# Patient Record
Sex: MALE | Race: WHITE | NOT HISPANIC OR LATINO | Employment: PART TIME | URBAN - METROPOLITAN AREA
[De-identification: names, ages, dates, MRNs, and addresses within clinical notes are randomized per-mention and may not be internally consistent; named-entity substitution may affect disease eponyms.]

---

## 2017-01-14 ENCOUNTER — HOSPITAL ENCOUNTER (EMERGENCY)
Facility: HOSPITAL | Age: 42
Discharge: HOME | End: 2017-01-14
Attending: FAMILY MEDICINE
Payer: COMMERCIAL

## 2017-01-14 VITALS
DIASTOLIC BLOOD PRESSURE: 97 MMHG | SYSTOLIC BLOOD PRESSURE: 150 MMHG | TEMPERATURE: 99 F | WEIGHT: 218.5 LBS | HEART RATE: 75 BPM | HEIGHT: 70 IN | RESPIRATION RATE: 18 BRPM | BODY MASS INDEX: 31.28 KG/M2 | OXYGEN SATURATION: 98 %

## 2017-01-14 DIAGNOSIS — D64.9 ANEMIA, UNSPECIFIED TYPE: ICD-10-CM

## 2017-01-14 DIAGNOSIS — R60.0 BILATERAL LOWER EXTREMITY EDEMA: Primary | ICD-10-CM

## 2017-01-14 LAB
BUN SERPL-MCNC: 29 MG/DL (ref 6–30)
CHLORIDE SERPL-SCNC: 111 MMOL/L (ref 95–110)
CREAT SERPL-MCNC: 1.8 MG/DL (ref 0.5–1.4)
GLUCOSE SERPL-MCNC: 95 MG/DL (ref 70–110)
HCT VFR BLD CALC: 28 %PCV (ref 36–54)
POC IONIZED CALCIUM: 1.07 MMOL/L (ref 1.06–1.42)
POC TCO2 (MEASURED): 22 MMOL/L (ref 23–29)
POTASSIUM BLD-SCNC: 3.9 MMOL/L (ref 3.5–5.1)
SAMPLE: ABNORMAL
SODIUM BLD-SCNC: 141 MMOL/L (ref 136–145)

## 2017-01-14 PROCEDURE — 25000003 PHARM REV CODE 250: Performed by: PHYSICIAN ASSISTANT

## 2017-01-14 PROCEDURE — 99284 EMERGENCY DEPT VISIT MOD MDM: CPT | Mod: ,,, | Performed by: PHYSICIAN ASSISTANT

## 2017-01-14 PROCEDURE — 99283 EMERGENCY DEPT VISIT LOW MDM: CPT

## 2017-01-14 RX ORDER — HYDROCODONE BITARTRATE AND ACETAMINOPHEN 5; 325 MG/1; MG/1
1 TABLET ORAL
Status: COMPLETED | OUTPATIENT
Start: 2017-01-14 | End: 2017-01-14

## 2017-01-14 RX ADMIN — HYDROCODONE BITARTRATE AND ACETAMINOPHEN 1 TABLET: 5; 325 TABLET ORAL at 01:01

## 2017-01-14 NOTE — ED PROVIDER NOTES
Encounter Date: 1/14/2017       History     Chief Complaint   Patient presents with    Leg Swelling     bilateral leg swelling; increased over the past 2 days; reports CKD     Review of patient's allergies indicates:   Allergen Reactions    Nsaids (non-steroidal anti-inflammatory drug)      Hx of GI bleed     HPI Comments: 40 y/o male with history of GERD, HTN, Cellulitis, Osteomyelitis, CKD, Gout, present to the ER with chief complaint of bilateral lower extremity pain x 2 days.  The patient reports intermittent swelling of the lower legs x 1-1/2 months.  He reports constant swelling for 2 days and associated foot pain while walking.  The pain radiates up the back of his legs.  He denies rash, wound, or trauma to the legs. He denies fever, nausea, vomiting.  He took tramadol for his pain yesterday without improvement.  His pain is rated 9/10. He denies chest pain, shortness of breath, wheezing, cough, abdominal pain or swelling, difficulty urinating, or additional complaints at this time.      Past Medical History   Diagnosis Date    Arthritis 10/11/2013    Blood transfusion     Cellulitis     GERD (gastroesophageal reflux disease)     GI bleed     Gout attack     Hypertension     Neuropathy     Osteomyelitis     Renal disorder        Past Surgical History   Procedure Laterality Date    Cyst removal      Incision and drainage of wound       left hand third finger    Surery on left middle finger       OSTEOMYLITIS       Social History   Substance Use Topics    Smoking status: Never Smoker    Smokeless tobacco: Never Used    Alcohol use No     Review of Systems   Constitutional: Negative for chills and fever.   HENT: Negative for sore throat.    Respiratory: Negative for cough, chest tightness, shortness of breath and wheezing.    Cardiovascular: Negative for chest pain and palpitations.   Gastrointestinal: Negative for abdominal pain, nausea and vomiting.   Genitourinary: Negative for dysuria.    Musculoskeletal: Positive for joint swelling and myalgias. Negative for back pain.   Skin: Negative for color change, rash and wound.   Neurological: Negative for weakness.   Hematological: Does not bruise/bleed easily.   Psychiatric/Behavioral: Negative for confusion.       Physical Exam   Initial Vitals   BP Pulse Resp Temp SpO2   01/14/17 1054 01/14/17 1054 01/14/17 1054 01/14/17 1054 01/14/17 1054   157/77 88 16 98.7 °F (37.1 °C) 100 %     Physical Exam    Constitutional: He appears well-developed and well-nourished.   HENT:   Head: Atraumatic.   Mouth/Throat: Oropharynx is clear and moist.   Eyes: Conjunctivae and EOM are normal. Pupils are equal, round, and reactive to light.   Neck: Normal range of motion. Neck supple.   Cardiovascular: Normal rate and regular rhythm.   Pulmonary/Chest: Breath sounds normal. No respiratory distress. He has no wheezes. He has no rhonchi. He has no rales.   Abdominal: Soft. Bowel sounds are normal. He exhibits no distension. There is no tenderness. There is no rebound and no guarding.   Musculoskeletal:        Right knee: He exhibits normal range of motion and no swelling.        Left knee: He exhibits normal range of motion and no swelling.        Right lower leg: He exhibits edema ( involving distal 1/2 of lower leg.  ).        Left lower leg: He exhibits edema (involving distal 1/2 of lower leg.  ).        Right foot: There is swelling ( diffuse moderate foot edema.  no skin changes, wounds, or drainage. ).        Left foot: There is swelling (diffuse moderate foot edema.  no skin changes, wounds, or drainage.  ).   Neurological: He is alert and oriented to person, place, and time.   Skin: No rash noted.   Psychiatric: He has a normal mood and affect.         ED Course   Procedures  Labs Reviewed   ISTAT CHEM8                   APC / Resident Notes:   Patient presents for evaluation of bilateral lower leg swelling intermittent times 1-1/2 months, with worsening swelling  and pain over the last 2 days.  No history of trauma to suggest fracture.  Patient is neurovascularly intact on exam.    Patient's creatinine is 1.8, which is improved from his baseline.  He has anemia consistent with previous labs with no active bleeding or dizziness.  No significant electrolyte abnormalities on labs performed today.  Patient's bilateral lower extremity leg swelling and pain is most consistent with dependent edema.  He works at a busy [employer] and has been working more than usual over the last month. there is no erythema or skin changes of the lower legs and I do not suspect infectious process or gouty arthritis.   I have instructed that he elevate the extremities multiple times per day.  He is instructed on close follow up with his PCP within 1 week.  He is stable for discharge and will continue tylenol prn pain.  I have given return precautions.  I discussed the care of this patient with my supervising MD.                ED Course     Clinical Impression:   The primary encounter diagnosis was Bilateral lower extremity edema. A diagnosis of Anemia, unspecified type was also pertinent to this visit.          AILYN Vega  01/14/17 9370

## 2017-01-14 NOTE — ED TRIAGE NOTES
Onset two days ago swelling and pain in both feet. Denies SOB or chest pain . Pain in left leg below back of left knee. Lower extremities are tight and  Firm , skin shiny. Denies chest pain . Possibly a 18# weight gain.

## 2017-01-14 NOTE — ED NOTES
LOC: The patient is awake and alert; oriented x 3 and speaking appropriately.  APPEARANCE: Patient resting comfortably, patient is clean and well groomed  SKIN: warm and dry, normal skin turgor & moist mucus membranes, skin intact, no breakdown noted.  MUSCULOSKELETAL: Patient moving all extremities well, no obvious swelling or deformities noted  RESPIRATORY: Airway is open and patent, breath sounds clear throughout all lung fields; respirations are spontaneous, normal effort and rate  CARDIAC: Patient has a normal rate, lower extremity edema noted bilaterally, legs and feet swollen, skin shiny and tight. , capillary refill < 3 seconds; No complaints of chest pain   ABDOMEN: Soft and non tender to palpation, no distention noted. Bowel sounds present x 4

## 2017-01-14 NOTE — ED AVS SNAPSHOT
[facility]  [address]  [facility] [address]               Stu Mitchell   2017 12:31 PM   ED    Description:  Male : 1975   Department:  [facility]           Your Care was Coordinated By:     Provider Role From To    Wagner Carney MD Attending Provider 17 1232 --    AILYN Vega Physician Assistant 17 1231 --      Reason for Visit     Leg Swelling           Diagnoses this Visit        Comments    Bilateral lower extremity edema    -  Primary     Anemia, unspecified type           ED Disposition     None           To Do List           Follow-up Information     Follow up with [facility]. Call in 1 day.    Why:  To discuss ER visit and schedule follow up appointment within 1 week    Contact information:    [address] [facility] [address]  [phone]        [facility] On Call     [facility] On Call Nurse Care Line -  Assistance  Registered nurses in the [facility] On Call Center provide clinical advisement, health education, appointment booking, and other advisory services.  Call for this free service at [phone].             Medications           Message regarding Medications     Verify the changes and/or additions to your medication regime listed below are the same as discussed with your clinician today.  If any of these changes or additions are incorrect, please notify your healthcare provider.        These medications were administered today        Dose Freq    hydrocodone-acetaminophen 5-325mg per tablet 1 tablet 1 tablet ED 1 Time    Sig: Take 1 tablet by mouth ED 1 Time.    Class: Normal    Route: Oral    Cosign for Ordering: Required by Wagner Carney MD           Verify that the below list of medications is an accurate representation of the medications you are currently taking.  If none reported, the list may be blank. If incorrect, please contact your healthcare provider.  "Carry this list with you in case of emergency.           Current Medications     allopurinol (ZYLOPRIM) 300 MG tablet Take 300 mg by mouth 3 (three) times daily.     cyanocobalamin 1,000 mcg/mL injection Inject 1 mL (1,000 mcg total) into the skin as directed. Give injection 1000mcg subcutaneous daily x 6 days, then 1000mcg subcutaneous weekly x 1 month, and then 1000mcg subcutaneous monthly    ferrous sulfate 325 (65 FE) MG EC tablet Take 1 tablet (325 mg total) by mouth once daily.    folic acid (FOLVITE) 1 MG tablet Take 1 mg by mouth once daily.    gabapentin (NEURONTIN) 300 MG capsule Take 1 capsule (300 mg total) by mouth 3 (three) times daily.    metoprolol tartrate (LOPRESSOR) 50 MG tablet Take 1 tablet (50 mg total) by mouth 2 (two) times daily.    pantoprazole (PROTONIX) 40 MG tablet Take 40 mg by mouth once daily.             Clinical Reference Information           Your Vitals Were     BP Pulse Temp Resp Height Weight    150/97 75 98.7 °F (37.1 °C) (Oral) 18 5' 10" (1.778 m) 99.1 kg (218 lb 7.6 oz)    SpO2 BMI             98% 31.35 kg/m2         Allergies as of 1/14/2017        Reactions    Nsaids (Non-steroidal Anti-inflammatory Drug)     Hx of GI bleed      Immunizations Administered on Date of Encounter - 1/14/2017     None      ED Micro, Lab, POCT     Start Ordered       Status Ordering Provider    01/14/17 1353 01/14/17 1353  ISTAT PROCEDURE  Once      Final result     01/14/17 1307 01/14/17 1308  ISTAT CHEM8  Once      Acknowledged       ED Imaging Orders     None        Discharge Instructions         Return to the ER for any change or worsening of symptoms, including those listed below.      Leg Swelling in Both Legs    Swelling of the feet, ankles, and legs is called edema. It is caused by excess fluid that has collected in the tissues. Extra fluid in the body settles in the lowest part because of gravity. This is why the legs and feet are most affected.  Some of the causes for edema " include:  · Disease of the heart like congestive heart failure  · Standing or sitting for long periods of time  · Infection of the feet or legs  · Blood pooling in the veins of your legs (venous insufficiency)  · Dilated veins in your lower leg (varicose veins)  · Garters or other clothing that is tight on your legs. This will cause blood to pool in your legs because the clothing limits blood flow.  · Some medicines such as hormones like birth control pills, some blood pressure medicines like calcium channel blockers (amlodipine) and steroids, some antidepressants like MAO inhibitors and tricyclics  · Menstrual periods that cause you to retain fluids  · Many types of renal disease  · Liver failure or cirrhosis  · Pregnancy, some swelling is normal, but a sudden increase in leg swelling or weight gain can be a sign of a dangerous complication of pregnancy  · Poor nutrition  · Thyroid disease  Medical treatment will depend on what is causing the swelling in your legs. Your healthcare provider may prescribe water pills (diuretics) to get rid of the extra fluid.  Home care  Follow these guidelines when caring for yourself at home:  · Don't wear clothing like garters that is tight on your legs.  · Keep your legs up while lying or sitting.  · If infection, injury, or recent surgery is causing the swelling, stay off your legs as much as possible until symptoms get better.  · If your healthcare provider says that your leg swelling is caused by venous insufficiency or varicose veins, don't sit or  one place for long periods of time. Take breaks and walk about every few hours. Brisk walking is a good exercise. It helps circulate the blood that has collected in your leg. Talk with your provider about using support stockings to stop daytime leg swelling.  · If your provider says that heart disease is causing your leg swelling, follow a low-salt diet to stop extra fluid from staying in your body. You may also need  medicine.  Follow-up care  Follow up with your healthcare provider, or as advised.  When to seek medical advice  Call your healthcare provider right away if any of these occur:  · New shortness of breath or chest pain  · Shortness of breath or chest pain that gets worse  · Swelling in both legs or ankles that gets worse  · Swelling of the abdomen  · Redness, warmth, or swelling in one leg  · Fever of 100.4ºF (38ºC) or higher, or as directed by your healthcare provider  · Yellow color to your skin or eyes  · Rapid, unexplained weight gain  · Having to sleep upright or use an increased number of pillows  © 4810-6354 [employer]. [address] [place] [address], [place] [address]. All rights reserved. This information is not intended as a substitute for professional medical care. Always follow your healthcare professional's instructions.          [facility] Sign-Up     Activating your [facility] account is as easy as 1-2-3!     1) Visit [email or web address], select Sign Up Now, enter this activation code and your date of birth, then select Next.  [email or web address]  Expires: 1/18/2017  4:12 AM      2) Create a username and password to use when you visit [facility] in the future and select a security question in case you lose your password and select Next.    3) Enter your e-mail address and click Sign Up!    Additional Information  If you have questions, please e-mail [email or web address] or call [phone] to talk to our [facility] staff. Remember, [facility] is NOT to be used for urgent needs. For medical emergencies, dial 911.          [facility] complies with applicable Federal civil rights laws and does not discriminate on the basis of race, color, national origin, age, disability, or sex.        Language Assistance Services     ATTENTION: Language assistance services are available, free of charge. Please call [phone].      ATENCIÓN: Si caiola rustam, tiene a zhou disposición servicios  gratuitos de asistencia lingüística. Asher herman [phone].     ALLIE Ý: N?u b?n nói Ti?ng Vi?t, có các d?ch v? h? tr? ngôn ng? mi?n phí anneh cho b?n. G?i s? [phone].

## 2017-01-14 NOTE — DISCHARGE INSTRUCTIONS
Return to the ER for any change or worsening of symptoms, including those listed below.      Leg Swelling in Both Legs    Swelling of the feet, ankles, and legs is called edema. It is caused by excess fluid that has collected in the tissues. Extra fluid in the body settles in the lowest part because of gravity. This is why the legs and feet are most affected.  Some of the causes for edema include:  · Disease of the heart like congestive heart failure  · Standing or sitting for long periods of time  · Infection of the feet or legs  · Blood pooling in the veins of your legs (venous insufficiency)  · Dilated veins in your lower leg (varicose veins)  · Garters or other clothing that is tight on your legs. This will cause blood to pool in your legs because the clothing limits blood flow.  · Some medicines such as hormones like birth control pills, some blood pressure medicines like calcium channel blockers (amlodipine) and steroids, some antidepressants like MAO inhibitors and tricyclics  · Menstrual periods that cause you to retain fluids  · Many types of renal disease  · Liver failure or cirrhosis  · Pregnancy, some swelling is normal, but a sudden increase in leg swelling or weight gain can be a sign of a dangerous complication of pregnancy  · Poor nutrition  · Thyroid disease  Medical treatment will depend on what is causing the swelling in your legs. Your healthcare provider may prescribe water pills (diuretics) to get rid of the extra fluid.  Home care  Follow these guidelines when caring for yourself at home:  · Don't wear clothing like garters that is tight on your legs.  · Keep your legs up while lying or sitting.  · If infection, injury, or recent surgery is causing the swelling, stay off your legs as much as possible until symptoms get better.  · If your healthcare provider says that your leg swelling is caused by venous insufficiency or varicose veins, don't sit or  one place for long periods of  time. Take breaks and walk about every few hours. Brisk walking is a good exercise. It helps circulate the blood that has collected in your leg. Talk with your provider about using support stockings to stop daytime leg swelling.  · If your provider says that heart disease is causing your leg swelling, follow a low-salt diet to stop extra fluid from staying in your body. You may also need medicine.  Follow-up care  Follow up with your healthcare provider, or as advised.  When to seek medical advice  Call your healthcare provider right away if any of these occur:  · New shortness of breath or chest pain  · Shortness of breath or chest pain that gets worse  · Swelling in both legs or ankles that gets worse  · Swelling of the abdomen  · Redness, warmth, or swelling in one leg  · Fever of 100.4ºF (38ºC) or higher, or as directed by your healthcare provider  · Yellow color to your skin or eyes  · Rapid, unexplained weight gain  · Having to sleep upright or use an increased number of pillows  © 3497-3219 The "[employer]. [address] [place] [address], [place] [address]. All rights reserved. This information is not intended as a substitute for professional medical care. Always follow your healthcare professional's instructions.

## 2017-03-21 ENCOUNTER — HOSPITAL ENCOUNTER (INPATIENT)
Facility: HOSPITAL | Age: 42
LOS: 1 days | Discharge: HOME | DRG: 554 | End: 2017-03-22
Attending: FAMILY MEDICINE | Admitting: HOSPITALIST
Payer: MEDICAID

## 2017-03-21 DIAGNOSIS — M79.89 FINGER SWELLING: ICD-10-CM

## 2017-03-21 DIAGNOSIS — N17.9 ACUTE KIDNEY INJURY: ICD-10-CM

## 2017-03-21 DIAGNOSIS — M79.644 FINGER PAIN, RIGHT: Primary | ICD-10-CM

## 2017-03-21 DIAGNOSIS — M79.645 FINGER PAIN, LEFT: ICD-10-CM

## 2017-03-21 DIAGNOSIS — N18.30 CKD (CHRONIC KIDNEY DISEASE) STAGE 3, GFR 30-59 ML/MIN: ICD-10-CM

## 2017-03-21 LAB
ALBUMIN SERPL BCP-MCNC: 4 G/DL
ALP SERPL-CCNC: 159 U/L
ALT SERPL W/O P-5'-P-CCNC: 15 U/L
ANION GAP SERPL CALC-SCNC: 13 MMOL/L
ANISOCYTOSIS BLD QL SMEAR: SLIGHT
AST SERPL-CCNC: 28 U/L
BASOPHILS # BLD AUTO: 0.1 K/UL
BASOPHILS NFR BLD: 1.3 %
BILIRUB SERPL-MCNC: 0.3 MG/DL
BUN SERPL-MCNC: 29 MG/DL
CALCIUM SERPL-MCNC: 9.3 MG/DL
CHLORIDE SERPL-SCNC: 104 MMOL/L
CHLORIDE UR-SCNC: <20 MMOL/L
CO2 SERPL-SCNC: 19 MMOL/L
CREAT SERPL-MCNC: 4 MG/DL
CREAT UR-MCNC: 282 MG/DL
CRP SERPL-MCNC: 27.4 MG/L
DIFFERENTIAL METHOD: ABNORMAL
EOSINOPHIL # BLD AUTO: 0.4 K/UL
EOSINOPHIL NFR BLD: 5.1 %
ERYTHROCYTE [DISTWIDTH] IN BLOOD BY AUTOMATED COUNT: 16 %
ERYTHROCYTE [SEDIMENTATION RATE] IN BLOOD BY WESTERGREN METHOD: 35 MM/HR
EST. GFR  (AFRICAN AMERICAN): 19.9 ML/MIN/1.73 M^2
EST. GFR  (NON AFRICAN AMERICAN): 17.2 ML/MIN/1.73 M^2
GLUCOSE SERPL-MCNC: 101 MG/DL
HCT VFR BLD AUTO: 37 %
HGB BLD-MCNC: 11.2 G/DL
HYPOCHROMIA BLD QL SMEAR: ABNORMAL
LYMPHOCYTES # BLD AUTO: 1.7 K/UL
LYMPHOCYTES NFR BLD: 22.3 %
MCH RBC QN AUTO: 23 PG
MCHC RBC AUTO-ENTMCNC: 30.3 %
MCV RBC AUTO: 76 FL
MONOCYTES # BLD AUTO: 0.5 K/UL
MONOCYTES NFR BLD: 6.9 %
NEUTROPHILS # BLD AUTO: 4.9 K/UL
NEUTROPHILS NFR BLD: 64.4 %
OVALOCYTES BLD QL SMEAR: ABNORMAL
PLATELET # BLD AUTO: 183 K/UL
PLATELET BLD QL SMEAR: ABNORMAL
PMV BLD AUTO: ABNORMAL FL
POIKILOCYTOSIS BLD QL SMEAR: SLIGHT
POTASSIUM SERPL-SCNC: 4.4 MMOL/L
PROT SERPL-MCNC: 7.5 G/DL
RBC # BLD AUTO: 4.88 M/UL
SODIUM SERPL-SCNC: 136 MMOL/L
SODIUM UR-SCNC: <20 MMOL/L
URATE SERPL-MCNC: 12 MG/DL
WBC # BLD AUTO: 7.59 K/UL

## 2017-03-21 PROCEDURE — 84550 ASSAY OF BLOOD/URIC ACID: CPT

## 2017-03-21 PROCEDURE — 82436 ASSAY OF URINE CHLORIDE: CPT

## 2017-03-21 PROCEDURE — 25000003 PHARM REV CODE 250: Performed by: EMERGENCY MEDICINE

## 2017-03-21 PROCEDURE — 63600175 PHARM REV CODE 636 W HCPCS: Performed by: PHYSICIAN ASSISTANT

## 2017-03-21 PROCEDURE — 63600175 PHARM REV CODE 636 W HCPCS: Performed by: INTERNAL MEDICINE

## 2017-03-21 PROCEDURE — 85025 COMPLETE CBC W/AUTO DIFF WBC: CPT

## 2017-03-21 PROCEDURE — 87070 CULTURE OTHR SPECIMN AEROBIC: CPT

## 2017-03-21 PROCEDURE — 84156 ASSAY OF PROTEIN URINE: CPT

## 2017-03-21 PROCEDURE — 12000002 HC ACUTE/MED SURGE SEMI-PRIVATE ROOM

## 2017-03-21 PROCEDURE — 81003 URINALYSIS AUTO W/O SCOPE: CPT

## 2017-03-21 PROCEDURE — 99285 EMERGENCY DEPT VISIT HI MDM: CPT | Mod: ,,, | Performed by: HOSPITALIST

## 2017-03-21 PROCEDURE — 96365 THER/PROPH/DIAG IV INF INIT: CPT

## 2017-03-21 PROCEDURE — 82570 ASSAY OF URINE CREATININE: CPT

## 2017-03-21 PROCEDURE — 84300 ASSAY OF URINE SODIUM: CPT

## 2017-03-21 PROCEDURE — 85651 RBC SED RATE NONAUTOMATED: CPT

## 2017-03-21 PROCEDURE — 86140 C-REACTIVE PROTEIN: CPT

## 2017-03-21 PROCEDURE — 87040 BLOOD CULTURE FOR BACTERIA: CPT | Mod: 59

## 2017-03-21 PROCEDURE — 99285 EMERGENCY DEPT VISIT HI MDM: CPT | Mod: 25

## 2017-03-21 PROCEDURE — 80053 COMPREHEN METABOLIC PANEL: CPT

## 2017-03-21 PROCEDURE — 96375 TX/PRO/DX INJ NEW DRUG ADDON: CPT

## 2017-03-21 RX ORDER — IBUPROFEN 200 MG
24 TABLET ORAL
Status: DISCONTINUED | OUTPATIENT
Start: 2017-03-22 | End: 2017-03-22 | Stop reason: HOSPADM

## 2017-03-21 RX ORDER — METOPROLOL TARTRATE 50 MG/1
50 TABLET ORAL 2 TIMES DAILY
Status: DISCONTINUED | OUTPATIENT
Start: 2017-03-22 | End: 2017-03-22 | Stop reason: HOSPADM

## 2017-03-21 RX ORDER — SODIUM CHLORIDE 9 MG/ML
INJECTION, SOLUTION INTRAVENOUS CONTINUOUS
Status: DISCONTINUED | OUTPATIENT
Start: 2017-03-22 | End: 2017-03-22 | Stop reason: HOSPADM

## 2017-03-21 RX ORDER — HYDROCODONE BITARTRATE AND ACETAMINOPHEN 5; 325 MG/1; MG/1
1 TABLET ORAL
Status: COMPLETED | OUTPATIENT
Start: 2017-03-21 | End: 2017-03-21

## 2017-03-21 RX ORDER — DOXYCYCLINE HYCLATE 100 MG
100 TABLET ORAL EVERY 12 HOURS
Status: DISCONTINUED | OUTPATIENT
Start: 2017-03-22 | End: 2017-03-21

## 2017-03-21 RX ORDER — HEPARIN SODIUM 5000 [USP'U]/ML
5000 INJECTION, SOLUTION INTRAVENOUS; SUBCUTANEOUS EVERY 8 HOURS
Status: DISCONTINUED | OUTPATIENT
Start: 2017-03-22 | End: 2017-03-22 | Stop reason: HOSPADM

## 2017-03-21 RX ORDER — IBUPROFEN 200 MG
16 TABLET ORAL
Status: DISCONTINUED | OUTPATIENT
Start: 2017-03-22 | End: 2017-03-22 | Stop reason: HOSPADM

## 2017-03-21 RX ORDER — GLUCAGON 1 MG
1 KIT INJECTION
Status: DISCONTINUED | OUTPATIENT
Start: 2017-03-22 | End: 2017-03-22 | Stop reason: HOSPADM

## 2017-03-21 RX ORDER — ALLOPURINOL 100 MG/1
200 TABLET ORAL 2 TIMES DAILY
Status: DISCONTINUED | OUTPATIENT
Start: 2017-03-22 | End: 2017-03-22 | Stop reason: HOSPADM

## 2017-03-21 RX ORDER — ENOXAPARIN SODIUM 100 MG/ML
30 INJECTION SUBCUTANEOUS EVERY 24 HOURS
Status: DISCONTINUED | OUTPATIENT
Start: 2017-03-22 | End: 2017-03-22

## 2017-03-21 RX ORDER — ALLOPURINOL 100 MG/1
300 TABLET ORAL 3 TIMES DAILY
Status: DISCONTINUED | OUTPATIENT
Start: 2017-03-22 | End: 2017-03-21

## 2017-03-21 RX ORDER — CLINDAMYCIN PHOSPHATE 900 MG/50ML
900 INJECTION, SOLUTION INTRAVENOUS
Status: COMPLETED | OUTPATIENT
Start: 2017-03-21 | End: 2017-03-21

## 2017-03-21 RX ORDER — DOXYCYCLINE HYCLATE 100 MG
100 TABLET ORAL EVERY 12 HOURS
Status: DISCONTINUED | OUTPATIENT
Start: 2017-03-21 | End: 2017-03-21

## 2017-03-21 RX ORDER — ONDANSETRON 2 MG/ML
4 INJECTION INTRAMUSCULAR; INTRAVENOUS
Status: COMPLETED | OUTPATIENT
Start: 2017-03-21 | End: 2017-03-21

## 2017-03-21 RX ORDER — PANTOPRAZOLE SODIUM 40 MG/1
40 TABLET, DELAYED RELEASE ORAL DAILY
Status: DISCONTINUED | OUTPATIENT
Start: 2017-03-22 | End: 2017-03-22 | Stop reason: HOSPADM

## 2017-03-21 RX ORDER — PREDNISONE 20 MG/1
20 TABLET ORAL 2 TIMES DAILY
Status: DISCONTINUED | OUTPATIENT
Start: 2017-03-21 | End: 2017-03-22 | Stop reason: HOSPADM

## 2017-03-21 RX ADMIN — HYDROCODONE BITARTRATE AND ACETAMINOPHEN 1 TABLET: 5; 325 TABLET ORAL at 08:03

## 2017-03-21 RX ADMIN — SODIUM CHLORIDE 1000 ML: 0.9 INJECTION, SOLUTION INTRAVENOUS at 10:03

## 2017-03-21 RX ADMIN — ONDANSETRON 4 MG: 2 INJECTION INTRAMUSCULAR; INTRAVENOUS at 08:03

## 2017-03-21 RX ADMIN — PREDNISONE 20 MG: 20 TABLET ORAL at 11:03

## 2017-03-21 RX ADMIN — CLINDAMYCIN IN 5 PERCENT DEXTROSE 900 MG: 18 INJECTION, SOLUTION INTRAVENOUS at 10:03

## 2017-03-21 NOTE — ED TRIAGE NOTES
"Pain in left wrist and p[rojas Has swelling at end of  Left middle finger . States it has been draining  Cloudy fluid.  Had fgever of 101.3 earlier this am. Took tylenol at 10am today.  Rates pain as 9/10.Has a hx of "osteo and gout 3yrs ago in same finger"  "

## 2017-03-21 NOTE — ED NOTES
LOC: The patient is awake and alert; oriented x 3 and speaking appropriately.  APPEARANCE: Patient resting comfortably, patient is clean and well groomed  SKIN: warm and dry, normal skin turgor & moist mucus membranes, skin intact, no breakdown noted.swollen end of left middle finger w/ draiange   MUSCULOSKELETAL: Patient moving all extremities well, no obvious swelling or deformities noted, pain in left hand and wrist and palm.   RESPIRATORY: Airway is open and patent, breath sounds clear throughout all lung fields; respirations are spontaneous, normal effort and rate  CARDIAC: Patient has a normal rate, no peripheral edema noted, capillary refill < 3 seconds; No complaints of chest pain   ABDOMEN: Soft and non tender to palpation, no distention noted.

## 2017-03-21 NOTE — IP AVS SNAPSHOT
[facility]  [address]  [facility] [address]  Phone: [phone]           Patient Discharge Instructions     Our goal is to set you up for success. This packet includes information on your condition, medications, and your home care. It will help you to care for yourself so you don't get sicker and need to go back to the hospital.     Please ask your nurse if you have any questions.        There are many details to remember when preparing to leave the hospital. Here is what you will need to do:    1. Take your medicine. If you are prescribed medications, review your Medication List in the following pages. You may have new medications to  at the pharmacy and others that you'll need to stop taking. Review the instructions for how and when to take your medications. Talk with your doctor or nurses if you are unsure of what to do.     2. Go to your follow-up appointments. Specific follow-up information is listed in the following pages. Your may be contacted by a transition nurse or clinical provider about future appointments. Be sure we have all of the phone numbers to reach you, if needed. Please contact your provider's office if you are unable to make an appointment.     3. Watch for warning signs. Your doctor or nurse will give you detailed warning signs to watch for and when to call for assistance. These instructions may also include educational information about your condition. If you experience any of warning signs to your health, call your doctor.               [facility]  Unless otherwise directed by your provider, please contact [facility], our nurse care line that is available for 24/7 assistance.     [phone] (toll-free)    Registered nurses in the [facility] provide clinical advisement, health education, appointment booking, and other advisory services.                    ** Verify the list of medication(s) below is accurate and up  to date. Carry this with you in case of emergency. If your medications have changed, please notify your healthcare provider.             Medication List      START taking these medications        Additional Info                      predniSONE 20 MG tablet   Commonly known as:  DELTASONE   Quantity:  3 tablet   Refills:  0   Dose:  20 mg    Last time this was given:  20 mg on 3/22/2017  8:44 AM   Instructions:  Take 1 tablet (20 mg total) by mouth once daily.     Begin Date    AM    Noon    PM    Bedtime         CHANGE how you take these medications        Additional Info                      cyanocobalamin 1,000 mcg/mL injection   Quantity:  30 mL   Refills:  1   Dose:  1000 mcg   What changed:    - when to take this  - additional instructions    Instructions:  Inject 1 mL (1,000 mcg total) into the skin as directed. Give injection 1000mcg subcutaneous daily x 6 days, then 1000mcg subcutaneous weekly x 1 month, and then 1000mcg subcutaneous monthly     Begin Date    AM    Noon    PM    Bedtime       * gabapentin 300 MG capsule   Commonly known as:  NEURONTIN   Quantity:  30 capsule   Refills:  0   Dose:  300 mg   What changed:  Another medication with the same name was added. Make sure you understand how and when to take each.    Last time this was given:  300 mg on 3/22/2017  3:06 PM   Instructions:  Take 1 capsule (300 mg total) by mouth 3 (three) times daily.     Begin Date    AM    Noon    PM    Bedtime       * gabapentin 300 MG capsule   Commonly known as:  NEURONTIN   Quantity:  90 capsule   Refills:  0   Dose:  300 mg   What changed:  You were already taking a medication with the same name, and this prescription was added. Make sure you understand how and when to take each.    Last time this was given:  300 mg on 3/22/2017  3:06 PM   Instructions:  Take 1 capsule (300 mg total) by mouth 3 (three) times daily.     Begin Date    AM    Noon    PM    Bedtime       * Notice:  This list has 2 medication(s) that  are the same as other medications prescribed for you. Read the directions carefully, and ask your doctor or other care provider to review them with you.      CONTINUE taking these medications        Additional Info                      allopurinol 100 MG tablet   Commonly known as:  ZYLOPRIM   Refills:  0   Dose:  400 mg    Last time this was given:  200 mg on 3/22/2017  8:45 AM   Instructions:  Take 400 mg by mouth once daily.     Begin Date    AM    Noon    PM    Bedtime       ferrous sulfate 325 (65 FE) MG EC tablet   Refills:  0   Dose:  325 mg   Comments:  Over the counter    Instructions:  Take 1 tablet (325 mg total) by mouth once daily.     Begin Date    AM    Noon    PM    Bedtime       folic acid 1 MG tablet   Commonly known as:  FOLVITE   Refills:  0   Dose:  1 mg    Instructions:  Take 1 mg by mouth once daily.     Begin Date    AM    Noon    PM    Bedtime       hydrocodone-acetaminophen 5-325mg 5-325 mg per tablet   Commonly known as:  NORCO   Refills:  0   Dose:  1 tablet    Last time this was given:  1 tablet on 3/21/2017  8:26 PM   Instructions:  Take 1 tablet by mouth every 6 (six) hours as needed for Pain.     Begin Date    AM    Noon    PM    Bedtime       metoprolol tartrate 50 MG tablet   Commonly known as:  LOPRESSOR   Quantity:  30 tablet   Refills:  0   Dose:  50 mg    Last time this was given:  50 mg on 3/22/2017  8:45 AM   Instructions:  Take 1 tablet (50 mg total) by mouth 2 (two) times daily.     Begin Date    AM    Noon    PM    Bedtime       mupirocin 2 % ointment   Commonly known as:  BACTROBAN   Refills:  0    Instructions:  Apply topically 3 (three) times daily. To finger     Begin Date    AM    Noon    PM    Bedtime       pantoprazole 40 MG tablet   Commonly known as:  PROTONIX   Refills:  0   Dose:  40 mg    Last time this was given:  40 mg on 3/22/2017  8:44 AM   Instructions:  Take 40 mg by mouth once daily.     Begin Date    AM    Noon    PM    Bedtime            Where to Get  Your Medications      These medications were sent to [facility]/pharmacy #[id number] - [place] - [address]  [address], [place] [address]     Phone:  [phone]     gabapentin 300 MG capsule    predniSONE 20 MG tablet                  Please bring to all follow up appointments:    1. A copy of your discharge instructions.  2. All medicines you are currently taking in their original bottles.  3. Identification and insurance card.    Please arrive 15 minutes ahead of scheduled appointment time.    Please call 24 hours in advance if you must reschedule your appointment and/or time.        Follow-up Information     Follow up with Gricelda Mittal MD On 3/27/2017.    Specialty:  Internal Medicine    Why:  3:00pm    Contact information:    [address]  [address]  [place] [id number] [phone]          Discharge Instructions     Future Orders    Activity as tolerated     Call MD for:  difficulty breathing or increased cough     Call MD for:  increased confusion or weakness     Call MD for:  persistent dizziness, light-headedness, or visual disturbances     Call MD for:  persistent nausea and vomiting or diarrhea     Call MD for:  redness, tenderness, or signs of infection (pain, swelling, redness, odor or green/yellow discharge around incision site)     Call MD for:  severe persistent headache     Call MD for:  severe uncontrolled pain     Call MD for:  temperature >100.4     Call MD for:  worsening rash     Diet general     Questions:    Total calories:      Fat restriction, if any:      Protein restriction, if any:      Na restriction, if any:      Fluid restriction:      Additional restrictions:          Primary Diagnosis     Your primary diagnosis was:  Not on File      Admission Information     Date & Time Provider Department [facility]    3/21/2017  6:27 PM Kimi Prieto MD [facility] [id number]      Care Providers     Provider Role Specialty Primary office phone    Kimi Prieto  "MD Attending Provider Hospitalist [phone]    Kimi Prieto MD Team Attending  Hospitalist [phone]      Your Vitals Were     BP Pulse Temp Resp Height Weight    128/66 (BP Location: Left arm, Patient Position: Lying, BP Method: Automatic) 77 97.4 °F (36.3 °C) (Oral) 18 5' 10" (1.778 m) 90.8 kg (200 lb 3.2 oz)    SpO2 BMI             98% 28.73 kg/m2         Recent Lab Values        2/2/2012 8/13/2016                        7:55 AM  4:49 AM          A1C 5.7 5.8          Comment for A1C at  4:49 AM on 8/13/2016:  According to ADA guidelines, hemoglobin A1C <7.0% represents  optimal control in non-pregnant diabetic patients.  Different  metrics may apply to specific populations.   Standards of Medical Care in Diabetes - 2016.  For the purpose of screening for the presence of diabetes:  <5.7%     Consistent with the absence of diabetes  5.7-6.4%  Consistent with increasing risk for diabetes   (prediabetes)  >or=6.5%  Consistent with diabetes  Currently no consensus exists for use of hemoglobin A1C  for diagnosis of diabetes for children.        Pending Labs     Order Current Status    Aerobic culture In process    Blood culture #1 **CANNOT BE ORDERED STAT** Preliminary result    Blood culture #2 **CANNOT BE ORDERED STAT** Preliminary result      Allergies as of 3/22/2017        Reactions    Nsaids (Non-steroidal Anti-inflammatory Drug)     Hx of GI bleed      Advance Directives     An advance directive is a document which, in the event you are no longer able to make decisions for yourself, tells your healthcare team what kind of treatment you do or do not want to receive, or who you would like to make those decisions for you.  If you do not currently have an advance directive, [facility] encourages you to create one.  For more information call:  [phone]), [phone] ([phone]),  or log on to [email or web address].        Language Assistance Services     ATTENTION: Language " assistance services are available, free of charge. Please call [phone].      ATENCIÓN: Si habla rustam, tiene a zhou disposición servicios gratuitos de asistencia lingüística. Llame al [phone].     CHÚ Ý: N?u b?n nói Ti?ng Vi?t, có các d?ch v? h? tr? ngôn ng? mi?n phí dành cho b?n. G?i s? [phone].        Chronic Kindey Disease Education              [facility] complies with applicable Federal civil rights laws and does not discriminate on the basis of race, color, national origin, age, disability, or sex.

## 2017-03-22 VITALS
OXYGEN SATURATION: 98 % | WEIGHT: 200.19 LBS | SYSTOLIC BLOOD PRESSURE: 128 MMHG | HEART RATE: 77 BPM | RESPIRATION RATE: 18 BRPM | HEIGHT: 70 IN | BODY MASS INDEX: 28.66 KG/M2 | DIASTOLIC BLOOD PRESSURE: 66 MMHG | TEMPERATURE: 97 F

## 2017-03-22 LAB
ANION GAP SERPL CALC-SCNC: 10 MMOL/L
ANION GAP SERPL CALC-SCNC: 11 MMOL/L
ANISOCYTOSIS BLD QL SMEAR: SLIGHT
BASOPHILS # BLD AUTO: 0.03 K/UL
BASOPHILS NFR BLD: 0.5 %
BILIRUB UR QL STRIP: NEGATIVE
BUN SERPL-MCNC: 19 MG/DL
BUN SERPL-MCNC: 25 MG/DL
CALCIUM SERPL-MCNC: 8.6 MG/DL
CALCIUM SERPL-MCNC: 8.6 MG/DL
CHLORIDE SERPL-SCNC: 108 MMOL/L
CHLORIDE SERPL-SCNC: 109 MMOL/L
CLARITY UR REFRACT.AUTO: ABNORMAL
CO2 SERPL-SCNC: 18 MMOL/L
CO2 SERPL-SCNC: 20 MMOL/L
COLOR UR AUTO: YELLOW
CREAT SERPL-MCNC: 2.3 MG/DL
CREAT SERPL-MCNC: 2.9 MG/DL
DIFFERENTIAL METHOD: ABNORMAL
EOSINOPHIL # BLD AUTO: 0.1 K/UL
EOSINOPHIL NFR BLD: 1.3 %
ERYTHROCYTE [DISTWIDTH] IN BLOOD BY AUTOMATED COUNT: 15.7 %
EST. GFR  (AFRICAN AMERICAN): 29.3 ML/MIN/1.73 M^2
EST. GFR  (AFRICAN AMERICAN): 38.8 ML/MIN/1.73 M^2
EST. GFR  (NON AFRICAN AMERICAN): 25.3 ML/MIN/1.73 M^2
EST. GFR  (NON AFRICAN AMERICAN): 33.5 ML/MIN/1.73 M^2
FERRITIN SERPL-MCNC: 33 NG/ML
GLUCOSE SERPL-MCNC: 108 MG/DL
GLUCOSE SERPL-MCNC: 143 MG/DL
GLUCOSE UR QL STRIP: NEGATIVE
HCT VFR BLD AUTO: 35.7 %
HGB BLD-MCNC: 10.9 G/DL
HGB UR QL STRIP: NEGATIVE
HYPOCHROMIA BLD QL SMEAR: ABNORMAL
IRON SERPL-MCNC: 17 UG/DL
KETONES UR QL STRIP: NEGATIVE
LEUKOCYTE ESTERASE UR QL STRIP: NEGATIVE
LYMPHOCYTES # BLD AUTO: 0.8 K/UL
LYMPHOCYTES NFR BLD: 13.6 %
MCH RBC QN AUTO: 23 PG
MCHC RBC AUTO-ENTMCNC: 30.5 %
MCV RBC AUTO: 76 FL
MONOCYTES # BLD AUTO: 0.1 K/UL
MONOCYTES NFR BLD: 1.5 %
NEUTROPHILS # BLD AUTO: 5 K/UL
NEUTROPHILS NFR BLD: 82.9 %
NITRITE UR QL STRIP: NEGATIVE
OVALOCYTES BLD QL SMEAR: ABNORMAL
PH UR STRIP: 5 [PH] (ref 5–8)
PHOSPHATE SERPL-MCNC: 3 MG/DL
PLATELET # BLD AUTO: 152 K/UL
PMV BLD AUTO: ABNORMAL FL
POIKILOCYTOSIS BLD QL SMEAR: SLIGHT
POLYCHROMASIA BLD QL SMEAR: ABNORMAL
POTASSIUM SERPL-SCNC: 4.5 MMOL/L
POTASSIUM SERPL-SCNC: 4.7 MMOL/L
PROCALCITONIN SERPL IA-MCNC: <0.09 NG/ML
PROT UR QL STRIP: NEGATIVE
PROT UR-MCNC: 30 MG/DL
RBC # BLD AUTO: 4.73 M/UL
RETICS/RBC NFR AUTO: 0.8 %
SATURATED IRON: 5 %
SODIUM SERPL-SCNC: 138 MMOL/L
SODIUM SERPL-SCNC: 138 MMOL/L
SP GR UR STRIP: 1.02 (ref 1–1.03)
TOTAL IRON BINDING CAPACITY: 343 UG/DL
TRANSFERRIN SERPL-MCNC: 232 MG/DL
TRANSFERRIN SERPL-MCNC: 232 MG/DL
URN SPEC COLLECT METH UR: ABNORMAL
UROBILINOGEN UR STRIP-ACNC: NEGATIVE EU/DL
WBC # BLD AUTO: 6.01 K/UL

## 2017-03-22 PROCEDURE — 80048 BASIC METABOLIC PNL TOTAL CA: CPT

## 2017-03-22 PROCEDURE — 25000003 PHARM REV CODE 250: Performed by: INTERNAL MEDICINE

## 2017-03-22 PROCEDURE — 99223 1ST HOSP IP/OBS HIGH 75: CPT | Mod: ,,, | Performed by: HOSPITALIST

## 2017-03-22 PROCEDURE — 25000003 PHARM REV CODE 250: Performed by: STUDENT IN AN ORGANIZED HEALTH CARE EDUCATION/TRAINING PROGRAM

## 2017-03-22 PROCEDURE — 85045 AUTOMATED RETICULOCYTE COUNT: CPT

## 2017-03-22 PROCEDURE — 63600175 PHARM REV CODE 636 W HCPCS: Performed by: INTERNAL MEDICINE

## 2017-03-22 PROCEDURE — 84145 PROCALCITONIN (PCT): CPT

## 2017-03-22 PROCEDURE — 83540 ASSAY OF IRON: CPT

## 2017-03-22 PROCEDURE — 84100 ASSAY OF PHOSPHORUS: CPT

## 2017-03-22 PROCEDURE — 85025 COMPLETE CBC W/AUTO DIFF WBC: CPT

## 2017-03-22 PROCEDURE — 80048 BASIC METABOLIC PNL TOTAL CA: CPT | Mod: 91

## 2017-03-22 PROCEDURE — 36415 COLL VENOUS BLD VENIPUNCTURE: CPT

## 2017-03-22 PROCEDURE — 82728 ASSAY OF FERRITIN: CPT

## 2017-03-22 RX ORDER — MUPIROCIN 20 MG/G
OINTMENT TOPICAL 3 TIMES DAILY
COMMUNITY
End: 2017-08-05

## 2017-03-22 RX ORDER — HYDROCODONE BITARTRATE AND ACETAMINOPHEN 5; 325 MG/1; MG/1
1 TABLET ORAL EVERY 6 HOURS PRN
COMMUNITY
End: 2017-03-25

## 2017-03-22 RX ORDER — ACETAMINOPHEN 325 MG/1
325 TABLET ORAL EVERY 6 HOURS PRN
Status: DISCONTINUED | OUTPATIENT
Start: 2017-03-22 | End: 2017-03-22 | Stop reason: HOSPADM

## 2017-03-22 RX ORDER — ALLOPURINOL 100 MG/1
400 TABLET ORAL DAILY
COMMUNITY
End: 2018-02-02

## 2017-03-22 RX ORDER — GABAPENTIN 300 MG/1
300 CAPSULE ORAL 3 TIMES DAILY
Status: DISCONTINUED | OUTPATIENT
Start: 2017-03-22 | End: 2017-03-22 | Stop reason: HOSPADM

## 2017-03-22 RX ORDER — OXYCODONE AND ACETAMINOPHEN 5; 325 MG/1; MG/1
1 TABLET ORAL EVERY 4 HOURS PRN
Status: DISCONTINUED | OUTPATIENT
Start: 2017-03-22 | End: 2017-03-22

## 2017-03-22 RX ORDER — PREDNISONE 20 MG/1
20 TABLET ORAL DAILY
Qty: 3 TABLET | Refills: 0 | Status: SHIPPED | OUTPATIENT
Start: 2017-03-22 | End: 2017-03-25

## 2017-03-22 RX ORDER — GABAPENTIN 300 MG/1
300 CAPSULE ORAL 3 TIMES DAILY
Qty: 90 CAPSULE | Refills: 0 | Status: SHIPPED | OUTPATIENT
Start: 2017-03-22 | End: 2018-02-02

## 2017-03-22 RX ADMIN — GABAPENTIN 300 MG: 300 CAPSULE ORAL at 03:03

## 2017-03-22 RX ADMIN — PREDNISONE 20 MG: 20 TABLET ORAL at 08:03

## 2017-03-22 RX ADMIN — ACETAMINOPHEN 325 MG: 325 TABLET ORAL at 05:03

## 2017-03-22 RX ADMIN — PANTOPRAZOLE SODIUM 40 MG: 40 TABLET, DELAYED RELEASE ORAL at 08:03

## 2017-03-22 RX ADMIN — COLCHICINE 0.3 MG: 0.6 TABLET, FILM COATED ORAL at 05:03

## 2017-03-22 RX ADMIN — HEPARIN SODIUM 5000 UNITS: 5000 INJECTION, SOLUTION INTRAVENOUS; SUBCUTANEOUS at 03:03

## 2017-03-22 RX ADMIN — SODIUM CHLORIDE: 0.9 INJECTION, SOLUTION INTRAVENOUS at 02:03

## 2017-03-22 RX ADMIN — COLCHICINE 0.3 MG: 0.6 TABLET, FILM COATED ORAL at 02:03

## 2017-03-22 RX ADMIN — OXYCODONE HYDROCHLORIDE AND ACETAMINOPHEN 1 TABLET: 5; 325 TABLET ORAL at 02:03

## 2017-03-22 RX ADMIN — ALLOPURINOL 200 MG: 100 TABLET ORAL at 08:03

## 2017-03-22 RX ADMIN — HEPARIN SODIUM 5000 UNITS: 5000 INJECTION, SOLUTION INTRAVENOUS; SUBCUTANEOUS at 05:03

## 2017-03-22 RX ADMIN — OXYCODONE HYDROCHLORIDE AND ACETAMINOPHEN 1 TABLET: 5; 325 TABLET ORAL at 08:03

## 2017-03-22 RX ADMIN — METOPROLOL TARTRATE 50 MG: 50 TABLET ORAL at 08:03

## 2017-03-22 NOTE — PLAN OF CARE
6:26 PM. Oncall SW paged to set pts transportation up. JOANNA set up with service cab to be here within 5-10 minutes picking pt up at [facility] entrance by the flags. pts nurse aware.       STEFFANY Mayer, BRENT  [id number]

## 2017-03-22 NOTE — H&P
[facility] Hospital Medicine  History & Physical    Patient Name: Stu Mitchell  MRN: [id number]  Admission Date: 3/21/2017  Attending Physician: Mukund Dobbs MD   Primary Care Provider: [facility] Medicine Team: [facility] HOS[facility] MED 3 Jarvis Whitaker DO     Patient information was obtained from patient, past medical records and ER records.     Subjective:     Principal Problem:Acute idiopathic gout of hand    Chief Complaint:   Chief Complaint   Patient presents with    Finger Pain     L middle finger draining, denies injury        HPI: Tom Mitchell is a 41-year-old male with HTN, GERD and gout with multiple flares involving the feet, knees and hands. He presents on 3/21/17 to the ED with left middle finger swelling for the past three days. Started after he cut his finger and has had some associated drainage with white material. Complained of fevers and chills at home.  In the ED, patient was evaluated by orthopedics. Material produced from the finger were sent for crystals and culture. Given one dose of clindamycin in the ED.   Lab work did not show a leukocytosis, however it did reveal a significant decrease in renal function with a BUN/Cr of 29/4.0. Does have decreased renal function at baseline 1.6-2. Denies any chest pain, shortness of breath, leg swelling or n/v. Denies any recent NSAID use.   Patient states he drinks >6 glasses of Soda daily, denies eating shellfish, denies kidney stones, and ETOH use.        Past Medical History:   Diagnosis Date    Arthritis 10/11/2013    Blood transfusion     Cellulitis     GERD (gastroesophageal reflux disease)     GI bleed     Gout attack     Hypertension     Neuropathy     Osteomyelitis     Renal disorder        Past Surgical History:   Procedure Laterality Date    CYST REMOVAL      INCISION AND DRAINAGE OF WOUND      left hand third finger    SURERY ON LEFT MIDDLE FINGER      OSTEOMYLITIS        Review of patient's allergies indicates:   Allergen Reactions    Nsaids (non-steroidal anti-inflammatory drug)      Hx of GI bleed       No current facility-administered medications on file prior to encounter.      Current Outpatient Prescriptions on File Prior to Encounter   Medication Sig    allopurinol (ZYLOPRIM) 300 MG tablet Take 300 mg by mouth 3 (three) times daily.     ferrous sulfate 325 (65 FE) MG EC tablet Take 1 tablet (325 mg total) by mouth once daily.    folic acid (FOLVITE) 1 MG tablet Take 1 mg by mouth once daily.    gabapentin (NEURONTIN) 300 MG capsule Take 1 capsule (300 mg total) by mouth 3 (three) times daily.    metoprolol tartrate (LOPRESSOR) 50 MG tablet Take 1 tablet (50 mg total) by mouth 2 (two) times daily.    pantoprazole (PROTONIX) 40 MG tablet Take 40 mg by mouth once daily.      cyanocobalamin 1,000 mcg/mL injection Inject 1 mL (1,000 mcg total) into the skin as directed. Give injection 1000mcg subcutaneous daily x 6 days, then 1000mcg subcutaneous weekly x 1 month, and then 1000mcg subcutaneous monthly (Patient taking differently: Inject 1,000 mcg into the skin every 30 days. )     Family History     Problem Relation (Age of Onset)    Cancer Mother    Diabetes Mother    Heart disease Mother    Hypertension Mother    Kidney disease Mother    Stroke Mother        Social History Main Topics    Smoking status: Never Smoker    Smokeless tobacco: Never Used    Alcohol use No    Drug use: No    Sexual activity: Yes     Partners: Female     Birth control/ protection: Condom     Review of Systems   Constitutional: Positive for chills and fever. Negative for appetite change, diaphoresis, fatigue and unexpected weight change.   HENT: Negative for congestion, ear pain, sinus pressure and sore throat.    Eyes: Negative for visual disturbance.   Respiratory: Negative for cough and shortness of breath.    Cardiovascular: Negative for chest pain, palpitations and leg swelling.    Gastrointestinal: Negative for abdominal pain, diarrhea, nausea and vomiting.   Genitourinary: Negative for decreased urine volume and dysuria.   Musculoskeletal: Positive for arthralgias and joint swelling. Negative for myalgias.   Neurological: Negative for headaches.   Psychiatric/Behavioral: Negative for agitation and confusion.     Objective:     Vital Signs (Most Recent):  Temp: 97.8 °F (36.6 °C) (03/21/17 2133)  Pulse: 75 (03/21/17 2201)  Resp: 18 (03/21/17 2133)  BP: 132/76 (03/21/17 2201)  SpO2: 100 % (03/21/17 2201) Vital Signs (24h Range):  Temp:  [97.8 °F (36.6 °C)-98.8 °F (37.1 °C)] 97.8 °F (36.6 °C)  Pulse:  [75-91] 75  Resp:  [16-18] 18  SpO2:  [97 %-100 %] 100 %  BP: (109-136)/(74-85) 132/76     Weight: 93.9 kg (207 lb)  Body mass index is 29.7 kg/(m^2).    Physical Exam   Constitutional: He appears well-developed and well-nourished. No distress.   HENT:   Head: Normocephalic and atraumatic.   Eyes: Conjunctivae and EOM are normal. Pupils are equal, round, and reactive to light. No scleral icterus.   Neck: Normal range of motion. Neck supple. No JVD present.   Cardiovascular: Normal rate, regular rhythm and normal heart sounds.    Pulmonary/Chest: Effort normal and breath sounds normal. He has no wheezes. He has no rales.   Abdominal: Soft. Bowel sounds are normal. He exhibits no distension. There is no tenderness.   Musculoskeletal:        Left hand: He exhibits decreased range of motion, tenderness, deformity and swelling.   Neurological: He is alert.   Skin: He is not diaphoretic.        Significant Labs:   CBC:   Recent Labs  Lab 03/21/17 1918   WBC 7.59   HGB 11.2*   HCT 37.0*        CMP:   Recent Labs  Lab 03/21/17 1918      K 4.4      CO2 19*      BUN 29*   CREATININE 4.0*   CALCIUM 9.3   PROT 7.5   ALBUMIN 4.0   BILITOT 0.3   ALKPHOS 159*   AST 28   ALT 15   ANIONGAP 13   EGFRNONAA 17.2*     Urine Studies: No results for input(s): COLORU, APPEARANCEUA, PHUR,  SPECGRAV, PROTEINUA, GLUCUA, KETONESU, BILIRUBINUA, OCCULTUA, NITRITE, UROBILINOGEN, LEUKOCYTESUR, RBCUA, WBCUA, BACTERIA, SQUAMEPITHEL, HYALINECASTS in the last 48 hours.    Invalid input(s): AMANDA      Assessment/Plan:     * Acute idiopathic gout of hand  History most consistent with an acute gout flare of his left middle finger. No systemic inflammatory response noted. WBC wnl. Uric acid elevated at 12. Currently taking maximum dosage of allopurinol as an outpatient. Awaiting crystal and culture results. Given the significant RODERICK, allopurinol dosage has been reduced and unable to give NSIADs. Renally dose colchicine is 0.3 mg daily. Added prednisone 20 mg BID. PRN Percocet for pain.     Acute kidney injury  Significant elevated in creatine from prior lab work. Concern for an intra-renal process. Awaiting lab work and renal US. Will initially support with IV fluids.       CKD (chronic kidney disease) stage 3, GFR 30-59 ml/min  Avoid any nephrotoxic medications.       Hypertension  Continue metoprolol tartrate. 50 mg BID.       GERD (gastroesophageal reflux disease)  Continue pantoprazole 40 mg daily.       Microcytic anemia  Updated iron studies.       VTE Risk Mitigation         Ordered     heparin (porcine) injection 5,000 Units  Every 8 hours     Route:  Subcutaneous        03/21/17 2336     Medium Risk of VTE  Once      03/21/17 1520        Jarvis Whitaker DO  Department of Hospital Medicine   [facility]

## 2017-03-22 NOTE — PLAN OF CARE
CM explained to patient that [facility] is out of network with his insurance company. In network providers are [facility], [place], or [facility]. Patient states that he never rec'd any paper work from his insurance company explaining benefits but verbalizes understanding now. Hospital follow up scheduled with Dr Gricelda Seymour with [facility], 3/27/17 at 3:00pm. Dr Seymour will coordinate care for GI and Rheum follow ups.     03/22/17 1430   Discharge Assessment   Assessment Type Discharge Planning Assessment   Confirmed/corrected address and phone number on facesheet? Yes   Assessment information obtained from? Patient;Medical Record;Other  (Patient known to me)   Expected Length of Stay (days) 1   Communicated expected length of stay with patient/caregiver yes   Prior to hospitilization cognitive status: Alert/Oriented   Prior to hospitalization functional status: Independent   Current cognitive status: Alert/Oriented   Current Functional Status: Independent   Arrived From home or self-care   Lives With alone   Able to Return to Prior Arrangements yes   Is patient able to care for self after discharge? Yes   Who are your caregiver(s) and their phone number(s)? sisterYokasta, provides assistance as needed   Patient's perception of discharge disposition home or selfcare   Readmission Within The Last 30 Days no previous admission in last 30 days   Patient currently being followed by outpatient case management? No   Patient currently receives home health services? No   Does the patient currently use HME? No   Patient currently receives private duty nursing? No   Patient currently receives any other outside agency services? No   Equipment Currently Used at Home none   Do you have any problems affording any of your prescribed medications? No   Is the patient taking medications as prescribed? yes   Do you have any financial concerns preventing you from receiving the healthcare you need? No   Does  the patient have transportation to healthcare appointments? Yes   Transportation Available family or friend will provide;public transportation   On Dialysis? No   Does the patient receive services at the Coumadin Clinic? No   Are there any open cases? No   Discharge Plan A Home   Patient/Family In Agreement With Plan yes

## 2017-03-22 NOTE — ED NOTES
I informed patient this his insurance [employer] is not contracted with the [facility] for admission.  I asked the patient if we could attempt to transfer him to either [facility], [facility], or [facility] who do take HMO[employer] insurance.  Patient states that he wishes to stay here and be admitted.  I informed him that his stay may not be covered here by [employer]MO[employer].  He stated again that he wants to stay here for his treatment.

## 2017-03-22 NOTE — PROVIDER PROGRESS NOTES - EMERGENCY DEPT.
Encounter Date: 3/21/2017    ED Physician Progress Notes        Physician Note:   Time seen by provider: 7:10PM    This is a 42-year-old white male with past medical history of gout, cellulitis, and MRSA of his left third finger who presents to the ER with a complaint of worsening pain and swelling to left third finger.  Patient states he accidentally cut his finger a few days ago.  He says since then he has had worsening pain and swelling to the palmar aspect of the left third finger.  He says there was some drainage from the wound that was yellow and white and thick.  He reports fever of 101°F today.  He says he had an episode of nausea and vomiting as well.  On exam, there is swelling and erythema of the distal left third finger.  Will get labs and x-ray.    I initially evaluated this patient and ordered workup while in intake.  The patient will receive a full evaluation in an ED pod when space is available.  All results from tests ordered in intake will not be followed by the intake team, including myself. All results will be followed by the ED Pod team.

## 2017-03-22 NOTE — PROGRESS NOTES
Patient seen and examined at bedside. Slight improvement in pain. Afebrile. Started colchicine. Medicine working up possible kidney injury    AOx3 in NAD, unlabored breathing. Appears well  Gouty tophous to right long finger distal phalanx  Tender to palpation  No overlying erythema or concern for infection  No fusiform swelling, no tenderness on the flexor sheath    42 year old male with right long finger tophaceous gout    Continue colchicine. Needs close rheum follow up as outpatient. Patient not compliant unless he is in acute flare. No surgical intervention. NPO order cancelled.     Attg Note:  I agree with the above assessment and plan.    Bora Cruz MD

## 2017-03-22 NOTE — PHARMACY MED REC
"MedMined Medication Reconciliation  Template    Patient was admitted on 3/21/2017 for Acute idiopathic gout of hand.    Patient's prior to admission medication regimen was as follows:  Prescriptions Prior to Admission   Medication Sig Dispense Refill Last Dose    allopurinol (ZYLOPRIM) 100 MG tablet Take 400 mg by mouth once daily.       ferrous sulfate 325 (65 FE) MG EC tablet Take 1 tablet (325 mg total) by mouth once daily.   3/21/2017    folic acid (FOLVITE) 1 MG tablet Take 1 mg by mouth once daily.   3/21/2017    gabapentin (NEURONTIN) 300 MG capsule Take 1 capsule (300 mg total) by mouth 3 (three) times daily. 30 capsule 0 3/21/2017    hydrocodone-acetaminophen 5-325mg (NORCO) 5-325 mg per tablet Take 1 tablet by mouth every 6 (six) hours as needed for Pain.       metoprolol tartrate (LOPRESSOR) 50 MG tablet Take 1 tablet (50 mg total) by mouth 2 (two) times daily. 30 tablet 0 3/21/2017    mupirocin (BACTROBAN) 2 % ointment Apply topically 3 (three) times daily. To finger       pantoprazole (PROTONIX) 40 MG tablet Take 40 mg by mouth once daily.     3/21/2017    cyanocobalamin 1,000 mcg/mL injection Inject 1 mL (1,000 mcg total) into the skin as directed. Give injection 1000mcg subcutaneous daily x 6 days, then 1000mcg subcutaneous weekly x 1 month, and then 1000mcg subcutaneous monthly (Patient taking differently: Inject 1,000 mcg into the skin every 30 days. ) 30 mL 1 Past Month     Please add appropriate    SmartPhrase below:  Admission Medication Reconciliation - Pharmacy Consult Note    The home medication history was taken by Darlene Faye, pharmacy technician.  Based on information gathered and subsequent review by the clinical pharmacist, the items below may need attention.     You may go to "Admission" then "Reconcile Home Medications" tabs to review and/or act upon these items. Based on information gathered and subsequent review by the clinical pharmacist, the items below may need " attention.    Potentially problematic discrepancies with current MAR  o Patient IS taking the following which was not ordered upon admit  o Ferrous sulfate 325 mg QD  o Folic acid 1 mg QD  o Hydrocodone/APAP 5-325 mg BID prn   o Mupirocin 2 % ointment apply to finger TID    Please address this information as you see fit.  Feel free to contact us if you have any questions or require assistance.    Adriana Valiente, Pharm.D  EXT [id number]

## 2017-03-22 NOTE — ASSESSMENT & PLAN NOTE
Significant elevated in creatine from prior lab work. Concern for an intra-renal process. Awaiting lab work and renal US. Will initially support with IV fluids.

## 2017-03-22 NOTE — ASSESSMENT & PLAN NOTE
History most consistent with an acute gout flare of his left middle finger. No systemic inflammatory response noted. WBC wnl. Uric acid elevated at 12. Currently taking maximum dosage of allopurinol as an outpatient. Awaiting crystal and culture results. Given the significant RODERICK, allopurinol dosage has been reduced and unable to give NSIADs. Renally dose colchicine is 0.3 mg daily. Added prednisone 20 mg BID. PRN Percocet for pain.

## 2017-03-22 NOTE — ED PROVIDER NOTES
"Encounter Date: 3/21/2017    SCRIBE #1 NOTE: I, Valerie Rudolph, am scribing for, and in the presence of,  Dr. Dobbs. I have scribed the following portions of the note - Other sections scribed: HPI, ROS.       History     Chief Complaint   Patient presents with    Finger Pain     L middle finger draining, denies injury     Review of patient's allergies indicates:   Allergen Reactions    Nsaids (non-steroidal anti-inflammatory drug)      Hx of GI bleed     HPI Comments: Time seen by provider: 8:18 PM    This is a 42 y.o. male who presents with complaint of left, third digit pain for two days. Pt states that the pain in middle finger started first after sustaining a small cut on metal a few days ago. He noted swelling yesterday, and yellow/white drainage this morning. Pt describes his finger "feels tight" when he tries to bend it. A fever of 101.3 measured at home this morning. Pt took Tylenol and fever decreased to 99 at 10 AM. Some associated chills last night and two episodes of emesis today. No hx of DM or recent Et OH/drug use. Pt reports hx of gout and osteomyelitis of the left third digit that resulted in surgery. No recent abx use. No other complaints or symptoms at this time.     The history is provided by the patient and medical records.     Past Medical History:   Diagnosis Date    Arthritis 10/11/2013    Blood transfusion     Cellulitis     GERD (gastroesophageal reflux disease)     GI bleed     Gout attack     Hypertension     Neuropathy     Osteomyelitis     Renal disorder      Past Surgical History:   Procedure Laterality Date    CYST REMOVAL      INCISION AND DRAINAGE OF WOUND      left hand third finger    SURERY ON LEFT MIDDLE FINGER      OSTEOMYLITIS     Family History   Problem Relation Age of Onset    Heart disease Mother     Diabetes Mother     Kidney disease Mother     Hypertension Mother     Stroke Mother     Cancer Mother      Social History   Substance Use Topics    " Smoking status: Never Smoker    Smokeless tobacco: Never Used    Alcohol use No     Review of Systems   Constitutional: Positive for chills (last night) and fever (this morning at 101.3 and then 99 after tylenol).   HENT: Negative for congestion and sore throat.    Eyes: Negative for visual disturbance.   Respiratory: Negative for shortness of breath.    Cardiovascular: Negative for chest pain.   Gastrointestinal: Positive for vomiting (two episodes today).   Musculoskeletal:        (+) Left, third digit pain   Skin: Positive for wound (small cut to left, third digit).   Neurological: Negative for syncope, weakness and numbness.   Psychiatric/Behavioral: Negative for confusion.       Physical Exam   Initial Vitals   BP Pulse Resp Temp SpO2   03/21/17 1641 03/21/17 1641 03/21/17 1641 03/21/17 1641 03/21/17 1641   136/85 91 16 98.8 °F (37.1 °C) 97 %     Physical Exam    Constitutional: He appears well-developed and well-nourished. He is not diaphoretic. No distress.   HENT:   Head: Normocephalic and atraumatic.   Right Ear: External ear normal.   Left Ear: External ear normal.   Mouth/Throat: Oropharynx is clear and moist.   Eyes: Conjunctivae are normal. Pupils are equal, round, and reactive to light. Right eye exhibits no discharge. Left eye exhibits no discharge. No scleral icterus.   Neck: Normal range of motion. Neck supple. No JVD present.   Cardiovascular: Normal rate, regular rhythm and intact distal pulses. Exam reveals no gallop.    Pulmonary/Chest: Breath sounds normal. No stridor. No respiratory distress. He has no wheezes. He has no rales.   Abdominal: Soft. Bowel sounds are normal. He exhibits no distension. There is no tenderness. There is no rebound.   Musculoskeletal: He exhibits edema and tenderness.   L middle finger DIP:  Erythematous, nodular appearance, slight fluctuance to palmar aspect, small abraded area radial aspect, ROM limited at DIP.  Faint streaking along flexor tendon to MCP but has  good ROM of PIP and MCP.  No crepitus, no drainage.  Sensation intact.  Rest of BUE w/ from and nl appearance.     Neurological: He is alert and oriented to person, place, and time. He has normal strength. No sensory deficit.   Skin: Skin is warm and dry. No rash noted. There is erythema.   Psychiatric: He has a normal mood and affect.         ED Course   Procedures  Labs Reviewed   CBC W/ AUTO DIFFERENTIAL - Abnormal; Notable for the following:        Result Value    Hemoglobin 11.2 (*)     Hematocrit 37.0 (*)     MCV 76 (*)     MCH 23.0 (*)     MCHC 30.3 (*)     RDW 16.0 (*)     All other components within normal limits   COMPREHENSIVE METABOLIC PANEL - Abnormal; Notable for the following:     CO2 19 (*)     BUN, Bld 29 (*)     Creatinine 4.0 (*)     Alkaline Phosphatase 159 (*)     eGFR if  19.9 (*)     eGFR if non  17.2 (*)     All other components within normal limits   URIC ACID - Abnormal; Notable for the following:     Uric Acid 12.0 (*)     All other components within normal limits   C-REACTIVE PROTEIN - Abnormal; Notable for the following:     CRP 27.4 (*)     All other components within normal limits   SEDIMENTATION RATE, MANUAL - Abnormal; Notable for the following:     Sed Rate 35 (*)     All other components within normal limits   CULTURE, BLOOD   CULTURE, BLOOD   CULTURE, AEROBIC  (SPECIFY SOURCE)   PROCALCITONIN   SODIUM, URINE, RANDOM   CREATININE, URINE, RANDOM   PROTEIN, QUANTITATIVE, URINE RANDOM   CHLORIDE, URINE, RANDOM          X-Rays:   Independently Interpreted Readings:   Other Readings:  L hand:  Bony changes distal 3rd phalanx, no fb, no dislocation    Medical Decision Making:   History:   Old Medical Records: I decided to obtain old medical records.  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  Pt w/ complicated hx, both gout and osteo.  Fever is concerning for infectious etiology.      Labs ordered from intake have  resulted - nl wbc, creat has doubled, k+ normal and CRP elevated.  Ortho consulted.    Ortho has seen pt, expressed material they think may be tophaceous, sent to lab for further eval.  rec empiric abx pending studies.  Will admit to medicine for mgt of the RODERICK (renal u/s and urine lytes ordered), abx pending cultures.  Case d/w Dr Jiménez for admit to [facility].   Clinical Tests:   Lab Tests: Ordered and Reviewed  Radiological Study: Ordered and Reviewed  Other:   I have discussed this case with another health care provider.            Scribe Attestation:   Scribe #1: I performed the above scribed service and the documentation accurately describes the services I performed. I attest to the accuracy of the note.    Attending Attestation:           Physician Attestation for Scribe:  Physician Attestation Statement for Scribe #1: I, Dr. Dobbs, reviewed documentation, as scribed by Valerie Rudolph in my presence, and it is both accurate and complete.                 ED Course     Clinical Impression:   The primary encounter diagnosis was Finger pain, right. Diagnoses of Finger pain, left, Finger swelling, Acute kidney injury, and CKD (chronic kidney disease) stage 3, GFR 30-59 ml/min were also pertinent to this visit.          Mukund Dobbs MD  03/21/17 2300

## 2017-03-22 NOTE — CONSULTS
Consult Note  Orthopaedics    SUBJECTIVE:     History of Present Illness:  Patient is a 42 y.o. male  presents with left long finger swelling and pain x 3 days.  Pt reports that he cut his finger and it has been draining some thick white material.  Pt has history of gout on allopurinol but with Uric acid elevated to 12 today.  Reports fever at home, afebrile in ER.  Denies any other pains or injuries.      Scheduled Meds:   clindamycin 900 MG/50 ML D5W  900 mg Intravenous ED 1 Time    sodium chloride 0.9%  1,000 mL Intravenous ED 1 Time     Continuous Infusions:   PRN Meds:    Review of patient's allergies indicates:   Allergen Reactions    Nsaids (non-steroidal anti-inflammatory drug)      Hx of GI bleed       Past Medical History:   Diagnosis Date    Arthritis 10/11/2013    Blood transfusion     Cellulitis     GERD (gastroesophageal reflux disease)     GI bleed     Gout attack     Hypertension     Neuropathy     Osteomyelitis     Renal disorder      Past Surgical History:   Procedure Laterality Date    CYST REMOVAL      INCISION AND DRAINAGE OF WOUND      left hand third finger    SURERY ON LEFT MIDDLE FINGER      OSTEOMYLITIS     Family History   Problem Relation Age of Onset    Heart disease Mother     Diabetes Mother     Kidney disease Mother     Hypertension Mother     Stroke Mother     Cancer Mother      Social History   Substance Use Topics    Smoking status: Never Smoker    Smokeless tobacco: Never Used    Alcohol use No        Review of Systems:  Patient denies constitutional symptoms, cardiac symptoms, respiratory symptoms, GI symptoms.  The remainder of the musculoskeletal ROS is included in the HPI.      OBJECTIVE:     Vital Signs (Most Recent)  Temp: 97.8 °F (36.6 °C) (03/21/17 2133)  Pulse: 76 (03/21/17 2133)  Resp: 18 (03/21/17 2133)  BP: 109/74 (03/21/17 2133)  SpO2: 100 % (03/21/17 2133)    Physical Exam:  Gen:  No acute distress  CV:  Peripherally well-perfused.  Pulses 2+  bilaterally.  Lungs:  Normal respiratory effort.  Abdomen:  Soft, non-tender, non-distended  Head/Neck:  Normocephalic.  Atraumatic. No TTP, AROM and PROM intact without pain  Neuro:  CN intact without deficit, SILT throughout B/L Upper & Lower Extremities  Pelvis: No TTP, Stable to direct anterior pressure over ASIS.    MSK:  LUE:  - small 5mm laceration to radial long finger; thick chalky white material expressed.  Swelling to DIP with multiple subcutaneous nodules.  NO tenderness proximally, able to range rest of finger  - AIN/PIN/Radial/Median/Ulnar Nerves assessed in isolation without deficit  - SILT throughout  - Radial & Ulnar arteries palpated 2+  - Capillary Refill <3s      Laboratory:  Recent Results (from the past 72 hour(s))   CBC auto differential    Collection Time: 03/21/17  7:18 PM   Result Value Ref Range    WBC 7.59 3.90 - 12.70 K/uL    RBC 4.88 4.60 - 6.20 M/uL    Hemoglobin 11.2 (L) 14.0 - 18.0 g/dL    Hematocrit 37.0 (L) 40.0 - 54.0 %    MCV 76 (L) 82 - 98 fL    MCH 23.0 (L) 27.0 - 31.0 pg    MCHC 30.3 (L) 32.0 - 36.0 %    RDW 16.0 (H) 11.5 - 14.5 %    Platelets 183 150 - 350 K/uL    MPV SEE COMMENT 9.2 - 12.9 fL    Gran # 4.9 1.8 - 7.7 K/uL    Lymph # 1.7 1.0 - 4.8 K/uL    Mono # 0.5 0.3 - 1.0 K/uL    Eos # 0.4 0.0 - 0.5 K/uL    Baso # 0.10 0.00 - 0.20 K/uL    Gran% 64.4 38.0 - 73.0 %    Lymph% 22.3 18.0 - 48.0 %    Mono% 6.9 4.0 - 15.0 %    Eosinophil% 5.1 0.0 - 8.0 %    Basophil% 1.3 0.0 - 1.9 %    Platelet Estimate Appears normal     Aniso Slight     Poik Slight     Hypo Occasional     Ovalocytes Occasional     Differential Method Automated    Comprehensive metabolic panel    Collection Time: 03/21/17  7:18 PM   Result Value Ref Range    Sodium 136 136 - 145 mmol/L    Potassium 4.4 3.5 - 5.1 mmol/L    Chloride 104 95 - 110 mmol/L    CO2 19 (L) 23 - 29 mmol/L    Glucose 101 70 - 110 mg/dL    BUN, Bld 29 (H) 6 - 20 mg/dL    Creatinine 4.0 (H) 0.5 - 1.4 mg/dL    Calcium 9.3 8.7 - 10.5 mg/dL     Total Protein 7.5 6.0 - 8.4 g/dL    Albumin 4.0 3.5 - 5.2 g/dL    Total Bilirubin 0.3 0.1 - 1.0 mg/dL    Alkaline Phosphatase 159 (H) 55 - 135 U/L    AST 28 10 - 40 U/L    ALT 15 10 - 44 U/L    Anion Gap 13 8 - 16 mmol/L    eGFR if African American 19.9 (A) >60 mL/min/1.73 m^2    eGFR if non  17.2 (A) >60 mL/min/1.73 m^2   Uric acid    Collection Time: 03/21/17  7:18 PM   Result Value Ref Range    Uric Acid 12.0 (H) 3.4 - 7.0 mg/dL   C-reactive protein    Collection Time: 03/21/17  7:18 PM   Result Value Ref Range    CRP 27.4 (H) 0.0 - 8.2 mg/L       Diagnostic Results:  X-ray: ST swelling to left long finger DIP    ASSESSMENT/PLAN:     A/P: Stu Mitchell is a 42 y.o. with left ling finger DIP swelling with nodular appearance.  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  Material sent for crystals and culture. Dressing applied.  Recommend treatment for likely acute gout attack.  No WCC elevation or fevers, recommend holding off of abx and monitoring for improvement on gout therapy.  NPO after midnight as a precaution.  Strict elevation of hand.            Attg Note:  I agree with the above assessment and plan.    Bora Cruz MD

## 2017-03-22 NOTE — SUBJECTIVE & OBJECTIVE
Past Medical History:   Diagnosis Date    Arthritis 10/11/2013    Blood transfusion     Cellulitis     GERD (gastroesophageal reflux disease)     GI bleed     Gout attack     Hypertension     Neuropathy     Osteomyelitis     Renal disorder        Past Surgical History:   Procedure Laterality Date    CYST REMOVAL      INCISION AND DRAINAGE OF WOUND      left hand third finger    SURERY ON LEFT MIDDLE FINGER      OSTEOMYLITIS       Review of patient's allergies indicates:   Allergen Reactions    Nsaids (non-steroidal anti-inflammatory drug)      Hx of GI bleed       No current facility-administered medications on file prior to encounter.      Current Outpatient Prescriptions on File Prior to Encounter   Medication Sig    allopurinol (ZYLOPRIM) 300 MG tablet Take 300 mg by mouth 3 (three) times daily.     ferrous sulfate 325 (65 FE) MG EC tablet Take 1 tablet (325 mg total) by mouth once daily.    folic acid (FOLVITE) 1 MG tablet Take 1 mg by mouth once daily.    gabapentin (NEURONTIN) 300 MG capsule Take 1 capsule (300 mg total) by mouth 3 (three) times daily.    metoprolol tartrate (LOPRESSOR) 50 MG tablet Take 1 tablet (50 mg total) by mouth 2 (two) times daily.    pantoprazole (PROTONIX) 40 MG tablet Take 40 mg by mouth once daily.      cyanocobalamin 1,000 mcg/mL injection Inject 1 mL (1,000 mcg total) into the skin as directed. Give injection 1000mcg subcutaneous daily x 6 days, then 1000mcg subcutaneous weekly x 1 month, and then 1000mcg subcutaneous monthly (Patient taking differently: Inject 1,000 mcg into the skin every 30 days. )     Family History     Problem Relation (Age of Onset)    Cancer Mother    Diabetes Mother    Heart disease Mother    Hypertension Mother    Kidney disease Mother    Stroke Mother        Social History Main Topics    Smoking status: Never Smoker    Smokeless tobacco: Never Used    Alcohol use No    Drug use: No    Sexual activity: Yes     Partners: Female      Birth control/ protection: Condom     Review of Systems   Constitutional: Positive for chills and fever. Negative for appetite change, diaphoresis, fatigue and unexpected weight change.   HENT: Negative for congestion, ear pain, sinus pressure and sore throat.    Eyes: Negative for visual disturbance.   Respiratory: Negative for cough and shortness of breath.    Cardiovascular: Negative for chest pain, palpitations and leg swelling.   Gastrointestinal: Negative for abdominal pain, diarrhea, nausea and vomiting.   Genitourinary: Negative for decreased urine volume and dysuria.   Musculoskeletal: Positive for arthralgias and joint swelling. Negative for myalgias.   Neurological: Negative for headaches.   Psychiatric/Behavioral: Negative for agitation and confusion.     Objective:     Vital Signs (Most Recent):  Temp: 97.8 °F (36.6 °C) (03/21/17 2133)  Pulse: 75 (03/21/17 2201)  Resp: 18 (03/21/17 2133)  BP: 132/76 (03/21/17 2201)  SpO2: 100 % (03/21/17 2201) Vital Signs (24h Range):  Temp:  [97.8 °F (36.6 °C)-98.8 °F (37.1 °C)] 97.8 °F (36.6 °C)  Pulse:  [75-91] 75  Resp:  [16-18] 18  SpO2:  [97 %-100 %] 100 %  BP: (109-136)/(74-85) 132/76     Weight: 93.9 kg (207 lb)  Body mass index is 29.7 kg/(m^2).    Physical Exam   Constitutional: He appears well-developed and well-nourished. No distress.   HENT:   Head: Normocephalic and atraumatic.   Eyes: Conjunctivae and EOM are normal. Pupils are equal, round, and reactive to light. No scleral icterus.   Neck: Normal range of motion. Neck supple. No JVD present.   Cardiovascular: Normal rate, regular rhythm and normal heart sounds.    Pulmonary/Chest: Effort normal and breath sounds normal. He has no wheezes. He has no rales.   Abdominal: Soft. Bowel sounds are normal. He exhibits no distension. There is no tenderness.   Musculoskeletal:        Left hand: He exhibits decreased range of motion, tenderness, deformity and swelling.   Neurological: He is alert.   Skin: He  is not diaphoretic.        Significant Labs:   CBC:   Recent Labs  Lab 03/21/17 1918   WBC 7.59   HGB 11.2*   HCT 37.0*        CMP:   Recent Labs  Lab 03/21/17 1918      K 4.4      CO2 19*      BUN 29*   CREATININE 4.0*   CALCIUM 9.3   PROT 7.5   ALBUMIN 4.0   BILITOT 0.3   ALKPHOS 159*   AST 28   ALT 15   ANIONGAP 13   EGFRNONAA 17.2*     Urine Studies: No results for input(s): COLORU, APPEARANCEUA, PHUR, SPECGRAV, PROTEINUA, GLUCUA, KETONESU, BILIRUBINUA, OCCULTUA, NITRITE, UROBILINOGEN, LEUKOCYTESUR, RBCUA, WBCUA, BACTERIA, SQUAMEPITHEL, HYALINECASTS in the last 48 hours.    Invalid input(s): WRIGHTSUR

## 2017-03-22 NOTE — MEDICAL/APP STUDENT
[facility] Hospital Medicine  History & Physical    Patient Name: Stu Mitchell  MRN: [id number]  Admission Date: 3/21/2017  Attending Physician: LELAND ROSADO MD  Primary Care Provider: [facility] Medicine Team: [facility]OSP MED 3 Kenan Osborne     Patient information was obtained from patient and ER records.     Subjective:     Principal Problem:Acute idiopathic gout of hand    Chief Complaint:   Chief Complaint   Patient presents with    Finger Pain     L middle finger draining, denies injury        HPI: Stu Mitchell is a 42 year old male with history of acute gout of Right third digit and Left third digit presents with complaint of left, third digit pain for 2 days. Patient states that the pain first started after a small cut on metal a few days ago. Patient noted swelling 3/21 morning and yellow/white drainage before coming to ED. Patient describes decreased range of motion of L. Third digit with pain progressing up to wrist. Patient reports a fever of 101 measured at home on 3/21 which was relieved by 1 dose of tylenol. Patient notes associated chills 3/20 night and 2 episodes of emesis on 3/21. No other associated symptoms.        Past Medical History:   Diagnosis Date    Arthritis 10/11/2013    Blood transfusion     Cellulitis     GERD (gastroesophageal reflux disease)     GI bleed     Gout attack     Hypertension     Neuropathy     Osteomyelitis     Renal disorder        Past Surgical History:   Procedure Laterality Date    CYST REMOVAL      INCISION AND DRAINAGE OF WOUND      left hand third finger    SURERY ON LEFT MIDDLE FINGER      OSTEOMYLITIS       Review of patient's allergies indicates:   Allergen Reactions    Nsaids (non-steroidal anti-inflammatory drug)      Hx of GI bleed       No current facility-administered medications on file prior to encounter.      Current Outpatient Prescriptions on File Prior to Encounter   Medication  Sig    ferrous sulfate 325 (65 FE) MG EC tablet Take 1 tablet (325 mg total) by mouth once daily.    folic acid (FOLVITE) 1 MG tablet Take 1 mg by mouth once daily.    gabapentin (NEURONTIN) 300 MG capsule Take 1 capsule (300 mg total) by mouth 3 (three) times daily.    metoprolol tartrate (LOPRESSOR) 50 MG tablet Take 1 tablet (50 mg total) by mouth 2 (two) times daily.    pantoprazole (PROTONIX) 40 MG tablet Take 40 mg by mouth once daily.      [DISCONTINUED] allopurinol (ZYLOPRIM) 300 MG tablet Take 300 mg by mouth 3 (three) times daily.     cyanocobalamin 1,000 mcg/mL injection Inject 1 mL (1,000 mcg total) into the skin as directed. Give injection 1000mcg subcutaneous daily x 6 days, then 1000mcg subcutaneous weekly x 1 month, and then 1000mcg subcutaneous monthly (Patient taking differently: Inject 1,000 mcg into the skin every 30 days. )     Family History     Problem Relation (Age of Onset)    Cancer Mother    Diabetes Mother    Heart disease Mother    Hypertension Mother    Kidney disease Mother    Stroke Mother        Social History Main Topics    Smoking status: Never Smoker    Smokeless tobacco: Never Used    Alcohol use No    Drug use: No    Sexual activity: Yes     Partners: Female     Birth control/ protection: Condom     Review of Systems   Constitutional: Positive for chills. Negative for fever, appetite change, diaphoresis, fatigue and unexpected weight change.   HENT: Negative for congestion, ear pain, sinus pressure and sore throat.   Eyes: Negative for visual disturbance.   Respiratory: Negative for cough and shortness of breath.   Cardiovascular: Negative for chest pain, palpitations and leg swelling.   Gastrointestinal: Negative for abdominal pain, diarrhea, nausea and vomiting.   Genitourinary: Negative for decreased urine volume and dysuria.   Musculoskeletal: Positive for joint swelling at left Third digit at DIP .   Neurological: Negative for headaches.    Psychiatric/Behavioral: Negative for agitation and confusion.     Objective:     Vital Signs (Most Recent):  Temp: 97.4 °F (36.3 °C) (03/22/17 1514)  Pulse: 77 (03/22/17 1514)  Resp: 18 (03/22/17 1514)  BP: 128/66 (03/22/17 1514)  SpO2: 98 % (03/22/17 1514) Vital Signs (24h Range):  Temp:  [97 °F (36.1 °C)-100.1 °F (37.8 °C)] 97.4 °F (36.3 °C)  Pulse:  [70-82] 77  Resp:  [16-18] 18  SpO2:  [98 %-100 %] 98 %  BP: (109-141)/(65-77) 128/66     Weight: 90.8 kg (200 lb 3.2 oz)  Body mass index is 28.73 kg/(m^2).    Physical Exam   Constitutional: He is oriented to person, place, and time. He appears well-developed and well-nourished.   HENT:   Head: Normocephalic and atraumatic.   Eyes: EOM are normal. Pupils are equal, round, and reactive to light.   Neck: Normal range of motion. Carotid bruit is not present.   Cardiovascular: S1 normal, S2 normal and normal heart sounds.    Pulmonary/Chest: Effort normal and breath sounds normal.   Abdominal: Soft. Bowel sounds are normal.   Musculoskeletal:   Decreased ROM of L. 3rd digit. Nodular erythematous tophi at L. 3rd digit.   Neurological: He is alert and oriented to person, place, and time.   Skin: Skin is warm and dry.   Psychiatric: He has a normal mood and affect.       Significant Labs:   Bilirubin:   Recent Labs  Lab 03/21/17 1918   BILITOT 0.3     CBC:   Recent Labs  Lab 03/21/17 1918 03/22/17  0522   WBC 7.59 6.01   HGB 11.2* 10.9*   HCT 37.0* 35.7*    152     CMP:   Recent Labs  Lab 03/21/17 1918 03/22/17  0522 03/22/17  1327    138 138   K 4.4 4.7 4.5    108 109   CO2 19* 20* 18*    108 143*   BUN 29* 25* 19   CREATININE 4.0* 2.9* 2.3*   CALCIUM 9.3 8.6* 8.6*   PROT 7.5  --   --    ALBUMIN 4.0  --   --    BILITOT 0.3  --   --    ALKPHOS 159*  --   --    AST 28  --   --    ALT 15  --   --    ANIONGAP 13 10 11   EGFRNONAA 17.2* 25.3* 33.5*           Assessment/Plan:     Chronic Gout  -Most likely nodular tophi presentation of patient's  Chronic Gout  - continue allopurinol and colchicine     RODERICK on Chronic Kidney Disease  -most likely due urate nephropathy secondary to Chronic Gout disease   -commence 1L IV bolus , recheck creatinine     Microcytic Iron Deficient Anemia   -GI consult for further evaluation of potential recurrence of upper GI Bleed    Active Diagnoses:    Diagnosis Date Noted POA    PRINCIPAL PROBLEM:  Acute idiopathic gout of hand [M10.049] 04/05/2016 Yes    Acute kidney injury [N17.9] 11/02/2013 Yes    CKD (chronic kidney disease) stage 3, GFR 30-59 ml/min [N18.3] 11/02/2013 Yes    Microcytic anemia [D50.9] 10/31/2013 Yes    Hypertension [I10] 09/17/2013 Yes     Chronic    GERD (gastroesophageal reflux disease) [K21.9] 09/17/2013 Yes     Chronic      Problems Resolved During this Admission:    Diagnosis Date Noted Date Resolved POA     VTE Risk Mitigation         Ordered     heparin (porcine) injection 5,000 Units  Every 8 hours     Route:  Subcutaneous        03/21/17 6224     Medium Risk of VTE  Once      03/21/17 5150        Kenan Osborne  Department of Hospital Medicine   [facility]

## 2017-03-23 NOTE — DISCHARGE SUMMARY
DISCHARGE SUMMARY  Hospital Medicine    [place] [facility]    Patient Name: Stu Mitchell  YOB: 1975    Admit Date: 3/21/2017    Discharge Date: 03/23/2017    Discharge Attending Physician: Dr. Prieto    Resident on Service: Dr. Pichardo     Chief Complaint:  Gout Flair    Princilpal Diagnoses:  Active Hospital Problems    Diagnosis  POA    *Acute idiopathic gout of hand [M10.049]  Yes    Acute kidney injury [N17.9]  Yes    CKD (chronic kidney disease) stage 3, GFR 30-59 ml/min [N18.3]  Yes    Microcytic anemia [D50.9]  Yes    Hypertension [I10]  Yes     Chronic    GERD (gastroesophageal reflux disease) [K21.9]  Yes     Chronic      Resolved Hospital Problems    Diagnosis Date Resolved POA   No resolved problems to display.       Discharged Condition: Admit problems have stabilized    HOSPITAL COURSE:      Initial Presentation:    Tom Mitchell is a 41-year-old male with HTN, GERD and gout with multiple flares involving the feet, knees and hands. He presents on 3/21/17 to the ED with left middle finger swelling for the past three days. Started after he cut his finger and has had some associated drainage with white material. Complained of fevers and chills at home.  In the ED, patient was evaluated by orthopedics. Material produced from the finger were sent for crystals and culture. Given one dose of clindamycin in the ED.  Lab work did not show a leukocytosis, however it did reveal a significant decrease in renal function with a BUN/Cr of 29/4.0. Does have decreased renal function at baseline 1.6-2. Denies any chest pain, shortness of breath, leg swelling or n/v. Denies any recent NSAID use. Patient states he drinks >6 glasses of Soda daily, denies eating shellfish, denies kidney stones, and ETOH use.     Course of Principle Problem for Admission:    Pt admitted for acute gout flair of finger and RODERICK on CKD. Gout flair treated with colchicine, allopurinol and steroids. NSAIDs held  secondary to RODERICK. RODERICK likely combination of pre-renal and intrinsic (possibly crystal precipitation). RODERICK improved with fluids. Pt discharged with short course steroids and new prescription for Gabapentin.        Other Medical Problems Addressed in the Hospital:      Acute idiopathic gout of hand  History most consistent with an acute gout flare of his left middle finger. No systemic inflammatory response noted. WBC wnl. Uric acid elevated at 12. Currently taking maximum dosage of allopurinol as an outpatient. Awaiting crystal and culture results. Given the significant RODERICK, allopurinol dosage has been reduced and unable to give NSIADs. Renally dose colchicine is 0.3 mg daily. Added prednisone 20 mg BID. PRN Percocet for pain.      Acute kidney injury  Significant elevated in creatine from prior lab work. Concern for an intra-renal process. Awaiting lab work and renal US. Will initially support with IV fluids.         CKD (chronic kidney disease) stage 3, GFR 30-59 ml/min  Avoid any nephrotoxic medications.         Hypertension  Continue metoprolol tartrate. 50 mg BID.         GERD (gastroesophageal reflux disease)  Continue pantoprazole 40 mg daily.         Microcytic anemia  Updated iron studies.          Last CBC/BMP/HgbA1c (if applicable):  Recent Results (from the past 336 hour(s))   CBC with Automated Differential    Collection Time: 03/22/17  5:22 AM   Result Value Ref Range    WBC 6.01 3.90 - 12.70 K/uL    Hemoglobin 10.9 (L) 14.0 - 18.0 g/dL    Hematocrit 35.7 (L) 40.0 - 54.0 %    Platelets 152 150 - 350 K/uL   CBC auto differential    Collection Time: 03/21/17  7:18 PM   Result Value Ref Range    WBC 7.59 3.90 - 12.70 K/uL    Hemoglobin 11.2 (L) 14.0 - 18.0 g/dL    Hematocrit 37.0 (L) 40.0 - 54.0 %    Platelets 183 150 - 350 K/uL     Recent Results (from the past 336 hour(s))   Basic metabolic panel    Collection Time: 03/22/17  1:27 PM   Result Value Ref Range    Sodium 138 136 - 145 mmol/L    Potassium  4.5 3.5 - 5.1 mmol/L    Chloride 109 95 - 110 mmol/L    CO2 18 (L) 23 - 29 mmol/L    BUN, Bld 19 6 - 20 mg/dL    Creatinine 2.3 (H) 0.5 - 1.4 mg/dL    Calcium 8.6 (L) 8.7 - 10.5 mg/dL    Anion Gap 11 8 - 16 mmol/L   Basic Metabolic Panel (BMP)    Collection Time: 03/22/17  5:22 AM   Result Value Ref Range    Sodium 138 136 - 145 mmol/L    Potassium 4.7 3.5 - 5.1 mmol/L    Chloride 108 95 - 110 mmol/L    CO2 20 (L) 23 - 29 mmol/L    BUN, Bld 25 (H) 6 - 20 mg/dL    Creatinine 2.9 (H) 0.5 - 1.4 mg/dL    Calcium 8.6 (L) 8.7 - 10.5 mg/dL    Anion Gap 10 8 - 16 mmol/L     Lab Results   Component Value Date    HGBA1C 5.8 08/13/2016         Disposition:  Home       Future Scheduled Appointments:  No future appointments.    Follow-up Plans from This Hospitalization:      Discharge Medication List:       Stu Mitchell   Home Medication Instructions CLEM:[id number]    Printed on:03/23/17 0814   Medication Information                      allopurinol (ZYLOPRIM) 100 MG tablet  Take 400 mg by mouth once daily.             cyanocobalamin 1,000 mcg/mL injection  Inject 1 mL (1,000 mcg total) into the skin as directed. Give injection 1000mcg subcutaneous daily x 6 days, then 1000mcg subcutaneous weekly x 1 month, and then 1000mcg subcutaneous monthly             ferrous sulfate 325 (65 FE) MG EC tablet  Take 1 tablet (325 mg total) by mouth once daily.             folic acid (FOLVITE) 1 MG tablet  Take 1 mg by mouth once daily.             gabapentin (NEURONTIN) 300 MG capsule  Take 1 capsule (300 mg total) by mouth 3 (three) times daily.             gabapentin (NEURONTIN) 300 MG capsule  Take 1 capsule (300 mg total) by mouth 3 (three) times daily.             hydrocodone-acetaminophen 5-325mg (NORCO) 5-325 mg per tablet  Take 1 tablet by mouth every 6 (six) hours as needed for Pain.             metoprolol tartrate (LOPRESSOR) 50 MG tablet  Take 1 tablet (50 mg total) by mouth 2 (two) times daily.             mupirocin  (BACTROBAN) 2 % ointment  Apply topically 3 (three) times daily. To finger             pantoprazole (PROTONIX) 40 MG tablet  Take 40 mg by mouth once daily.               predniSONE (DELTASONE) 20 MG tablet  Take 1 tablet (20 mg total) by mouth once daily.                 Patient Instructions:    Discharge Procedure Orders  Diet general     Activity as tolerated     Call MD for:  temperature >100.4     Call MD for:  persistent nausea and vomiting or diarrhea     Call MD for:  severe uncontrolled pain     Call MD for:  redness, tenderness, or signs of infection (pain, swelling, redness, odor or green/yellow discharge around incision site)     Call MD for:  difficulty breathing or increased cough     Call MD for:  severe persistent headache     Call MD for:  worsening rash     Call MD for:  persistent dizziness, light-headedness, or visual disturbances     Call MD for:  increased confusion or weakness         At the time of discharge patient was told to take all medications as prescribed, to keep all followup appointments, and to call their primary care physician or return to the emergency room if they have any worsening or concerning symptoms.    Signing Physician:  Elier Forte MD

## 2017-03-25 ENCOUNTER — HOSPITAL ENCOUNTER (EMERGENCY)
Facility: OTHER | Age: 42
Discharge: HOME | End: 2017-03-25
Attending: EMERGENCY MEDICINE
Payer: MEDICAID

## 2017-03-25 VITALS
HEIGHT: 70 IN | SYSTOLIC BLOOD PRESSURE: 140 MMHG | TEMPERATURE: 98 F | RESPIRATION RATE: 18 BRPM | HEART RATE: 91 BPM | WEIGHT: 207 LBS | DIASTOLIC BLOOD PRESSURE: 91 MMHG | OXYGEN SATURATION: 95 % | BODY MASS INDEX: 29.63 KG/M2

## 2017-03-25 DIAGNOSIS — R11.2 NAUSEA AND VOMITING, INTRACTABILITY OF VOMITING NOT SPECIFIED, UNSPECIFIED VOMITING TYPE: Primary | ICD-10-CM

## 2017-03-25 LAB
ALBUMIN SERPL BCP-MCNC: 3.6 G/DL
ALP SERPL-CCNC: 126 U/L
ALT SERPL W/O P-5'-P-CCNC: 13 U/L
ANION GAP SERPL CALC-SCNC: 13 MMOL/L
AST SERPL-CCNC: 14 U/L
BACTERIA SPEC AEROBE CULT: NO GROWTH
BASOPHILS # BLD AUTO: 0.07 K/UL
BASOPHILS NFR BLD: 1.1 %
BILIRUB SERPL-MCNC: 0.2 MG/DL
BUN SERPL-MCNC: 14 MG/DL
CALCIUM SERPL-MCNC: 8.6 MG/DL
CHLORIDE SERPL-SCNC: 110 MMOL/L
CO2 SERPL-SCNC: 17 MMOL/L
CREAT SERPL-MCNC: 1.7 MG/DL
DIFFERENTIAL METHOD: ABNORMAL
EOSINOPHIL # BLD AUTO: 0.2 K/UL
EOSINOPHIL NFR BLD: 3.6 %
ERYTHROCYTE [DISTWIDTH] IN BLOOD BY AUTOMATED COUNT: 16.2 %
EST. GFR  (AFRICAN AMERICAN): 56 ML/MIN/1.73 M^2
EST. GFR  (NON AFRICAN AMERICAN): 48 ML/MIN/1.73 M^2
GLUCOSE SERPL-MCNC: 105 MG/DL
HCT VFR BLD AUTO: 33.2 %
HGB BLD-MCNC: 10.1 G/DL
LYMPHOCYTES # BLD AUTO: 1.7 K/UL
LYMPHOCYTES NFR BLD: 28.2 %
MCH RBC QN AUTO: 23.2 PG
MCHC RBC AUTO-ENTMCNC: 30.4 %
MCV RBC AUTO: 76 FL
MONOCYTES # BLD AUTO: 0.4 K/UL
MONOCYTES NFR BLD: 5.9 %
NEUTROPHILS # BLD AUTO: 3.7 K/UL
NEUTROPHILS NFR BLD: 61.2 %
PLATELET # BLD AUTO: 152 K/UL
PLATELET BLD QL SMEAR: ABNORMAL
PMV BLD AUTO: ABNORMAL FL
POTASSIUM SERPL-SCNC: 3.7 MMOL/L
PROT SERPL-MCNC: 6.5 G/DL
RBC # BLD AUTO: 4.36 M/UL
SODIUM SERPL-SCNC: 140 MMOL/L
WBC # BLD AUTO: 6.09 K/UL

## 2017-03-25 PROCEDURE — 96374 THER/PROPH/DIAG INJ IV PUSH: CPT

## 2017-03-25 PROCEDURE — 85025 COMPLETE CBC W/AUTO DIFF WBC: CPT

## 2017-03-25 PROCEDURE — 99283 EMERGENCY DEPT VISIT LOW MDM: CPT | Mod: 25

## 2017-03-25 PROCEDURE — 63600175 PHARM REV CODE 636 W HCPCS: Performed by: EMERGENCY MEDICINE

## 2017-03-25 PROCEDURE — 96361 HYDRATE IV INFUSION ADD-ON: CPT

## 2017-03-25 PROCEDURE — 25000003 PHARM REV CODE 250: Performed by: EMERGENCY MEDICINE

## 2017-03-25 PROCEDURE — 80053 COMPREHEN METABOLIC PANEL: CPT

## 2017-03-25 RX ORDER — SODIUM CHLORIDE 9 MG/ML
1000 INJECTION, SOLUTION INTRAVENOUS
Status: COMPLETED | OUTPATIENT
Start: 2017-03-25 | End: 2017-03-25

## 2017-03-25 RX ORDER — ONDANSETRON 4 MG/1
4 TABLET, ORALLY DISINTEGRATING ORAL EVERY 8 HOURS PRN
Qty: 18 TABLET | Refills: 0 | Status: SHIPPED | OUTPATIENT
Start: 2017-03-25 | End: 2017-08-05

## 2017-03-25 RX ORDER — ONDANSETRON 2 MG/ML
4 INJECTION INTRAMUSCULAR; INTRAVENOUS
Status: COMPLETED | OUTPATIENT
Start: 2017-03-25 | End: 2017-03-25

## 2017-03-25 RX ADMIN — ONDANSETRON 4 MG: 2 INJECTION INTRAMUSCULAR; INTRAVENOUS at 01:03

## 2017-03-25 RX ADMIN — SODIUM CHLORIDE 1000 ML: 0.9 INJECTION, SOLUTION INTRAVENOUS at 01:03

## 2017-03-25 NOTE — ED AVS SNAPSHOT
[facility]  [address] [address]               Stu Mitchell   3/25/2017  1:20 AM   ED    Description:  Male : 1975   Department:  [facility]           Your Care was Coordinated By:     Provider Role From To    Carlos Lau MD Attending Provider 17 0121 --      Reason for Visit     Dizziness           Diagnoses this Visit        Comments    Nausea and vomiting, intractability of vomiting not specified, unspecified vomiting type    -  Primary       ED Disposition     None           To Do List           Follow-up Information     Follow up with [facility]. Schedule an appointment as soon as possible for a visit in 2 days.    Contact information:    [address] [facility] [address] [phone]          Follow up with [facility].    Specialty:  Emergency Medicine    Why:  If symptoms worsen    Contact information:    [address] [address] [phone]       These Medications        Disp Refills Start End    ondansetron (ZOFRAN-ODT) 4 MG TbDL 18 tablet 0 3/25/2017     Take 1 tablet (4 mg total) by mouth every 8 (eight) hours as needed. - Oral    Pharmacy: [id number] - [address] #: [phone]         [facility]     [facility] Care [facility] -  Assistance  Registered nurses in the [facility] provide clinical advisement, health education, appointment booking, and other advisory services.  Call for this free service at [phone].             Medications           Message regarding Medications     Verify the changes and/or additions to your medication regime listed below are the same as discussed with your clinician today.  If any of these changes or additions are incorrect, please notify your healthcare provider.        START taking these NEW medications         Refills    ondansetron (ZOFRAN-ODT) 4 MG TbDL 0    Sig: Take 1 tablet (4 mg total) by mouth every 8 (eight) hours as needed.    Class: Print    Route: Oral      These medications were administered today        Dose Freq    0.9%  NaCl infusion 1,000 mL ED 1 Time    Sig: Inject 1,000 mLs into the vein ED 1 Time.    Class: Normal    Route: Intravenous    ondansetron injection 4 mg 4 mg ED 1 Time    Sig: Inject 4 mg into the vein ED 1 Time.    Class: Normal    Route: Intravenous      STOP taking these medications     folic acid (FOLVITE) 1 MG tablet Take 1 mg by mouth once daily.    hydrocodone-acetaminophen 5-325mg (NORCO) 5-325 mg per tablet Take 1 tablet by mouth every 6 (six) hours as needed for Pain.    predniSONE (DELTASONE) 20 MG tablet Take 1 tablet (20 mg total) by mouth once daily.    cyanocobalamin 1,000 mcg/mL injection Inject 1 mL (1,000 mcg total) into the skin as directed. Give injection 1000mcg subcutaneous daily x 6 days, then 1000mcg subcutaneous weekly x 1 month, and then 1000mcg subcutaneous monthly           Verify that the below list of medications is an accurate representation of the medications you are currently taking.  If none reported, the list may be blank. If incorrect, please contact your healthcare provider. Carry this list with you in case of emergency.           Current Medications     allopurinol (ZYLOPRIM) 100 MG tablet Take 400 mg by mouth once daily.    ferrous sulfate 325 (65 FE) MG EC tablet Take 1 tablet (325 mg total) by mouth once daily.    gabapentin (NEURONTIN) 300 MG capsule Take 1 capsule (300 mg total) by mouth 3 (three) times daily.    metoprolol tartrate (LOPRESSOR) 50 MG tablet Take 1 tablet (50 mg total) by mouth 2 (two) times daily.    mupirocin (BACTROBAN) 2 % ointment Apply topically 3 (three) times daily. To finger    pantoprazole (PROTONIX) 40 MG tablet Take 40 mg by mouth once daily.      ondansetron (ZOFRAN-ODT) 4 MG TbDL Take 1 tablet (4 mg total) by mouth every  "8 (eight) hours as needed.           Clinical Reference Information           Your Vitals Were     BP Pulse Temp Resp Height Weight    140/91 (BP Location: Right arm, Patient Position: Sitting) 91 97.5 °F (36.4 °C) (Oral) 18 5' 10" (1.778 m) 93.9 kg (207 lb)    SpO2 BMI             95% 29.7 kg/m2         Allergies as of 3/25/2017        Reactions    Nsaids (Non-steroidal Anti-inflammatory Drug)     Hx of GI bleed      Immunizations Administered on Date of Encounter - 3/25/2017     None      ED Micro, Lab, POCT     Start Ordered       Status Ordering Provider    03/25/17 0127 03/25/17 0126  CBC auto differential  STAT      Preliminary result     03/25/17 0127 03/25/17 0126  Comprehensive metabolic panel  STAT      Final result       ED Imaging Orders     None      Discharge References/Attachments     NAUSEA AND VOMITING, HOW TO CONTROL (ENGLISH)    ONDANSETRON ORAL DISSOLVING TABLET (ENGLISH)       [facility] complies with applicable Federal civil rights laws and does not discriminate on the basis of race, color, national origin, age, disability, or sex.        Language Assistance Services     ATTENTION: Language assistance services are available, free of charge. Please call [phone].      ATENCIÓN: Si habla español, tiene a zhou disposición servicios gratuitos de asistencia lingüística. Llame al [phone].     CHÚ Ý: N?u b?n nói Ti?ng Vi?t, có các d?ch v? h? tr? ngôn ng? mi?n phí dành cho b?n. G?i s? [phone].        "

## 2017-03-25 NOTE — ED PROVIDER NOTES
Encounter Date: 3/25/2017    SCRIBE #1 NOTE: I, Dyan Spear, am scribing for, and in the presence of,  Dr. Lau. I have scribed the entire note.       History     Chief Complaint   Patient presents with    Dizziness     with chills, vomiting, after burning finger 1 week ago.      Review of patient's allergies indicates:   Allergen Reactions    Nsaids (non-steroidal anti-inflammatory drug)      Hx of GI bleed     HPI Comments: Time seen by provider: 1:29 AM    This is a 42 y.o. male who presents with complaint of light headedness. He reports onset of symptoms was 1 day ago. The patient states he feels as though he is going to pass out. He notes associated subjective fever, chills and vomiting but denies any blood in vomit, diarrhea, urinary symptoms, cough, congestion or headache. He reports he was discharged from [facility] yesterday for a finger problem. The patient is uncertain if the finger problem was due to infection or Gout. He states with evaluation he was found to have poor kidney function. The patient was found to have elevated Creatinine of 4.    The history is provided by the patient.     Past Medical History:   Diagnosis Date    Arthritis 10/11/2013    Blood transfusion     Cellulitis     GERD (gastroesophageal reflux disease)     GI bleed     Gout attack     Hypertension     Neuropathy     Osteomyelitis     Renal disorder      Past Surgical History:   Procedure Laterality Date    CYST REMOVAL      INCISION AND DRAINAGE OF WOUND      left hand third finger    SURERY ON LEFT MIDDLE FINGER      OSTEOMYLITIS     Family History   Problem Relation Age of Onset    Heart disease Mother     Diabetes Mother     Kidney disease Mother     Hypertension Mother     Stroke Mother     Cancer Mother      Social History   Substance Use Topics    Smoking status: Never Smoker    Smokeless tobacco: Never Used    Alcohol use No     Review of Systems   Constitutional: Positive for chills and  fever (subjective).   HENT: Negative for congestion and sore throat.    Eyes: Negative for redness and visual disturbance.   Respiratory: Negative for cough and shortness of breath.    Cardiovascular: Negative for chest pain and palpitations.   Gastrointestinal: Positive for nausea and vomiting. Negative for abdominal pain and diarrhea.   Genitourinary: Negative for dysuria.   Musculoskeletal: Negative for back pain.   Skin: Negative for rash.   Neurological: Positive for light-headedness. Negative for weakness and headaches.   Psychiatric/Behavioral: Negative for confusion.       Physical Exam   Initial Vitals   BP Pulse Resp Temp SpO2   03/25/17 0116 03/25/17 0116 03/25/17 0116 03/25/17 0116 03/25/17 0116   140/91 91 18 97.5 °F (36.4 °C) 95 %     Physical Exam    Nursing note and vitals reviewed.  Constitutional: He appears well-developed and well-nourished. He is not diaphoretic. No distress.   HENT:   Head: Normocephalic and atraumatic.   Eyes: Conjunctivae and EOM are normal.   Neck: Normal range of motion. Neck supple.   Cardiovascular: Normal rate, regular rhythm and normal heart sounds. Exam reveals no gallop and no friction rub.    No murmur heard.  Pulmonary/Chest: Breath sounds normal. He has no wheezes. He has no rhonchi. He has no rales.   Musculoskeletal: Normal range of motion. He exhibits no edema or tenderness.   Lymphadenopathy:     He has no cervical adenopathy.   Neurological: He is alert and oriented to person, place, and time. He has normal strength.   Cranial nerves II through XII grossly intact.  5/5 motor strength all 4 extremities.  Sensation is normal.  Finger to nose normal.  Gait normal.  Speech and cognition is normal.  No focal neurologic deficit.   Skin: Skin is warm and dry. No rash noted.   Left 3rd finger is swollen at distal end, tender without erythema. Hard white palpable nodules noted          ED Course   Procedures  Labs Reviewed   CBC W/ AUTO DIFFERENTIAL   COMPREHENSIVE  METABOLIC PANEL             Medical Decision Making:   Clinical Tests:   Lab Tests: Reviewed and Ordered  ED Management:  Patient presents complaining of nausea vomiting dizziness and feeling unwell.  Recent discharge from [facility].  I've reviewed this visit.  I've also evaluated the patient many times in the past.  He was seen recently for concern of his finger, infection versus gout.  This is a frequent presentation.  Reviewing surgery's note from yesterday, they do not think infection was likely, likely gout.  He was started on steroids.  He reports that since the steroids he does feel nauseous dizzy and unwell.  He thinks he had chills today.  His vital signs are without any concerning findings.  Screening labwork demonstrates improving kidney function.  It was incidentally noted on his admission that he had poor kidney function unclear reason.  However he continues to trend of improving creatinine today.  No other concerning findings.  He feels much better here after fluid hydration Zofran.  I prescribed Zofran encouraged him to stay hydrated.    I did have an extensive talk regarding signs to return for and need for follow up. Patient expressed understanding and will monitor symptoms closely and follow-up as needed.    DANIEL Lau M.D.  03/25/2017  4:31 AM              Scribe Attestation:   Scribe #1: I performed the above scribed service and the documentation accurately describes the services I performed. I attest to the accuracy of the note.    Attending Attestation:           Physician Attestation for Scribe:  Physician Attestation Statement for Scribe #1: I, Dr. Lau, reviewed documentation, as scribed by Dyan Spear in my presence, and it is both accurate and complete.                 ED Course     Clinical Impression:     1. Nausea and vomiting, intractability of vomiting not specified, unspecified vomiting type              Carlos Lau MD  03/25/17 0438

## 2017-03-26 LAB
BACTERIA BLD CULT: NORMAL
BACTERIA BLD CULT: NORMAL

## 2017-05-19 ENCOUNTER — HOSPITAL ENCOUNTER (EMERGENCY)
Facility: HOSPITAL | Age: 42
Discharge: HOME | End: 2017-05-19
Attending: EMERGENCY MEDICINE
Payer: MEDICAID

## 2017-05-19 VITALS
HEIGHT: 70 IN | WEIGHT: 197 LBS | RESPIRATION RATE: 16 BRPM | HEART RATE: 105 BPM | DIASTOLIC BLOOD PRESSURE: 105 MMHG | TEMPERATURE: 98 F | SYSTOLIC BLOOD PRESSURE: 183 MMHG | OXYGEN SATURATION: 99 % | BODY MASS INDEX: 28.2 KG/M2

## 2017-05-19 DIAGNOSIS — M10.9 GOUT, UNSPECIFIED CAUSE, UNSPECIFIED CHRONICITY, UNSPECIFIED SITE: Primary | ICD-10-CM

## 2017-05-19 DIAGNOSIS — M79.672 LEFT FOOT PAIN: ICD-10-CM

## 2017-05-19 PROCEDURE — 99284 EMERGENCY DEPT VISIT MOD MDM: CPT | Mod: ,,, | Performed by: EMERGENCY MEDICINE

## 2017-05-19 PROCEDURE — 99283 EMERGENCY DEPT VISIT LOW MDM: CPT

## 2017-05-19 PROCEDURE — 25000003 PHARM REV CODE 250: Performed by: EMERGENCY MEDICINE

## 2017-05-19 RX ORDER — PREDNISONE 20 MG/1
40 TABLET ORAL DAILY
Qty: 10 TABLET | Refills: 0 | Status: SHIPPED | OUTPATIENT
Start: 2017-05-19 | End: 2017-05-24

## 2017-05-19 RX ORDER — OXYCODONE AND ACETAMINOPHEN 5; 325 MG/1; MG/1
1 TABLET ORAL
Status: COMPLETED | OUTPATIENT
Start: 2017-05-19 | End: 2017-05-19

## 2017-05-19 RX ORDER — COLCHICINE 0.6 MG/1
TABLET ORAL
Qty: 30 TABLET | Refills: 11 | Status: SHIPPED | OUTPATIENT
Start: 2017-05-19 | End: 2018-02-02

## 2017-05-19 RX ADMIN — OXYCODONE HYDROCHLORIDE AND ACETAMINOPHEN 1 TABLET: 5; 325 TABLET ORAL at 09:05

## 2017-05-19 NOTE — ED PROVIDER NOTES
Encounter Date: 5/19/2017    SCRIBE #1 NOTE: I, Christi De La Cruz, am scribing for, and in the presence of,  Dr. Eagle. I have scribed the following portions of the note - Other sections scribed: HPI, ROS, PE, Chart review.       History     Chief Complaint   Patient presents with    Foot pain     History of gout.      Review of patient's allergies indicates:   Allergen Reactions    Nsaids (non-steroidal anti-inflammatory drug)      Hx of GI bleed     HPI Comments: Time seen by provider: 9:39 AM    This is a 42 y.o. male with history of gout, cellulitis, HTN, GERD, and renal disorder who presents with complaint of acute left lateral foot pain with swelling x 2 days. Pt states this pain does not feel like previous episodes of gout but does feel like prior cellulitis. He notes he is able to to take colchicine due to his kidney function. Pt endorses chills. Pt denies fever.      The history is provided by the patient.     Past Medical History:   Diagnosis Date    Arthritis 10/11/2013    Blood transfusion     Cellulitis     GERD (gastroesophageal reflux disease)     GI bleed     Gout attack     Hypertension     Neuropathy     Osteomyelitis     Renal disorder      Past Surgical History:   Procedure Laterality Date    CYST REMOVAL      INCISION AND DRAINAGE OF WOUND      left hand third finger    SURERY ON LEFT MIDDLE FINGER      OSTEOMYLITIS     Family History   Problem Relation Age of Onset    Heart disease Mother     Diabetes Mother     Kidney disease Mother     Hypertension Mother     Stroke Mother     Cancer Mother      Social History   Substance Use Topics    Smoking status: Never Smoker    Smokeless tobacco: Never Used    Alcohol use No     Review of Systems   Constitutional: Positive for chills. Negative for fever.   Musculoskeletal:        Positive for left foot pain and swelling.        Physical Exam   Initial Vitals   BP Pulse Resp Temp SpO2   05/19/17 0858 05/19/17 0858 05/19/17 0858  05/19/17 0858 05/19/17 0858   183/105 105 16 98.3 °F (36.8 °C) 99 %     Physical Exam    Nursing note and vitals reviewed.  Constitutional: He appears well-developed and well-nourished. No distress.   Comfortable.    Cardiovascular: Normal rate, regular rhythm, normal heart sounds and intact distal pulses.   Pulmonary/Chest: Breath sounds normal. No respiratory distress. He has no wheezes. He has no rhonchi. He has no rales.   Musculoskeletal: Normal range of motion.   Left foot swelling and tenderness primarily to the dorsal and lateral foot. Most tender over the 5th MTP. No swelling to left leg.    Skin: Skin is dry. There is erythema (Minimal).   Mild warmth to left foot. No streaking to left leg.          ED Course   Procedures  Labs Reviewed - No data to display          Medical Decision Making:   History:   Old Medical Records: I decided to obtain old medical records.  Old Records Summarized: other records.       <> Summary of Records: Pt with history of severe gout and multiple [facility] admissions for gouty flare. Often suspected to be infectious but after extensive workups has been shown to have only gout and no infection. Previous evaluations for gout in his left foot and in his hands. Pt has needed to have reduced colchicine dose  In the past because of his kidney function. His last creatine clearance was 48.   Initial Assessment:   41 yo m, h/o CKD, HTN, gout with frequent flares as above, now here for left foot pain/swelling, atraumatic, at site of previous gout flares.  No fever, no systemic sx.  Exam seems very c/w gout.  Pt afebrile, no signs of cellulitis  Differential Diagnosis:   Gouty flare, less likely infectious process  ED Management:  -colchicine 0.3 mg daily (renally dosed_  -prednisone  -[facility] f/u            Scribe Attestation:   Scribe #1: I performed the above scribed service and the documentation accurately describes the services I performed. I attest to the accuracy of the  note.    Attending Attestation:           Physician Attestation for Scribe:  Physician Attestation Statement for Scribe #1: I, Dr. Eagle , reviewed documentation, as scribed by Christi De La Cruz in my presence, and it is both accurate and complete.                 ED Course     Clinical Impression:   The encounter diagnosis was Gout, unspecified cause, unspecified chronicity, unspecified site.    Disposition:   Disposition: Discharged  Condition: Stable       Kate Eagle MD  05/25/17 4546

## 2017-05-19 NOTE — ED AVS SNAPSHOT
[facility]  [address]  [facility] [place] [facility] [address]               Stu Mitchell   2017  9:28 AM   ED    Description:  Male : 1975   Department:  [facility]           Your Care was Coordinated By:     Provider Role From To    Kate Eagle MD Attending Provider 17 0937 --      Reason for Visit     Foot pain           Diagnoses this Visit        Comments    Gout, unspecified cause, unspecified chronicity, unspecified site    -  Primary       ED Disposition     None           To Do List           Follow-up Information     Schedule an appointment as soon as possible for a visit with [facility].    Contact information:    [address] [facility] [address]  [phone]         These Medications        Disp Refills Start End    predniSONE (DELTASONE) 20 MG tablet 10 tablet 0 2017    Take 2 tablets (40 mg total) by mouth once daily. - Oral    Pharmacy: [facility] #[id number] [place] [address] Ph #: [phone]       colchicine 0.6 mg tablet 30 tablet 11 2017     Take 0.3 mg daily (please cut pill in half)    Pharmacy: [facility] [address] [place] [address] Ph #: [phone]         [facility]     [facility] On Call Nurse Care Line -  Assistance  Unless otherwise directed by your provider, please contact [facility], our nurse care line that is available for  assistance.     Registered nurses in the [facility] provide: appointment scheduling, clinical advisement, health education, and other advisory services.  Call: [phone] (toll free)               Medications           Message regarding Medications     Verify the changes and/or additions to your medication regime listed below are the same as discussed with your clinician today.  If any of these changes or additions are incorrect, please  "notify your healthcare provider.        START taking these NEW medications        Refills    predniSONE (DELTASONE) 20 MG tablet 0    Sig: Take 2 tablets (40 mg total) by mouth once daily.    Class: Print    Route: Oral    colchicine 0.6 mg tablet 11    Sig: Take 0.3 mg daily (please cut pill in half)    Class: Print      These medications were administered today        Dose Freq    oxycodone-acetaminophen 5-325 mg per tablet 1 tablet 1 tablet ED 1 Time    Sig: Take 1 tablet by mouth ED 1 Time.    Class: Normal    Route: Oral           Verify that the below list of medications is an accurate representation of the medications you are currently taking.  If none reported, the list may be blank. If incorrect, please contact your healthcare provider. Carry this list with you in case of emergency.           Current Medications     allopurinol (ZYLOPRIM) 100 MG tablet Take 400 mg by mouth once daily.    ferrous sulfate 325 (65 FE) MG EC tablet Take 1 tablet (325 mg total) by mouth once daily.    ondansetron (ZOFRAN-ODT) 4 MG TbDL Take 1 tablet (4 mg total) by mouth every 8 (eight) hours as needed.    pantoprazole (PROTONIX) 40 MG tablet Take 40 mg by mouth once daily.      colchicine 0.6 mg tablet Take 0.3 mg daily (please cut pill in half)    gabapentin (NEURONTIN) 300 MG capsule Take 1 capsule (300 mg total) by mouth 3 (three) times daily.    mupirocin (BACTROBAN) 2 % ointment Apply topically 3 (three) times daily. To finger    oxycodone-acetaminophen 5-325 mg per tablet 1 tablet Take 1 tablet by mouth ED 1 Time.    predniSONE (DELTASONE) 20 MG tablet Take 2 tablets (40 mg total) by mouth once daily.           Clinical Reference Information           Your Vitals Were     BP Pulse Temp Resp Height Weight    183/105 105 98.3 °F (36.8 °C) (Oral) 16 5' 10" (1.778 m) 89.4 kg (197 lb)    SpO2 BMI             99% 28.27 kg/m2         Allergies as of 5/19/2017        Reactions    Nsaids (Non-steroidal Anti-inflammatory Drug)     " Hx of GI bleed      Immunizations Administered on Date of Encounter - 5/19/2017     None      ED Micro, Lab, POCT     None      ED Imaging Orders     None        Discharge Instructions         Treating Gout Attacks     Raising the joint above the level of your heart can help reduce gout symptoms.     Gout is a disease that affects the joints. It is caused by excess uric acid in your blood stream that may lead to crystals forming in your joints. Left untreated, it can lead to painful foot and joint deformities and even kidney problems. But, by treating gout early, you can relieve pain and help prevent future problems. Gout can usually be treated with medication and proper diet. In severe cases, surgery may be needed.  Gout attacks are painful and often happen more than once. Taking medications may reduce pain and prevent attacks in the future. There are also some things you can do at home to relieve symptoms.  Medications for gout  Your healthcare provider may prescribe a daily medication to reduce levels of uric acid. Reducing your uric acid levels may help prevent gout attacks. Allopurinol is one commonly used medication taken daily to reduce uric acid levels. Other medications can help relieve pain and swelling during an acute attack. Medicines such as NSAIDs (nonsteroidal anti-inflammatory medicines), steroids, and colchicine may be prescribed for intermittent use to relieve an acute gout attack. Be sure to take your medication as directed.  What you can do  Below are some things you can do at home to relieve gout symptoms. Your healthcare provider may have other tips.  · Rest the painful joint as much as you can.  · Raise the painful joint so it is at a level higher than your heart.  · Use ice for 10 minutes every 1-2 hours as possible.  How can I prevent gout?  With a little effort, you may be able to prevent gout attacks in the future. Here are some things you can do:  · Avoid foods high in purines  ¨ Certain  meats (red meat, processed meat, turkey)  ¨ Organ meats (kidney, liver, sweetbread)  ¨ Shellfish (lobster, crab, shrimp, scallop, mussel)  ¨ Certain fish (anchovy, sardine, herring, mackerel)  · Take any medications prescribed by your healthcare provider.  · Lose weight if you need to.  · Reduce high fructose corn syrup in meals and drinks.  · Reduce or eliminate consumption of alcohol, particularly beer, but also red wine and spirits.  · Control blood pressure, diabetes, and cholesterol.  · Drink plenty of water to help flush uric acid from your body.  Date Last Reviewed: 2/1/2016  © 4928-8561 [employer]. [address] [place] [address], [place] [address]. All rights reserved. This information is not intended as a substitute for professional medical care. Always follow your healthcare professional's instructions.          Gout    Gout is an inflammation of a joint due to a build-up of gout crystals in the joint fluid. This occurs when there is an excess of uric acid (a normal waste product) in the body. Uric acid builds up in the body when the kidneys are unable to filter enough of it from the blood. This may occur with age. It is also associated with kidney disease. Gout occurs more often in persons with obesity, diabetes, hypertension, or high levels of fats in the blood. It may be run in families. Gout tends to come and go. A flare up of gout is called an attack. Drinking alcohol or eating certain foods (such as shellfish or foods with additives such as high-fructose corn syrup) may increase uric acid levels in the blood and cause a gout attack.  During a gout attack, the affected joint may become a hot, red, swollen and painful. If you have had one attack of gout, you are likely to have another. An attack of gout can be treated with medicine. If these attacks become frequent, a daily medicine may be prescribed to help the kidneys remove uric acid from the body.  Home care  During a gout attack:  · Rest  painful joints. If gout affects the joints of your foot or leg, you may want to use crutches for the first few days to keep from bearing weight on the affected joint.  · When sitting or lying down, raise the painful joint to a level higher than your heart.  · Apply an ice pack (ice cubes in a plastic bag wrapped in a thin towel) over the injured area for 20 minutes every 1-2 hours the first day for pain relief. Continue this 3-4 times a day for swelling and pain.  · Avoid alcohol and foods listed below (see Preventing attacks) during a gout attack. Drink extra fluid to help flush the uric acid through your kidneys.  · If you were prescribed a medication to treat gout, take it as your healthcare provider has instructed. Don't skip doses.  · Take anti-inflammatory medicine as directed.   · If pain medicines have been prescribed, take them exactly as directed.    Preventing attacks  · Minimize or avoid alcohol use. Excess alcohol intake can cause a gout attack.  · Limit these foods and beverages:  ¨ Organ meats, such as kidneys and liver  ¨ Certain seafoods (anchovies, sardines, shrimp, scallops, herring, mackerel)  ¨ Wild game, meat extracts and meat gravies  ¨ Foods and beverages sweetened with high-fructose corn syrup, such as sodas  · Eat a healthy diet including low-fat and nonfat dairy, whole grains, and vegetables.  · If you are overweight, talk to your healthcare provider about a weight reduction plan. Avoid fasting or extreme low calorie diets (less than 900 calories per day). This will increase uric acid levels in the body.  · If you have diabetes or high blood pressure, work with your doctor to manage these conditions.  · Protect the joint from injury. Trauma can trigger a gout attack.  Follow-up care  Follow up with your healthcare provider or as advised.   When to seek medical advice  Call your healthcare provider if you have any of the following:  · Fever over 100.4°F (38.ºC) with worsening joint  pain  · Increasing redness around the joint  · Pain developing in another joint  · Repeated vomiting, abdominal pain, or blood in the vomit or stool (black or red color)  Date Last Reviewed: 5/14/2015  © 7844-4694 [employer]. [address] [place] [address], [place] [address]. All rights reserved. This information is not intended as a substitute for professional medical care. Always follow your healthcare professional's instructions.           [facility] complies with applicable Federal civil rights laws and does not discriminate on the basis of race, color, national origin, age, disability, or sex.        Language Assistance Services     ATTENTION: Language assistance services are available, free of charge. Please call [phone].      ATENCIÓN: Si caiola rustam, tiene a zhou disposición servicios gratuitos de asistencia lingüística. Llame al [phone].     CHÚ Ý: N?u b?n nói Ti?ng Vi?t, có các d?ch v? h? tr? ngôn ng? mi?n phí dành cho b?n. G?i s? [phone].

## 2017-05-19 NOTE — ED NOTES
LOC: The patient is awake, alert and aware of environment with an appropriate affect, the patient is oriented x 3 and speaking appropriately.  APPEARANCE: Patient resting comfortably and in no acute distress, patient is clean and well groomed, patient's clothing is properly fastened.  SKIN: The skin is warm and dry, color consistent with ethnicity, patient has normal skin turgor and moist mucus membranes, skin intact, no breakdown or bruising noted.  MUSCULOSKELETAL: Patient moving all extremities well, no obvious swelling or deformities noted. Left foot pain and left small toe pain.   RESPIRATORY: Airway is open and patent, respirations are spontaneous, patient has a normal effort and rate, no accessory muscle use noted.  CARDIAC: Patient has a normal rate and rhythm, no periphreal edema noted, capillary refill < 3 seconds.  ABDOMEN: Soft and non tender to palpation, no distention noted.  NEUROLOGIC: eyes open spontaneously, behavior appropriate to situation, follows commands, facial expression symmetrical, bilateral hand grasp equal and even, purposeful motor response noted, normal sensation in all extremities when touched with a finger.

## 2017-05-19 NOTE — ED TRIAGE NOTES
Patient c/o left foot pain and left small toe pain x 2 days ago.  Patient states he is npt sure if its gout or if its infected.  Patient denies injury.  Patient states he has been taking the allopurinol.

## 2017-05-19 NOTE — DISCHARGE INSTRUCTIONS
Treating Gout Attacks     Raising the joint above the level of your heart can help reduce gout symptoms.     Gout is a disease that affects the joints. It is caused by excess uric acid in your blood stream that may lead to crystals forming in your joints. Left untreated, it can lead to painful foot and joint deformities and even kidney problems. But, by treating gout early, you can relieve pain and help prevent future problems. Gout can usually be treated with medication and proper diet. In severe cases, surgery may be needed.  Gout attacks are painful and often happen more than once. Taking medications may reduce pain and prevent attacks in the future. There are also some things you can do at home to relieve symptoms.  Medications for gout  Your healthcare provider may prescribe a daily medication to reduce levels of uric acid. Reducing your uric acid levels may help prevent gout attacks. Allopurinol is one commonly used medication taken daily to reduce uric acid levels. Other medications can help relieve pain and swelling during an acute attack. Medicines such as NSAIDs (nonsteroidal anti-inflammatory medicines), steroids, and colchicine may be prescribed for intermittent use to relieve an acute gout attack. Be sure to take your medication as directed.  What you can do  Below are some things you can do at home to relieve gout symptoms. Your healthcare provider may have other tips.  · Rest the painful joint as much as you can.  · Raise the painful joint so it is at a level higher than your heart.  · Use ice for 10 minutes every 1-2 hours as possible.  How can I prevent gout?  With a little effort, you may be able to prevent gout attacks in the future. Here are some things you can do:  · Avoid foods high in purines  ¨ Certain meats (red meat, processed meat, turkey)  ¨ Organ meats (kidney, liver, sweetbread)  ¨ Shellfish (lobster, crab, shrimp, scallop, mussel)  ¨ Certain fish (anchovy, sardine, herring,  mackerel)  · Take any medications prescribed by your healthcare provider.  · Lose weight if you need to.  · Reduce high fructose corn syrup in meals and drinks.  · Reduce or eliminate consumption of alcohol, particularly beer, but also red wine and spirits.  · Control blood pressure, diabetes, and cholesterol.  · Drink plenty of water to help flush uric acid from your body.  Date Last Reviewed: 2/1/2016 © 2000-2016 [employer]. [address] [place] [address], [place] [address]. All rights reserved. This information is not intended as a substitute for professional medical care. Always follow your healthcare professional's instructions.          Gout    Gout is an inflammation of a joint due to a build-up of gout crystals in the joint fluid. This occurs when there is an excess of uric acid (a normal waste product) in the body. Uric acid builds up in the body when the kidneys are unable to filter enough of it from the blood. This may occur with age. It is also associated with kidney disease. Gout occurs more often in persons with obesity, diabetes, hypertension, or high levels of fats in the blood. It may be run in families. Gout tends to come and go. A flare up of gout is called an attack. Drinking alcohol or eating certain foods (such as shellfish or foods with additives such as high-fructose corn syrup) may increase uric acid levels in the blood and cause a gout attack.  During a gout attack, the affected joint may become a hot, red, swollen and painful. If you have had one attack of gout, you are likely to have another. An attack of gout can be treated with medicine. If these attacks become frequent, a daily medicine may be prescribed to help the kidneys remove uric acid from the body.  Home care  During a gout attack:  · Rest painful joints. If gout affects the joints of your foot or leg, you may want to use crutches for the first few days to keep from bearing weight on the affected joint.  · When sitting  or lying down, raise the painful joint to a level higher than your heart.  · Apply an ice pack (ice cubes in a plastic bag wrapped in a thin towel) over the injured area for 20 minutes every 1-2 hours the first day for pain relief. Continue this 3-4 times a day for swelling and pain.  · Avoid alcohol and foods listed below (see Preventing attacks) during a gout attack. Drink extra fluid to help flush the uric acid through your kidneys.  · If you were prescribed a medication to treat gout, take it as your healthcare provider has instructed. Don't skip doses.  · Take anti-inflammatory medicine as directed.   · If pain medicines have been prescribed, take them exactly as directed.    Preventing attacks  · Minimize or avoid alcohol use. Excess alcohol intake can cause a gout attack.  · Limit these foods and beverages:  ¨ Organ meats, such as kidneys and liver  ¨ Certain seafoods (anchovies, sardines, shrimp, scallops, herring, mackerel)  ¨ Wild game, meat extracts and meat gravies  ¨ Foods and beverages sweetened with high-fructose corn syrup, such as sodas  · Eat a healthy diet including low-fat and nonfat dairy, whole grains, and vegetables.  · If you are overweight, talk to your healthcare provider about a weight reduction plan. Avoid fasting or extreme low calorie diets (less than 900 calories per day). This will increase uric acid levels in the body.  · If you have diabetes or high blood pressure, work with your doctor to manage these conditions.  · Protect the joint from injury. Trauma can trigger a gout attack.  Follow-up care  Follow up with your healthcare provider or as advised.   When to seek medical advice  Call your healthcare provider if you have any of the following:  · Fever over 100.4°F (38.ºC) with worsening joint pain  · Increasing redness around the joint  · Pain developing in another joint  · Repeated vomiting, abdominal pain, or blood in the vomit or stool (black or red color)  Date Last Reviewed:  5/14/2015  © 2159-0197 [employer]. [address] [place] [address], [place] [address]. All rights reserved. This information is not intended as a substitute for professional medical care. Always follow your healthcare professional's instructions.

## 2017-06-21 ENCOUNTER — HOSPITAL ENCOUNTER (EMERGENCY)
Facility: HOSPITAL | Age: 42
Discharge: HOME | End: 2017-06-21
Attending: EMERGENCY MEDICINE
Payer: MEDICAID

## 2017-06-21 VITALS
OXYGEN SATURATION: 100 % | HEART RATE: 111 BPM | RESPIRATION RATE: 18 BRPM | WEIGHT: 200 LBS | SYSTOLIC BLOOD PRESSURE: 136 MMHG | HEIGHT: 70 IN | BODY MASS INDEX: 28.63 KG/M2 | TEMPERATURE: 98 F | DIASTOLIC BLOOD PRESSURE: 73 MMHG

## 2017-06-21 DIAGNOSIS — M1A.0720 CHRONIC GOUT OF LEFT FOOT, UNSPECIFIED CAUSE: Primary | ICD-10-CM

## 2017-06-21 DIAGNOSIS — M25.562 LEFT KNEE PAIN: ICD-10-CM

## 2017-06-21 DIAGNOSIS — M79.673 FOOT PAIN: ICD-10-CM

## 2017-06-21 DIAGNOSIS — M79.672 LEFT FOOT PAIN: ICD-10-CM

## 2017-06-21 DIAGNOSIS — R00.0 TACHYCARDIA: ICD-10-CM

## 2017-06-21 LAB
ALBUMIN SERPL BCP-MCNC: 3.2 G/DL
ALP SERPL-CCNC: 146 U/L
ALT SERPL W/O P-5'-P-CCNC: 16 U/L
ANION GAP SERPL CALC-SCNC: 11 MMOL/L
AST SERPL-CCNC: 21 U/L
BASOPHILS # BLD AUTO: 0.02 K/UL
BASOPHILS NFR BLD: 0.1 %
BILIRUB SERPL-MCNC: 0.4 MG/DL
BUN SERPL-MCNC: 22 MG/DL
CALCIUM SERPL-MCNC: 10.3 MG/DL
CHLORIDE SERPL-SCNC: 101 MMOL/L
CO2 SERPL-SCNC: 27 MMOL/L
CREAT SERPL-MCNC: 1.9 MG/DL
CRP SERPL-MCNC: 161.7 MG/L
DIFFERENTIAL METHOD: ABNORMAL
EOSINOPHIL # BLD AUTO: 0 K/UL
EOSINOPHIL NFR BLD: 0.1 %
ERYTHROCYTE [DISTWIDTH] IN BLOOD BY AUTOMATED COUNT: 15.9 %
ERYTHROCYTE [SEDIMENTATION RATE] IN BLOOD BY WESTERGREN METHOD: 107 MM/HR
EST. GFR  (AFRICAN AMERICAN): 48.8 ML/MIN/1.73 M^2
EST. GFR  (NON AFRICAN AMERICAN): 42.2 ML/MIN/1.73 M^2
GLUCOSE SERPL-MCNC: 105 MG/DL
HCT VFR BLD AUTO: 34.3 %
HGB BLD-MCNC: 10.4 G/DL
LYMPHOCYTES # BLD AUTO: 1.3 K/UL
LYMPHOCYTES NFR BLD: 9.6 %
MCH RBC QN AUTO: 23.2 PG
MCHC RBC AUTO-ENTMCNC: 30.3 %
MCV RBC AUTO: 76 FL
MONOCYTES # BLD AUTO: 0.9 K/UL
MONOCYTES NFR BLD: 7 %
NEUTROPHILS # BLD AUTO: 11.1 K/UL
NEUTROPHILS NFR BLD: 83.1 %
PLATELET # BLD AUTO: 337 K/UL
PMV BLD AUTO: 12.1 FL
POTASSIUM SERPL-SCNC: 4.4 MMOL/L
PROT SERPL-MCNC: 8.3 G/DL
RBC # BLD AUTO: 4.49 M/UL
SODIUM SERPL-SCNC: 139 MMOL/L
T4 FREE SERPL-MCNC: 1.16 NG/DL
TSH SERPL DL<=0.005 MIU/L-ACNC: 0.38 UIU/ML
URATE SERPL-MCNC: 9.3 MG/DL
WBC # BLD AUTO: 13.38 K/UL

## 2017-06-21 PROCEDURE — 25000003 PHARM REV CODE 250

## 2017-06-21 PROCEDURE — 80053 COMPREHEN METABOLIC PANEL: CPT

## 2017-06-21 PROCEDURE — 84439 ASSAY OF FREE THYROXINE: CPT

## 2017-06-21 PROCEDURE — 96360 HYDRATION IV INFUSION INIT: CPT

## 2017-06-21 PROCEDURE — 86140 C-REACTIVE PROTEIN: CPT

## 2017-06-21 PROCEDURE — 99284 EMERGENCY DEPT VISIT MOD MDM: CPT | Mod: ,,,

## 2017-06-21 PROCEDURE — 85651 RBC SED RATE NONAUTOMATED: CPT

## 2017-06-21 PROCEDURE — 84443 ASSAY THYROID STIM HORMONE: CPT

## 2017-06-21 PROCEDURE — 85025 COMPLETE CBC W/AUTO DIFF WBC: CPT

## 2017-06-21 PROCEDURE — 99284 EMERGENCY DEPT VISIT MOD MDM: CPT | Mod: 25

## 2017-06-21 PROCEDURE — 93005 ELECTROCARDIOGRAM TRACING: CPT

## 2017-06-21 PROCEDURE — 84550 ASSAY OF BLOOD/URIC ACID: CPT

## 2017-06-21 PROCEDURE — 93010 ELECTROCARDIOGRAM REPORT: CPT | Mod: ,,, | Performed by: INTERNAL MEDICINE

## 2017-06-21 RX ORDER — PREDNISONE 5 MG/1
5 TABLET ORAL DAILY
Qty: 105 TABLET | Refills: 0 | Status: SHIPPED | OUTPATIENT
Start: 2017-06-21 | End: 2017-07-01

## 2017-06-21 RX ORDER — HYDROCODONE BITARTRATE AND ACETAMINOPHEN 5; 325 MG/1; MG/1
1 TABLET ORAL EVERY 4 HOURS PRN
Qty: 12 TABLET | Refills: 0 | Status: SHIPPED | OUTPATIENT
Start: 2017-06-21 | End: 2017-08-05

## 2017-06-21 RX ORDER — TRAMADOL HYDROCHLORIDE 50 MG/1
100 TABLET ORAL
Status: COMPLETED | OUTPATIENT
Start: 2017-06-21 | End: 2017-06-21

## 2017-06-21 RX ORDER — METOPROLOL TARTRATE 50 MG/1
50 TABLET ORAL 2 TIMES DAILY
COMMUNITY

## 2017-06-21 RX ADMIN — SODIUM CHLORIDE 1000 ML: 0.9 INJECTION, SOLUTION INTRAVENOUS at 03:06

## 2017-06-21 RX ADMIN — TRAMADOL HYDROCHLORIDE 100 MG: 50 TABLET, COATED ORAL at 02:06

## 2017-06-21 NOTE — ED TRIAGE NOTES
"Pt c/o left foot redness, swelling, and pain onset approx x3 weeks. Pt reports pain to left knee onset last night, states "I had to have my knee drained not too long ago, and I think it might need to be drained again." Denies numbness or tingling. Denies injury. Denies any other complaints at this time.  "

## 2017-06-21 NOTE — ED NOTES
Pt stable for discharge. VS stable and c/o mild pain. Pt given Rx pain meds . Pt currently in no distress.

## 2017-06-21 NOTE — ED PROVIDER NOTES
"Encounter Date: 6/21/2017    SCRIBE #1 NOTE: I, Don Sheikh, am scribing for, and in the presence of,  Dr. Parker. I have scribed the following portions of the note - the APC attestation.       History     Chief Complaint   Patient presents with    Foot Problem     Pt reporting pain and redness to the left foot since Saturday.  Pt also reporting pain in the left knee since yesterday; no known injury.     42-year-old male with medical history of gout, hypertension, acid reflux presents to the ED with left foot and knee pain.  Patient seen 3 weeks ago for left foot pain and states that has not gotten better with colchicine or 3 Medrol Dosepak's.  Patient also endorses left knee pain that began yesterday.  Patient states "when it starts to feel like this I know it's about to walk up on me".  Patient denies any injury, eating meat, alcohol.  However carbonated drink was in room with patient.  Patient denies fever, chills, nausea, vomiting, chest pain, shortness of breath, abdominal pain, changes in urination, changes in bowel, weakness, syncope.          Review of patient's allergies indicates:   Allergen Reactions    Nsaids (non-steroidal anti-inflammatory drug)      Hx of GI bleed     Past Medical History:   Diagnosis Date    Arthritis 10/11/2013    Blood transfusion     Cellulitis     GERD (gastroesophageal reflux disease)     GI bleed     Gout attack     Hypertension     Neuropathy     Osteomyelitis     Renal disorder      Past Surgical History:   Procedure Laterality Date    CYST REMOVAL      INCISION AND DRAINAGE OF WOUND      left hand third finger    SURERY ON LEFT MIDDLE FINGER      OSTEOMYLITIS     Family History   Problem Relation Age of Onset    Heart disease Mother     Diabetes Mother     Kidney disease Mother     Hypertension Mother     Stroke Mother     Cancer Mother      Social History   Substance Use Topics    Smoking status: Never Smoker    Smokeless tobacco: Never Used    " Alcohol use No     Review of Systems   Constitutional: Negative for diaphoresis and fever.   HENT: Negative for nosebleeds.    Respiratory: Negative for cough and shortness of breath.    Cardiovascular: Negative for chest pain and leg swelling.   Gastrointestinal: Negative for abdominal distention, nausea and vomiting.   Genitourinary: Negative for dysuria and hematuria.   Musculoskeletal: Positive for arthralgias and joint swelling. Negative for neck pain.   Skin: Negative for rash.   Neurological: Negative for syncope, weakness and headaches.   Hematological: Does not bruise/bleed easily.   Psychiatric/Behavioral: The patient is not nervous/anxious.        Physical Exam     Initial Vitals [06/21/17 1236]   BP Pulse Resp Temp SpO2   (!) 184/88 108 18 98.5 °F (36.9 °C) 100 %     Physical Exam    Vitals reviewed.  Constitutional: Vital signs are normal. He appears well-developed and well-nourished. He is not diaphoretic. No distress.   HENT:   Head: Normocephalic and atraumatic.   Nose: Nose normal.   Eyes: Conjunctivae and lids are normal. Pupils are equal, round, and reactive to light. Lids are everted and swept, no foreign bodies found.   Neck: Trachea normal and normal range of motion. Neck supple.   Cardiovascular: Normal rate, regular rhythm and normal pulses.   No murmur heard.  Pulmonary/Chest: Breath sounds normal. He has no wheezes. He has no rhonchi. He has no rales.   Abdominal: Soft. Normal appearance and bowel sounds are normal. He exhibits no distension. There is no tenderness.   Musculoskeletal: He exhibits edema and tenderness.        Left knee: He exhibits decreased range of motion. He exhibits no swelling, no effusion and no erythema.        Left foot: There is tenderness, bony tenderness and swelling. There is normal capillary refill and no deformity.   Neurological: He is alert and oriented to person, place, and time.   Skin: Skin is warm and dry. Capillary refill takes less than 2 seconds. No  abrasion, no bruising, no ecchymosis and no rash noted. No cyanosis or erythema.   Psychiatric: He has a normal mood and affect.         ED Course   Procedures  Labs Reviewed   CBC W/ AUTO DIFFERENTIAL - Abnormal; Notable for the following:        Result Value    WBC 13.38 (*)     RBC 4.49 (*)     Hemoglobin 10.4 (*)     Hematocrit 34.3 (*)     MCV 76 (*)     MCH 23.2 (*)     MCHC 30.3 (*)     RDW 15.9 (*)     Gran # 11.1 (*)     Gran% 83.1 (*)     Lymph% 9.6 (*)     All other components within normal limits   COMPREHENSIVE METABOLIC PANEL - Abnormal; Notable for the following:     BUN, Bld 22 (*)     Creatinine 1.9 (*)     Albumin 3.2 (*)     Alkaline Phosphatase 146 (*)     eGFR if  48.8 (*)     eGFR if non  42.2 (*)     All other components within normal limits   SEDIMENTATION RATE, MANUAL - Abnormal; Notable for the following:     Sed Rate 107 (*)     All other components within normal limits   C-REACTIVE PROTEIN - Abnormal; Notable for the following:     .7 (*)     All other components within normal limits   URIC ACID - Abnormal; Notable for the following:     Uric Acid 9.3 (*)     All other components within normal limits   TSH - Abnormal; Notable for the following:     TSH 0.381 (*)     All other components within normal limits   T4, FREE             Medical Decision Making:   History:   Old Medical Records: I decided to obtain old medical records.  Old Records Summarized: records from clinic visits.  Clinical Tests:   Lab Tests: Ordered and Reviewed  Radiological Study: Ordered and Reviewed  Medical Tests: Ordered and Reviewed       APC / Resident Notes:   42-year-old male with medical history of gout, hypertension, acid reflux presents to the ED with left foot and knee pain.  Cardiac exam reveals regular rate and rhythm.  Lungs clear bilaterally on auscultation with no decreased breath sounds.  Abdomen is soft, nontender, nondistended with normal bowel sounds ×4.   "Swelling, tenderness and decreased ROM noted to left foot.  No erythema. No swelling or decreased range of motion to the left ankle.  Decreased range of motion with No swelling or erythema noted to left knee.strength intact. Sensation intact. Distal pulses intact.     Patient given tramadol 100mg and IVF.    Labs and imaging reviewed.   WBC 13.38. BUN 22. Cr 1.9. . .7. Uric acid 9.3. TSH .381.     Imaging of left foot shows, "Diffuse soft tissue side without fracture. Unchanged findings which may be seen in the setting of gout."    Imaging of left knee shows, "Trace suprapatellar effusion."      DDX includes but is not limited to gout, fracture, osteomyelitis.    Discharged to home in stable condition, return to ED warnings given, follow up and patient care instructions given. Patient sent home on prednisone 5mg and norco 5mg.   Prednisone taper included 8 pills x 5 days. 6 pills x 5 days. 4 pills x 5 days. 2 pills x 5 days. 1 pill x 5 days.         I have discussed and reviewed with my supervising physician.       Scribe Attestation:   Scribe #1: I performed the above scribed service and the documentation accurately describes the services I performed. I attest to the accuracy of the note.    Attending Attestation:     Physician Attestation Statement for NP/PA:   I have conducted a face to face encounter with this patient in addition to the NP/PA, due to Medical Complexity    Other NP/PA Attestation Additions:    History of Present Illness: Pt with multiple previous ED visits for arthralgias, extremity pain and swelling. Has been admitted many times and multiple work ups thought to be originally due to infection but culture is always negative and presentation attributed to gout presents for 3rd time in a month for left foot pain. Pt was [facility] at [facility] in the beginning of the month and admitted for left knee pain and swelling again initially treated as an infection but arthrocentesis attributed as " gout.  Pt states that symptoms get better usually after medrol dosepak but occurs again a few days after finishing. Has had 3 medrol dose packs in the last month and colchicine without symptom relief. No NSAIDs due to CKD. Work up today shows a white count of 13. X-ray is unchanged when compared to previous. I believe his presentation today is consistent with previous episode of gout. Will treat with a longer course of steroids with slow taper and have pt follow up with outpatient rheumatology.          Physician Attestation for Scribe:  Physician Attestation Statement for Scribe #1: I, Dr. Parker, reviewed documentation, as scribed by Don Sheikh in my presence, and it is both accurate and complete.                 ED Course     Clinical Impression:   The primary encounter diagnosis was Chronic gout of left foot, unspecified cause. Diagnoses of Left knee pain, Foot pain, Tachycardia, and Left foot pain were also pertinent to this visit.    Disposition:   Disposition: Discharged  Condition: Stable                        Ana Rosa Persaud PA-C  06/21/17 2023

## 2017-08-05 ENCOUNTER — HOSPITAL ENCOUNTER (EMERGENCY)
Facility: HOSPITAL | Age: 42
Discharge: HOME | End: 2017-08-05
Attending: EMERGENCY MEDICINE
Payer: MEDICAID

## 2017-08-05 VITALS
HEIGHT: 70 IN | TEMPERATURE: 98 F | HEART RATE: 86 BPM | SYSTOLIC BLOOD PRESSURE: 131 MMHG | WEIGHT: 200 LBS | OXYGEN SATURATION: 98 % | DIASTOLIC BLOOD PRESSURE: 66 MMHG | BODY MASS INDEX: 28.63 KG/M2 | RESPIRATION RATE: 17 BRPM

## 2017-08-05 DIAGNOSIS — M10.9 ACUTE GOUT OF LEFT FOOT, UNSPECIFIED CAUSE: Primary | ICD-10-CM

## 2017-08-05 PROCEDURE — 99283 EMERGENCY DEPT VISIT LOW MDM: CPT

## 2017-08-05 PROCEDURE — 63600175 PHARM REV CODE 636 W HCPCS: Performed by: EMERGENCY MEDICINE

## 2017-08-05 PROCEDURE — 99283 EMERGENCY DEPT VISIT LOW MDM: CPT | Mod: ,,, | Performed by: EMERGENCY MEDICINE

## 2017-08-05 PROCEDURE — 25000003 PHARM REV CODE 250: Performed by: EMERGENCY MEDICINE

## 2017-08-05 RX ORDER — HYDROCODONE BITARTRATE AND ACETAMINOPHEN 5; 325 MG/1; MG/1
1 TABLET ORAL
Status: COMPLETED | OUTPATIENT
Start: 2017-08-05 | End: 2017-08-05

## 2017-08-05 RX ORDER — PREDNISONE 20 MG/1
40 TABLET ORAL
Status: COMPLETED | OUTPATIENT
Start: 2017-08-05 | End: 2017-08-05

## 2017-08-05 RX ORDER — PREDNISONE 5 MG/1
5 TABLET ORAL DAILY
Qty: 105 TABLET | Refills: 0 | Status: SHIPPED | OUTPATIENT
Start: 2017-08-05 | End: 2017-08-30

## 2017-08-05 RX ORDER — HYDROCODONE BITARTRATE AND ACETAMINOPHEN 5; 325 MG/1; MG/1
1 TABLET ORAL EVERY 4 HOURS PRN
Qty: 10 TABLET | Refills: 0 | Status: SHIPPED | OUTPATIENT
Start: 2017-08-05 | End: 2017-08-10 | Stop reason: ALTCHOICE

## 2017-08-05 RX ADMIN — PREDNISONE 40 MG: 20 TABLET ORAL at 02:08

## 2017-08-05 RX ADMIN — HYDROCODONE BITARTRATE AND ACETAMINOPHEN 1 TABLET: 5; 325 TABLET ORAL at 02:08

## 2017-08-05 NOTE — ED PROVIDER NOTES
"Encounter Date: 8/5/2017    SCRIBE #1 NOTE: I, Singh Boothe, am scribing for, and in the presence of, Dr. Parker.       History     Chief Complaint   Patient presents with    Gout     pt states "it's gout and cellulitis I've been admitted here before for the same problem"    Cellulitis     Time seen by provider: 2:22 PM.     This is a 42 y.o. male with history of HTN, GERD, renal disorder, gout (on allopurinol and colchicine), who presents with bilateral foot itchiness for the last few days and acute onset of swelling and pain most significant at the toes of both feet since last night. Pt states that her pain currently feels like pain typical to her gout, described as a stabbing/pulling pain. She reports chills since this morning, but she denies any fever. She states that she noticed some oozing from her right toes yesterday from a dampened sock. She denies any changes to her colchicine dosing, followed by her PCP for this. Pt denies any calf pain. She denies history of diabetes.       The history is provided by the patient.     Review of patient's allergies indicates:   Allergen Reactions    Nsaids (non-steroidal anti-inflammatory drug)      Hx of GI bleed     Past Medical History:   Diagnosis Date    Arthritis 10/11/2013    Blood transfusion     Cellulitis     GERD (gastroesophageal reflux disease)     GI bleed     Gout attack     Hypertension     Neuropathy     Osteomyelitis     Renal disorder      Past Surgical History:   Procedure Laterality Date    CYST REMOVAL      INCISION AND DRAINAGE OF WOUND      left hand third finger    SURERY ON LEFT MIDDLE FINGER      OSTEOMYLITIS     Family History   Problem Relation Age of Onset    Heart disease Mother     Diabetes Mother     Kidney disease Mother     Hypertension Mother     Stroke Mother     Cancer Mother      Social History   Substance Use Topics    Smoking status: Never Smoker    Smokeless tobacco: Never Used    Alcohol use No     Review of " Systems   Constitutional: Negative for fever.   HENT: Negative for sore throat.    Respiratory: Negative for shortness of breath.    Cardiovascular: Negative for chest pain.   Gastrointestinal: Negative for nausea.   Genitourinary: Negative for dysuria.   Musculoskeletal: Positive for arthralgias (bilateral feet) and joint swelling (bilateral feet). Negative for back pain.        Positive for bilateral foot itchiness.    Skin: Negative for rash.   Neurological: Negative for weakness.   Hematological: Does not bruise/bleed easily.       Physical Exam     Initial Vitals [08/05/17 1319]   BP Pulse Resp Temp SpO2   131/66 86 17 98.3 °F (36.8 °C) 98 %      MAP       87.67         Physical Exam    Nursing note and vitals reviewed.  Constitutional: He appears well-developed and well-nourished. He is not diaphoretic. No distress.   Musculoskeletal:   There is swelling and erythema of the left forefoot along the second and 4th toe that is tender to palpation and to ROM of the MTP.   Left ankle is mildly swollen with preserved ROM. There is tenderness to palpation of the ankle diffusely.   Left calf has no tenderness to palpation.   Right foot: first MTP has tenderness to palpation but no swelling and normal ROM.    Neurological: He is alert and oriented to person, place, and time.   Skin:   Right foot: first MTP has mild erythema  Left foot: no erythema at the ankle. Erythema of the left forefoot along the second and 4th toe.    Psychiatric: He has a normal mood and affect. His behavior is normal. Judgment and thought content normal.         ED Course   Procedures  Labs Reviewed - No data to display          Medical Decision Making:   History:   Old Medical Records: I decided to obtain old medical records.  Old Records Summarized: other records.       <> Summary of Records: Pt with history of HTN, GERD, renal disorder. He has multiple [facility] admissions for gouty flare, often suspected to be infectious but after extensive  workup fount to be gout and no infection.   Initial Assessment:   Pt with history of chronic recurrent gout, multiple previous visits, multiple previous courses of steroids. Pt is on allopurinol and colchicine regularly. Presentation today is consistent with another gouty flare up. Pt is afebrile. I do not suspect cellulitis. We will treat with slow steroid taper and have pt follow up with PCP.             Scribe Attestation:   Scribe #1: I performed the above scribed service and the documentation accurately describes the services I performed. I attest to the accuracy of the note.    Attending Attestation:           Physician Attestation for Scribe:  Physician Attestation Statement for Scribe #1: I, Dr. Parker, reviewed documentation, as scribed by Singh Boothe in my presence, and it is both accurate and complete.                 ED Course     Clinical Impression:   The encounter diagnosis was Acute gout of left foot, unspecified cause.    Disposition:   Disposition: Discharged  Condition: Stable                        Rachael Parker MD  08/05/17 2052

## 2017-08-10 ENCOUNTER — HOSPITAL ENCOUNTER (EMERGENCY)
Facility: HOSPITAL | Age: 42
Discharge: HOME | End: 2017-08-10
Attending: EMERGENCY MEDICINE
Payer: MEDICAID

## 2017-08-10 VITALS
TEMPERATURE: 99 F | WEIGHT: 200 LBS | OXYGEN SATURATION: 100 % | SYSTOLIC BLOOD PRESSURE: 156 MMHG | BODY MASS INDEX: 28.63 KG/M2 | HEART RATE: 100 BPM | RESPIRATION RATE: 18 BRPM | HEIGHT: 70 IN | DIASTOLIC BLOOD PRESSURE: 79 MMHG

## 2017-08-10 DIAGNOSIS — M79.673 CHRONIC FOOT PAIN, UNSPECIFIED LATERALITY: Primary | ICD-10-CM

## 2017-08-10 DIAGNOSIS — M79.672 PAIN, FOOT, CHRONIC, LEFT: ICD-10-CM

## 2017-08-10 DIAGNOSIS — G89.29 PAIN, FOOT, CHRONIC, RIGHT: ICD-10-CM

## 2017-08-10 DIAGNOSIS — G89.29 PAIN, FOOT, CHRONIC, LEFT: ICD-10-CM

## 2017-08-10 DIAGNOSIS — M79.671 PAIN, FOOT, CHRONIC, RIGHT: ICD-10-CM

## 2017-08-10 DIAGNOSIS — G89.29 CHRONIC FOOT PAIN, UNSPECIFIED LATERALITY: Primary | ICD-10-CM

## 2017-08-10 PROCEDURE — 99282 EMERGENCY DEPT VISIT SF MDM: CPT | Mod: ,,, | Performed by: PHYSICIAN ASSISTANT

## 2017-08-10 PROCEDURE — 99283 EMERGENCY DEPT VISIT LOW MDM: CPT

## 2017-08-10 RX ORDER — TRAMADOL HYDROCHLORIDE 50 MG/1
50 TABLET ORAL EVERY 6 HOURS PRN
Qty: 7 TABLET | Refills: 0 | Status: SHIPPED | OUTPATIENT
Start: 2017-08-10 | End: 2017-08-18

## 2017-08-11 NOTE — ED PROVIDER NOTES
Encounter Date: 8/10/2017       History     Chief Complaint   Patient presents with    Foot Problem     both feet, red  , itching and swollen     42-year-old male presents to the ER for evaluation of bilateral foot pain, worse in the left. This is a chronic complaint. Pt has had numerous visits here and other facilities for this complaint and has been admitted to multiple facilities multiple times with extensive evaluations.  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  He has CK D and cannot take NSAIDs.  Currently he is being managed with a steroid pack that was given to him 5 days ago for the same complaint.  He is compliant with allopurinol and takes colchicine when necessary.  He has not followed up with rheumatology.  He denies any trauma or injury denies any fever or chills.  He denies any nausea or vomiting.  He is in no acute distress.  He does not appear to be septic or ill.          Review of patient's allergies indicates:   Allergen Reactions    Nsaids (non-steroidal anti-inflammatory drug)      Hx of GI bleed     Past Medical History:   Diagnosis Date    Arthritis 10/11/2013    Blood transfusion     Cellulitis     GERD (gastroesophageal reflux disease)     GI bleed     Gout attack     Hypertension     Neuropathy     Osteomyelitis     Renal disorder      Past Surgical History:   Procedure Laterality Date    CYST REMOVAL      INCISION AND DRAINAGE OF WOUND      left hand third finger    SURERY ON LEFT MIDDLE FINGER      OSTEOMYLITIS     Family History   Problem Relation Age of Onset    Heart disease Mother     Diabetes Mother     Kidney disease Mother     Hypertension Mother     Stroke Mother     Cancer Mother      Social History   Substance Use Topics    Smoking status: Never Smoker    Smokeless tobacco: Never Used    Alcohol use No     Review of Systems   Constitutional: Negative for fever.   HENT: Negative for sore throat.     Respiratory: Negative for shortness of breath.    Cardiovascular: Negative for chest pain.   Gastrointestinal: Negative for nausea.   Genitourinary: Negative for dysuria.   Musculoskeletal: Positive for arthralgias and joint swelling. Negative for back pain.   Skin: Negative for rash.   Neurological: Negative for weakness.   Hematological: Does not bruise/bleed easily.       Physical Exam     Initial Vitals [08/10/17 1729]   BP Pulse Resp Temp SpO2   (!) 156/79 100 18 98.9 °F (37.2 °C) 100 %      MAP       104.67         Physical Exam    Constitutional: Vital signs are normal. He appears well-developed and well-nourished.   HENT:   Head: Normocephalic and atraumatic.   Eyes: Conjunctivae are normal.   Cardiovascular: Normal rate and regular rhythm.   Abdominal: Soft. Normal appearance, normal aorta and bowel sounds are normal.   Musculoskeletal: Normal range of motion.   Patient has tenderness to the ankles bilaterally  Both are swollen  Neither are red or warm  The left is more tender than the right  Range of motion is normal.  Patient is ambulatory without complication.    No redness noted   Neurological: He is alert and oriented to person, place, and time.   Skin: Skin is warm and intact.   Psychiatric: He has a normal mood and affect. His speech is normal and behavior is normal. Cognition and memory are normal.         ED Course   Procedures  Labs Reviewed - No data to display          Medical Decision Making:   ED Management:  42-year-old male with chronic foot pain.  Currently taking steroid pack for management of gouty arthritis.  He ran out of [place] yesterday and reports that is why he is here today, because he is out of pain medication.     He appears well.  He does not appear to be in any acute distress.    Do not believe this is a septic joint.  I do not believe he needs further evaluation at this time.  Patient will be discharged home to continue taking prednisone and a prescription for tramadol.  I  have advised patient follow up with PCP.              Attending Attestation:     Physician Attestation Statement for NP/PA:   I discussed this assessment and plan of this patient with the NP/PA, but I did not personally examine the patient. The face to face encounter was performed by the NP/PA.    Other NP/PA Attestation Additions:      Medical Decision Making: Bilateral foot pain                   ED Course     Clinical Impression:   The encounter diagnosis was Chronic foot pain, unspecified laterality.    Disposition:   Disposition: Discharged  Condition: Stable                        David Campbell PA-C  08/11/17 0604       Viviana Alfaro MD  08/13/17 0603

## 2017-08-11 NOTE — DISCHARGE INSTRUCTIONS
Follow up with her primary care provider  Continue taking steroids.  On return to the ER for any new symptoms

## 2017-08-11 NOTE — ED NOTES
LOC: The patient is awake, alert, and oriented to place, time, situation. Affect is appropriate.  Speech is appropriate and clear.     APPEARANCE: Patient resting comfortably in no acute distress.  Patient is clean and well groomed.    SKIN: The skin is warm and dry, and with redness to lower extremities.      MUSCULOSKELETAL: Patient moving upper without difficulty. Lower extremities with swelling and pain causing decrease in mobility.     RESPIRATORY: Airway is open and patent. Respirations spontaneous, even, easy, and non-labored.  Patient has a normal effort and rate.  No accessory muscle use noted. Denies cough.     CARDIAC:  No complaints of chest pain.      ABDOMEN: Soft and non tender to palpation.  No distention noted.     NEUROLOGIC: Eyes open spontaneously.  Behavior appropriate to situation.  Follows commands; facial expression symmetrical.  Purposeful motor response noted; normal sensation in all extremities.

## 2017-08-16 ENCOUNTER — HOSPITAL ENCOUNTER (EMERGENCY)
Facility: HOSPITAL | Age: 42
Discharge: HOME | End: 2017-08-16
Attending: FAMILY MEDICINE | Admitting: FAMILY MEDICINE
Payer: MEDICAID

## 2017-08-16 VITALS
HEART RATE: 77 BPM | OXYGEN SATURATION: 100 % | BODY MASS INDEX: 28.2 KG/M2 | SYSTOLIC BLOOD PRESSURE: 168 MMHG | RESPIRATION RATE: 15 BRPM | TEMPERATURE: 99 F | DIASTOLIC BLOOD PRESSURE: 87 MMHG | WEIGHT: 197 LBS | HEIGHT: 70 IN
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
## 2017-08-16 DIAGNOSIS — R07.89 OTHER CHEST PAIN: ICD-10-CM

## 2017-08-16 PROCEDURE — 99284 EMERGENCY DEPT VISIT MOD MDM: CPT

## 2017-08-16 PROCEDURE — 99283 EMERGENCY DEPT VISIT LOW MDM: CPT | Mod: ,,, | Performed by: PHYSICIAN ASSISTANT

## 2017-08-16 NOTE — ED TRIAGE NOTES
Fell yesterday when his foot got caught.  Complaining of pain to his left rib area and both knees.    GENERAL: The patient is well-developed and well-nourished in no apparent distress. Alert and oriented x4.                                                HEENT: Head is normocephalic and atraumatic. Extraocular muscles are intact. Pupils are equal, round, and reactive to light and accommodation. Nares appeared normal. Mouth is well hydrated and without lesions. Mucous membranes are moist. Posterior pharynx clear of any exudate or lesions.    NECK: Supple. No carotid bruits. No lymphadenopathy or thyromegaly.    LUNGS: Clear to auscultation.    HEART: Regular rate and rhythm without murmur.     ABDOMEN: Soft, nontender, and nondistended. Positive bowel sounds. No hepatosplenomegaly was noted.     EXTREMITIES: Contusions noted to both knees along with pitting edema.  NEUROLOGIC: Cranial nerves II through XII are grossly intact.     PSYCHIATRIC: Flat affect, but denies suicidal or homicidal ideations.    SKIN: No ulceration or induration present.

## 2017-08-16 NOTE — ED PROVIDER NOTES
Encounter Date: 8/16/2017       History     Chief Complaint   Patient presents with    Rib Injury     fell this afternoon     Patient is a 42-year-old male with past medical history of GERD, gout, hypertension, and arthritis who presents to the emergency department due to a 1 day history of left-sided chest tenderness.  Patient states that he was ambulating into work yesterday where he tripped and fell onto the cement hitting his head and the left side of his chest.  Patient states that since this occurrence he has had pain on the left side of his chest worse with coughing or deep breaths.  Patient denies any dizziness, lightheadedness, headaches, loss of consciousness, fevers, chills, chest pain, shortness of breath, or any other complaints.  Patient states he was concerned that he broke his ribs with the fall and therefore did present to the emergency department.          Review of patient's allergies indicates:   Allergen Reactions    Nsaids (non-steroidal anti-inflammatory drug)      Hx of GI bleed     Past Medical History:   Diagnosis Date    Arthritis 10/11/2013    Blood transfusion     Cellulitis     GERD (gastroesophageal reflux disease)     GI bleed     Gout attack     Hypertension     Neuropathy     Osteomyelitis     Renal disorder      Past Surgical History:   Procedure Laterality Date    CYST REMOVAL      INCISION AND DRAINAGE OF WOUND      left hand third finger    SURERY ON LEFT MIDDLE FINGER      OSTEOMYLITIS     Family History   Problem Relation Age of Onset    Heart disease Mother     Diabetes Mother     Kidney disease Mother     Hypertension Mother     Stroke Mother     Cancer Mother      Social History   Substance Use Topics    Smoking status: Never Smoker    Smokeless tobacco: Never Used    Alcohol use No     Review of Systems   Constitutional: Negative for activity change, appetite change, diaphoresis, fatigue and fever.   HENT: Negative for congestion, dental problem,  drooling, ear pain, facial swelling, sore throat and trouble swallowing.    Eyes: Negative for pain, discharge and visual disturbance.   Respiratory: Negative for apnea, cough, chest tightness and shortness of breath.    Cardiovascular: Negative for chest pain and palpitations.   Gastrointestinal: Negative for abdominal distention, anal bleeding, blood in stool, diarrhea, nausea and vomiting.   Endocrine: Negative for cold intolerance and polydipsia.   Genitourinary: Negative for decreased urine volume, difficulty urinating, enuresis, frequency and hematuria.   Musculoskeletal: Negative for arthralgias, gait problem, myalgias and neck stiffness.        Chest wall pain    Skin: Negative for color change and pallor.   Allergic/Immunologic: Negative for environmental allergies.   Neurological: Negative for dizziness, syncope, numbness and headaches.   Psychiatric/Behavioral: Negative for agitation, confusion and dysphoric mood.       Physical Exam     Initial Vitals [08/16/17 1334]   BP Pulse Resp Temp SpO2   128/63 98 16 98.9 °F (37.2 °C) 98 %      MAP       84.67         Physical Exam    Nursing note and vitals reviewed.  Constitutional: He appears well-developed and well-nourished. He is not diaphoretic. No distress.   HENT:   Head: Normocephalic and atraumatic.   Neck: Normal range of motion. Neck supple.   Cardiovascular: Normal rate, regular rhythm and normal heart sounds. Exam reveals no gallop and no friction rub.    No murmur heard.  Pulmonary/Chest: Breath sounds normal. He has no wheezes. He has no rhonchi. He has no rales.   Abdominal: Soft. Bowel sounds are normal. There is no tenderness. There is no rebound and no guarding.   Musculoskeletal: Normal range of motion.   Neurological: He is alert and oriented to person, place, and time.   Skin: Skin is warm and dry. No rash noted. No erythema.   Psychiatric: He has a normal mood and affect.         ED Course   Procedures  Labs Reviewed - No data to display                 APC / Resident Notes:   Patient is a 42-year-old male with past medical history of GERD, gout, hypertension, and arthritis who presents to the emergency department due to a 1 day history of left-sided chest tenderness.  Physical exam reveals male in no acute distress.  Heart regular rate and rhythm.  Lungs clear to auscultation bilaterally.  Abdomen soft nontender nondistended.  No bruises noted.  Pain on palpation under left breast.  Pain imaging and reassess.    CT head shows no acute intercranial abnormality.  Chest x-ray shows right humeral head abnormal appearance, which could be related to positioning with superimposition of osseous interfaces versus acute versus remote trauma.  Given that patient is having no pain in his right shoulder do not feel any additional imaging is warranted at this point.  Patient will be discharged in stable condition and is to follow-up with PCP in near future.  Plan of treatment discussed with attending physician and he is agreeable.                ED Course     Clinical Impression:   The encounter diagnosis was Fall.    Disposition:   Disposition: Discharged  Condition: Stable                        Anneliese Mckeon PA-C  08/16/17 4074

## 2017-08-18 ENCOUNTER — HOSPITAL ENCOUNTER (EMERGENCY)
Facility: OTHER | Age: 42
Discharge: HOME | End: 2017-08-19
Attending: EMERGENCY MEDICINE
Payer: MEDICAID

## 2017-08-18 DIAGNOSIS — R60.0 BILATERAL LEG EDEMA: ICD-10-CM

## 2017-08-18 PROCEDURE — 99283 EMERGENCY DEPT VISIT LOW MDM: CPT

## 2017-08-18 RX ORDER — TRAMADOL HYDROCHLORIDE 50 MG/1
50 TABLET ORAL
Status: COMPLETED | OUTPATIENT
Start: 2017-08-19 | End: 2017-08-19

## 2017-08-19 VITALS
SYSTOLIC BLOOD PRESSURE: 179 MMHG | HEART RATE: 82 BPM | HEIGHT: 70 IN | DIASTOLIC BLOOD PRESSURE: 99 MMHG | BODY MASS INDEX: 28.95 KG/M2 | WEIGHT: 202.19 LBS | TEMPERATURE: 99 F | OXYGEN SATURATION: 100 % | RESPIRATION RATE: 18 BRPM

## 2017-08-19 PROCEDURE — 25000003 PHARM REV CODE 250: Performed by: EMERGENCY MEDICINE

## 2017-08-19 RX ADMIN — TRAMADOL HYDROCHLORIDE 50 MG: 50 TABLET, COATED ORAL at 12:08

## 2017-08-19 NOTE — ED NOTES
C/O left knee pain and tightness to left calf area for unknown period of time. Has been diagnosed with gout in the past.

## 2017-08-19 NOTE — ED PROVIDER NOTES
Encounter Date: 8/18/2017    SCRIBE #1 NOTE: I, Dyan Spear, am scribing for, and in the presence of,  Dr. Ogden. I have scribed the entire note.       History     Chief Complaint   Patient presents with    Knee Pain     w/ L calf and both feet, was dx w/ gout last week, given Tramadol, but its not working     Time seen by provider: 11:30 PM    This is a 42 y.o. male who presents with complaint of bilateral foot swelling. He reports onset of symptoms was 2 weeks ago. The patient states the symptoms have been intermittent since onset. He notes associated bilateral foot pain and leg swelling. The patient does note the swelling is chronic but the swelling seems to be worsening. He notes he has been evaluated in the past for the symptoms. The patient states in the past the swelling and pain was attributed to Gout. He notes he was last evaluated last week for the symptoms. At which time he was prescribed steroids and Tramadol. The patient reports the use of the medications has provided no relief of symptoms. According to the patient's record the patient has been evaluated extensively for the symptoms with no acute findings. The patient denies any associated shortness of breath, chest pain, palpitations, nausea, vomiting, leg redness, open wounds, numbness, tingling or weakness in the lower extremities.       The history is provided by the patient.     Review of patient's allergies indicates:   Allergen Reactions    Nsaids (non-steroidal anti-inflammatory drug)      Hx of GI bleed     Past Medical History:   Diagnosis Date    Arthritis 10/11/2013    Blood transfusion     Cellulitis     GERD (gastroesophageal reflux disease)     GI bleed     Gout attack     Hypertension     Neuropathy     Osteomyelitis     Renal disorder      Past Surgical History:   Procedure Laterality Date    CYST REMOVAL      INCISION AND DRAINAGE OF WOUND      left hand third finger    SURERY ON LEFT MIDDLE FINGER      OSTEOMYLITIS      Family History   Problem Relation Age of Onset    Heart disease Mother     Diabetes Mother     Kidney disease Mother     Hypertension Mother     Stroke Mother     Cancer Mother      Social History   Substance Use Topics    Smoking status: Never Smoker    Smokeless tobacco: Never Used    Alcohol use No     Review of Systems   Constitutional: Negative for chills and fever.   HENT: Negative for congestion and sore throat.    Eyes: Negative for redness and visual disturbance.   Respiratory: Negative for cough and shortness of breath.    Cardiovascular: Positive for leg swelling (bilateral). Negative for chest pain and palpitations.   Gastrointestinal: Negative for abdominal pain, diarrhea, nausea and vomiting.   Genitourinary: Negative for dysuria.   Musculoskeletal: Negative for back pain and joint swelling.   Skin: Negative for rash.   Neurological: Negative for weakness and headaches.   Psychiatric/Behavioral: Negative for confusion.       Physical Exam     Initial Vitals [08/18/17 2253]   BP Pulse Resp Temp SpO2   (!) 163/85 96 20 98.4 °F (36.9 °C) 99 %      MAP       111         Physical Exam    Nursing note and vitals reviewed.  Constitutional: He appears well-developed and well-nourished. He is not diaphoretic. No distress.   HENT:   Head: Normocephalic and atraumatic.   Right Ear: External ear normal.   Left Ear: External ear normal.   Mouth/Throat: Oropharynx is clear and moist.   Eyes: Conjunctivae and EOM are normal.   Neck: Normal range of motion. Neck supple. No JVD present.   Cardiovascular: Normal rate, regular rhythm and normal heart sounds. Exam reveals no gallop and no friction rub.    No murmur heard.  Pulmonary/Chest: Breath sounds normal. He has no wheezes. He has no rhonchi. He has no rales.   Musculoskeletal: Normal range of motion. He exhibits edema. He exhibits no tenderness.   1+ pitting edema to bilateral LE up to below the knees.   Circumference of LLE larger than right. 2+ DP  pulses bilaterally. Sensation grossly intact bilaterally. No calf tenderness. Minimal erythema to bilateral LE but no warmth.    Neurological: He is alert and oriented to person, place, and time. He has normal strength.   Skin: Skin is warm and dry. No rash noted. There is erythema.         ED Course   Procedures  Labs Reviewed - No data to display     Imaging Results          US Lower Extremity Veins Bilateral (Final result)  Result time 08/19/17 00:46:57    Final result by Anna Lockhart MD (08/19/17 00:46:57)                 Impression:      No evidence of lower extremity DVT.        Electronically signed by: ANNA LOCKHART MD  Date:     08/19/17  Time:    00:46              Narrative:    Reason for study: Rule out DVT     Comparison: 1/2015.    Technique: Routine bilateral lower extremity venous ultrasound was performed using grayscale and color flow doppler spectral analysis.    Findings: No evidence of clot involving the bilateral common femoral, greater saphenous, femoral, popliteal, peroneal, anterior and posterior tibial veins.  All venous structures demonstrate normal respiratory phasicity and augment adequately.  No evidence of soft tissue mass or Baker's cyst.                               Medical Decision Making:   Clinical Tests:   Radiological Study: Ordered and Reviewed    Additional MDM:   Comments: 42-year-old male presents with complaints of bilateral lower extremity edema.  Patient reports that this occurs periodically.  On exam he had significant pitting edema to the lower extremities below the knees.  There is no evidence of cellulitis.  His left lower extremity with becoming larger than the right which she states is not normal for him.  Therefore, an ultrasound of both lower extremities were obtained and negative for DVT.  Patient was given compression stockings and counseled on supportive care for home and instructed to follow-up with his primary care doctor for further evaluation..           Scribe Attestation:   Scribe #1: I performed the above scribed service and the documentation accurately describes the services I performed. I attest to the accuracy of the note.    Attending Attestation:           Physician Attestation for Scribe:  Physician Attestation Statement for Scribe #1: I, Dr. Ogden, reviewed documentation, as scribed by Dyan Spear in my presence, and it is both accurate and complete.                 ED Course     Clinical Impression:     1. Bilateral leg edema                                 Angelica Ogden MD  08/19/17 0631

## 2017-08-25 ENCOUNTER — HOSPITAL ENCOUNTER (EMERGENCY)
Facility: HOSPITAL | Age: 42
Discharge: HOME | End: 2017-08-25
Attending: EMERGENCY MEDICINE
Payer: MEDICAID

## 2017-08-25 VITALS
DIASTOLIC BLOOD PRESSURE: 91 MMHG | TEMPERATURE: 98 F | OXYGEN SATURATION: 100 % | WEIGHT: 200 LBS | BODY MASS INDEX: 28.63 KG/M2 | HEIGHT: 70 IN | HEART RATE: 79 BPM | RESPIRATION RATE: 17 BRPM | SYSTOLIC BLOOD PRESSURE: 179 MMHG

## 2017-08-25 DIAGNOSIS — R52 PAIN: Primary | ICD-10-CM

## 2017-08-25 DIAGNOSIS — M79.671 FOOT PAIN, BILATERAL: ICD-10-CM

## 2017-08-25 DIAGNOSIS — M79.672 FOOT PAIN, BILATERAL: ICD-10-CM

## 2017-08-25 PROCEDURE — 99284 EMERGENCY DEPT VISIT MOD MDM: CPT | Mod: ,,, | Performed by: EMERGENCY MEDICINE

## 2017-08-25 PROCEDURE — 99283 EMERGENCY DEPT VISIT LOW MDM: CPT

## 2017-08-25 NOTE — ED TRIAGE NOTES
Returning to ER due to continued pain and swelling to his Left lower extremity and foot.  Pitting edema noted to BLE.    GENERAL: The patient is frail 42 year old male in no apparent distress. Alert and oriented x4.                                                HEENT: Head is normocephalic and atraumatic. Extraocular muscles are intact. Pupils are equal, round, and reactive to light and accommodation. Nares appeared normal. Mouth is well hydrated and without lesions. Mucous membranes are moist. Posterior pharynx clear of any exudate or lesions.    NECK: Supple. No carotid bruits. No lymphadenopathy or thyromegaly.    LUNGS: Clear to auscultation.    HEART: Regular rate and rhythm without murmur.     ABDOMEN: Soft, nontender, and nondistended. Positive bowel sounds. No hepatosplenomegaly was noted.     EXTREMITIES: Without any cyanosis, clubbing, rash, or lesions.     NEUROLOGIC: Cranial nerves II through XII are grossly intact.     PSYCHIATRIC: Flat affect, but denies suicidal or homicidal ideations.    SKIN: No ulceration or induration present.

## 2017-08-25 NOTE — ED PROVIDER NOTES
Encounter Date: 8/25/2017    SCRIBE #1 NOTE: IDon, am scribing for, and in the presence of, Anneliese Mckeon PA-C..       History     Chief Complaint   Patient presents with    Foot Pain     chronic pain- denies trauma.      Time patient was seen by the provider: 6:51 PM      The patient is a 42 y.o. male with hx of: GERD, gout attack, HTN, cellulitis, neuropathy, arthritis, osteomyelitis, and renal disorder that presents to the ED with a complaint of acute on chronic left foot swelling and pain, which began 3 weeks ago but worsened significantly 2 days ago. Pt describes a sharp pain when turning his foot to the right and a constant pain in the 4th and 5th toes. He reports a fall recently with trauma to the left great toe and anterior shin. Denies twisting his ankle during fall. Pt was seen in the ED for similar complaint in the past and prescribed steroids which had helped, but are no longer alleviating his swelling. States symptoms are not similar to past gout attacks.       The history is provided by the patient.     Review of patient's allergies indicates:   Allergen Reactions    Nsaids (non-steroidal anti-inflammatory drug)      Hx of GI bleed     Past Medical History:   Diagnosis Date    Arthritis 10/11/2013    Blood transfusion     Cellulitis     GERD (gastroesophageal reflux disease)     GI bleed     Gout attack     Hypertension     Neuropathy     Osteomyelitis     Renal disorder      Past Surgical History:   Procedure Laterality Date    CYST REMOVAL      INCISION AND DRAINAGE OF WOUND      left hand third finger    SURERY ON LEFT MIDDLE FINGER      OSTEOMYLITIS     Family History   Problem Relation Age of Onset    Heart disease Mother     Diabetes Mother     Kidney disease Mother     Hypertension Mother     Stroke Mother     Cancer Mother      Social History   Substance Use Topics    Smoking status: Never Smoker    Smokeless tobacco: Never Used    Alcohol use No      Review of Systems   Constitutional: Negative for fever.   HENT: Negative for sore throat.    Respiratory: Negative for shortness of breath.    Cardiovascular: Negative for chest pain.   Gastrointestinal: Negative for nausea.   Genitourinary: Negative for dysuria.   Musculoskeletal: Negative for back pain.        Foot swelling. Foot pain   Skin: Negative for rash.   Neurological: Negative for weakness.   Hematological: Does not bruise/bleed easily.       Physical Exam     Initial Vitals [08/25/17 1628]   BP Pulse Resp Temp SpO2   (!) 146/78 89 16 98.8 °F (37.1 °C) 98 %      MAP       100.67         Physical Exam    Nursing note and vitals reviewed.  Constitutional: He appears well-developed and well-nourished. He is not diaphoretic. No distress.   HENT:   Head: Normocephalic and atraumatic.   Neck: Normal range of motion. Neck supple.   Cardiovascular: Normal rate, regular rhythm and normal heart sounds. Exam reveals no friction rub.    No murmur heard.  Pulmonary/Chest: Breath sounds normal. He has no wheezes. He has no rhonchi. He has no rales.   Abdominal: Soft. There is no tenderness. There is no rebound and no guarding.   Musculoskeletal: Normal range of motion. He exhibits edema.   Edema bilaterally to feet  No redness or tenderness    Neurological: He is alert and oriented to person, place, and time.   Skin: Skin is warm and dry.         ED Course   Procedures  Labs Reviewed - No data to display                APC / Resident Notes:   The patient is a 42 y.o. male with hx of: GERD, gout attack, HTN, cellulitis, neuropathy, arthritis, osteomyelitis, and renal disorder that presents to the ED with a complaint of acute on chronic left foot swelling and pain, which began 3 weeks ago but worsened significantly 2 days ago.physical exam reveals male in no acute distress.  Heart regular rate and rhythm.  Lungs clear to auscultation bilaterally.  Abdomen soft nontender nondistended.  Palpable pulses bilaterally.   Edema to legs bilaterally.  No redness or tenderness noted.  Normal range of motion.  Given that patient recently had a negative ultrasound of lower extremities do not feel further imaging is warranted at this time.  Patient is continued on steroids for possible gout.  Patient told to return if symptoms worsen.       Scribe Attestation:   Scribe #1: I performed the above scribed service and the documentation accurately describes the services I performed. I attest to the accuracy of the note.    Attending Attestation:     Physician Attestation Statement for NP/PA:   I discussed this assessment and plan of this patient with the NP/PA, but I did not personally examine the patient. The face to face encounter was performed by the NP/PA.    Other NP/PA Attestation Additions:      Medical Decision Making: Chronic left foot pain and swelling.       Physician Attestation for Scribe:  Physician Attestation Statement for Scribe #1: I, Anneliese Mckeon PA-C., reviewed documentation, as scribed by Don Sheikh in my presence, and it is both accurate and complete.                 ED Course     Clinical Impression:   The encounter diagnosis was Pain.    Disposition:   Disposition: Discharged  Condition: Stable                        Anneliese Mckeon PA-C  08/25/17 2040       Viviana Alfaro MD  08/28/17 2033

## 2017-09-11 ENCOUNTER — HOSPITAL ENCOUNTER (EMERGENCY)
Facility: HOSPITAL | Age: 42
Discharge: HOME | End: 2017-09-11
Attending: EMERGENCY MEDICINE
Payer: MEDICAID

## 2017-09-11 VITALS
OXYGEN SATURATION: 99 % | HEIGHT: 69 IN | DIASTOLIC BLOOD PRESSURE: 81 MMHG | HEART RATE: 76 BPM | TEMPERATURE: 98 F | SYSTOLIC BLOOD PRESSURE: 128 MMHG | BODY MASS INDEX: 30.36 KG/M2 | RESPIRATION RATE: 16 BRPM | WEIGHT: 205 LBS

## 2017-09-11 DIAGNOSIS — M79.89 LEG SWELLING: Primary | ICD-10-CM

## 2017-09-11 LAB
ALBUMIN SERPL BCP-MCNC: 3.2 G/DL
BUN SERPL-MCNC: 29 MG/DL (ref 6–30)
CHLORIDE SERPL-SCNC: 107 MMOL/L (ref 95–110)
CREAT SERPL-MCNC: 2.2 MG/DL (ref 0.5–1.4)
GLUCOSE SERPL-MCNC: 84 MG/DL (ref 70–110)
HCT VFR BLD CALC: 31 %PCV (ref 36–54)
POC IONIZED CALCIUM: 0.97 MMOL/L (ref 1.06–1.42)
POC TCO2 (MEASURED): 23 MMOL/L (ref 23–29)
POTASSIUM BLD-SCNC: 4.4 MMOL/L (ref 3.5–5.1)
SAMPLE: ABNORMAL
SODIUM BLD-SCNC: 139 MMOL/L (ref 136–145)

## 2017-09-11 PROCEDURE — 82040 ASSAY OF SERUM ALBUMIN: CPT

## 2017-09-11 PROCEDURE — 25000003 PHARM REV CODE 250: Performed by: EMERGENCY MEDICINE

## 2017-09-11 PROCEDURE — 99283 EMERGENCY DEPT VISIT LOW MDM: CPT

## 2017-09-11 PROCEDURE — 99284 EMERGENCY DEPT VISIT MOD MDM: CPT | Mod: ,,, | Performed by: EMERGENCY MEDICINE

## 2017-09-11 RX ORDER — FUROSEMIDE 20 MG/1
10 TABLET ORAL DAILY
Qty: 3 TABLET | Refills: 0 | Status: SHIPPED | OUTPATIENT
Start: 2017-09-11 | End: 2018-02-02

## 2017-09-11 RX ORDER — TRAMADOL HYDROCHLORIDE 50 MG/1
50 TABLET ORAL
Status: COMPLETED | OUTPATIENT
Start: 2017-09-11 | End: 2017-09-11

## 2017-09-11 RX ADMIN — TRAMADOL HYDROCHLORIDE 50 MG: 50 TABLET, FILM COATED ORAL at 09:09

## 2017-09-12 NOTE — ED PROVIDER NOTES
Encounter Date: 9/11/2017    SCRIBE #1 NOTE: ISingh, stewart scribing for, and in the presence of, Dr. Parker.       History     Chief Complaint   Patient presents with    Foot Swelling     pain , swelling no drainage,      Time seen by provider: 8:42 PM    This is a 42 y.o. male with history of GERD, gout, hypertension, presents with bilateral leg swelling, particularly at the bilateral feet, left greater than right which has been gradually been worsening over the last few weeks but significantly increased for the last 2 days. He endorses redness of his left foot since last night and pain when he steps on his left foot.  Patient has been seen in the ED 5 times in the last month for similar. He has followed up with his PCP and Infectious Disease at [facility] and has tried antibiotics and steroids without much response. Patient last finished a course of steroids 10 days ago. No current antibiotic regimen. He denies chest pain or SOB, but endorses some chills without fever. Patient does recall temporary improvement of the swelling with a course of Lasix after being seen at [facility] for similar in January 2017.      The history is provided by the patient.     Review of patient's allergies indicates:   Allergen Reactions    Nsaids (non-steroidal anti-inflammatory drug)      Hx of GI bleed     Past Medical History:   Diagnosis Date    Arthritis 10/11/2013    Blood transfusion     Cellulitis     GERD (gastroesophageal reflux disease)     GI bleed     Gout attack     Hypertension     Neuropathy     Osteomyelitis     Renal disorder      Past Surgical History:   Procedure Laterality Date    CYST REMOVAL      INCISION AND DRAINAGE OF WOUND      left hand third finger    SURERY ON LEFT MIDDLE FINGER      OSTEOMYLITIS     Family History   Problem Relation Age of Onset    Heart disease Mother     Diabetes Mother     Kidney disease Mother     Hypertension Mother     Stroke Mother     Cancer Mother       Social History   Substance Use Topics    Smoking status: Never Smoker    Smokeless tobacco: Never Used    Alcohol use No     Review of Systems   Constitutional: Positive for chills. Negative for fever.   Respiratory: Negative for shortness of breath.    Cardiovascular: Positive for leg swelling. Negative for chest pain.   Musculoskeletal: Positive for myalgias (left foot).   Skin: Positive for color change (left foot).       Physical Exam     Initial Vitals [09/11/17 1633]   BP Pulse Resp Temp SpO2   (!) 123/56 72 18 98.7 °F (37.1 °C) 99 %      MAP       78.33         Physical Exam    Nursing note and vitals reviewed.  Constitutional: He appears well-developed and well-nourished. He is not diaphoretic. No distress.   Cardiovascular: Normal rate, regular rhythm and normal heart sounds.   No murmur heard.  Pulses heard with doppler.    Pulmonary/Chest: Breath sounds normal. No respiratory distress. He has no wheezes. He has no rhonchi. He has no rales.   Abdominal: Soft. There is no tenderness.   Musculoskeletal: He exhibits edema (bilateral pitting edema left greater than right).   No calf tenderness.   Skin: Skin is warm and dry. No rash noted. No erythema.         ED Course   Procedures  Labs Reviewed   ALBUMIN - Abnormal; Notable for the following:        Result Value    Albumin 3.2 (*)     All other components within normal limits   ISTAT PROCEDURE - Abnormal; Notable for the following:     POC Creatinine 2.2 (*)     POC Ionized Calcium 0.97 (*)     POC Hematocrit 31 (*)     All other components within normal limits             Medical Decision Making:   History:   Old Medical Records: I decided to obtain old medical records.  Old Records Summarized: other records.       <> Summary of Records: 42 y.o. male with history of gout, hypertension. Patient with multiple previous visits for gouty attacks. Last visit being august 25th. He has had 5 visits in the last month. He had DVT US done on 8/19 which was  normal.     Initial Assessment:   42 y.o. male with history of severe gout and CKD , presenting with progressively worsening bilateral lower extremity edema. He has had many visits in August at this ED as well as being followed at . He had recent DVT US which showed no clot. Patient given a course of antibiotics by  physicians with no improvement and had a slow taper of steroids that finished 10 days ago.  ISTAT shows a creatinine of 2.2 with baseline ranging 1.8-2.3. No evidence of cellulitis on exam. Patient states he had a similar episode that was treated with lasix in the past. We will try a short course and have patient follow up with his PCP.             Scribe Attestation:   Scribe #1: I performed the above scribed service and the documentation accurately describes the services I performed. I attest to the accuracy of the note.    Attending Attestation:           Physician Attestation for Scribe:  Physician Attestation Statement for Scribe #1: I, Dr. Parker, reviewed documentation, as scribed by Singh Boothe in my presence, and it is both accurate and complete.         Attending ED Notes:   .          ED Course      Clinical Impression:   The encounter diagnosis was Leg swelling.    Disposition:   Disposition: Discharged  Condition: Stable                        Rachael Parker MD  09/13/17 1924

## 2017-09-12 NOTE — ED NOTES
42 year old male pt presents to the ed with complaints of swelling to his left foot. Pt complains of pain 10/10 and difficulty walking. Pt denies any chest pain sob or nausea. Pt is awake alert and oriented x 4. Pt does states he has hx of gout.     Psychosocial: Patient is calm and cooperative. Patients insight and judgement are appropriate to situation. Appears clean, well maintained, with clothing appropriate to environment. No evidence of delusions, hallucinations, or psychosis.    Neuro: Eyes open spontaneously. Awake, alert, oriented x 4. Speech clear and appropriate. Tolerating saliva secretions well. Able to follow commands, demonstrating ability to actively and appropriately communicate within context of current conversation. Symmetrical facial muscles. Moving all extremities well with no noted weakness. Adequate muscle tone present. Movement is purposeful. No evidence of impaired sensation. Responds to external stimuli with appropriate reflexes.     Airway: Bilateral chest rise and fall. RR regular and non-labored. Air entry patent and clear x 5 lobes of the lungs. No crepitus or subcutaneous emphysema noted on palpation.     Circulatory: Skin warm, dry, and pink. Apical and radial pulses strong and regular. Capillary refill/skin blanching less than 3 seconds to distal of 4 extremities. Placed on CM in NSR without ectopy.    Abdomen: Abdomen obese, soft and non-distended. Positive normo-active bowel sounds x 4 quadrants.     Urinary: Patient reports routine urination without pain, frequency, or urgency. Voids independently. Reports urine appears cuauhtemoc/yellow in color.

## 2017-10-30 ENCOUNTER — HOSPITAL ENCOUNTER (EMERGENCY)
Facility: HOSPITAL | Age: 42
Discharge: HOME | End: 2017-10-30
Attending: EMERGENCY MEDICINE
Payer: MEDICAID

## 2017-10-30 VITALS
HEART RATE: 84 BPM | WEIGHT: 194 LBS | BODY MASS INDEX: 27.77 KG/M2 | RESPIRATION RATE: 18 BRPM | HEIGHT: 70 IN | DIASTOLIC BLOOD PRESSURE: 72 MMHG | OXYGEN SATURATION: 100 % | TEMPERATURE: 98 F | SYSTOLIC BLOOD PRESSURE: 138 MMHG

## 2017-10-30 DIAGNOSIS — R52 PAIN: ICD-10-CM

## 2017-10-30 DIAGNOSIS — S22.41XA CLOSED FRACTURE OF MULTIPLE RIBS OF RIGHT SIDE, INITIAL ENCOUNTER: Primary | ICD-10-CM

## 2017-10-30 PROCEDURE — 25000003 PHARM REV CODE 250: Performed by: EMERGENCY MEDICINE

## 2017-10-30 PROCEDURE — 99284 EMERGENCY DEPT VISIT MOD MDM: CPT | Mod: ,,, | Performed by: EMERGENCY MEDICINE

## 2017-10-30 PROCEDURE — 99283 EMERGENCY DEPT VISIT LOW MDM: CPT

## 2017-10-30 RX ORDER — HYDROCODONE BITARTRATE AND ACETAMINOPHEN 5; 325 MG/1; MG/1
1 TABLET ORAL EVERY 6 HOURS PRN
Qty: 12 TABLET | Refills: 0 | Status: SHIPPED | OUTPATIENT
Start: 2017-10-30 | End: 2018-02-02

## 2017-10-30 RX ORDER — HYDROCODONE BITARTRATE AND ACETAMINOPHEN 5; 325 MG/1; MG/1
1 TABLET ORAL
Status: COMPLETED | OUTPATIENT
Start: 2017-10-30 | End: 2017-10-30

## 2017-10-30 RX ADMIN — HYDROCODONE BITARTRATE AND ACETAMINOPHEN 1 TABLET: 5; 325 TABLET ORAL at 11:10

## 2017-10-31 NOTE — ED NOTES
Patient identifiers for Stu Mitchell checked and correct.  LOC: Patient is awake, alert, and aware of environment with an appropriate affect. Patient is oriented x 3 and speaking appropriately.  APPEARANCE: Patient resting comfortably and in no acute distress. Patient is clean and well groomed, patient's clothing is properly fastened.  SKIN: The skin is warm and dry. Patient has normal skin turgor and moist mucus membrances. Skin is intact; no bruising or breakdown noted.  MUSKULOSKELETAL: Patient is moving all extremities well, no obvious deformities noted. Pulses intact.   RESPIRATORY: Airway is open and patent. Respirations are spontaneous and non-labored with normal effort and rate.  CARDIAC: Patient has a normal rate and rhythm. No peripheral edema noted. Capillary refill < 3 seconds.  ABDOMEN: No distention noted. Bowel sounds active in all 4 quadrants. Soft and non-tender upon palpation.  NEUROLOGICAL: PERRL. Facial expression is symmetrical. Hand grasps are equal bilaterally. Normal sensation in all extremities when touched with finger.  Allergies reported:   Review of patient's allergies indicates:   Allergen Reactions    Nsaids (non-steroidal anti-inflammatory drug)      Hx of GI bleed

## 2017-10-31 NOTE — ED TRIAGE NOTES
Pt states that he fell two days ago on cement and is now c/o right rib pain. Pt states that the pain has gotten worse over the last two days and that the pain is worse when he coughs or lays down. Pt denies LOC and states that he tripped and fell the other day.

## 2017-10-31 NOTE — ED PROVIDER NOTES
"Encounter Date: 10/30/2017    SCRIBE #1 NOTE: I, Joy Reyes, am scribing for, and in the presence of,  Dr. Dobbs. I have scribed the following portions of the note - Other sections scribed: ANDERS SOARES.       History     Chief Complaint   Patient presents with    Rib pain     Pt reports right sided rib pain x "couple of days" that increases when he sneezes or coughs. Reports falling on cement a couple of days ago.     Time patient was seen by the provider: 10:06 PM      The patient is a 42 y.o. male with hx of: GERD, HTN, cellulitis, arthritis and CKD that presents to the ED with a complaint of right rib pain. Pt is s/p a fall two days ago where he tripped and landed on his right side. He denies head trauma and LOC. He reports that the pain is worse with movement and when he coughs or sneezes. He took tramadol for the pain but it did not help. Pt denies productive cough, abdominal pain, pain anywhere else, and bruising.      The history is provided by the patient and medical records.     Review of patient's allergies indicates:   Allergen Reactions    Nsaids (non-steroidal anti-inflammatory drug)      Hx of GI bleed     Past Medical History:   Diagnosis Date    Arthritis 10/11/2013    Blood transfusion     Cellulitis     GERD (gastroesophageal reflux disease)     GI bleed     Gout attack     Hypertension     Neuropathy     Osteomyelitis     Renal disorder      Past Surgical History:   Procedure Laterality Date    CYST REMOVAL      INCISION AND DRAINAGE OF WOUND      left hand third finger    SURERY ON LEFT MIDDLE FINGER      OSTEOMYLITIS     Family History   Problem Relation Age of Onset    Heart disease Mother     Diabetes Mother     Kidney disease Mother     Hypertension Mother     Stroke Mother     Cancer Mother      Social History   Substance Use Topics    Smoking status: Never Smoker    Smokeless tobacco: Never Used    Alcohol use No     Review of Systems   Constitutional: Negative for " chills and fever.   HENT: Negative for congestion.    Eyes: Negative for pain.   Respiratory: Negative for shortness of breath.    Cardiovascular: Negative for chest pain.   Gastrointestinal: Negative for abdominal pain, nausea and vomiting.   Genitourinary: Negative for difficulty urinating.   Musculoskeletal:        (+) right rib pain   Skin: Negative for color change and rash.   Neurological: Negative for syncope and headaches.       Physical Exam     Initial Vitals [10/30/17 1904]   BP Pulse Resp Temp SpO2   (!) 143/78 110 19 98.7 °F (37.1 °C) 100 %      MAP       99.67         Physical Exam    Constitutional: He appears well-developed and well-nourished. He is not diaphoretic. No distress.   HENT:   Head: Normocephalic and atraumatic.   Right Ear: External ear normal.   Left Ear: External ear normal.   Mouth/Throat: Oropharynx is clear and moist.   Eyes: Conjunctivae are normal. Pupils are equal, round, and reactive to light. Right eye exhibits no discharge. Left eye exhibits no discharge.   Neck: Normal range of motion. Neck supple. No JVD present.   nontender   Cardiovascular: Normal rate, regular rhythm and intact distal pulses.   No murmur heard.  Pulmonary/Chest: Breath sounds normal. No stridor. No respiratory distress. He has no wheezes. He has no rhonchi. He has no rales. He exhibits tenderness.   Tender R lateral chest wall along nipple line w/o crepitus or ecchymosis  BS symmetric   Abdominal: Soft. Bowel sounds are normal. He exhibits no distension. There is no rebound and no guarding.   Musculoskeletal: Normal range of motion. He exhibits no edema or tenderness.   Neurological: He is alert and oriented to person, place, and time.   Skin: Skin is warm and dry. No rash noted. No erythema.   Psychiatric: He has a normal mood and affect.         ED Course   Procedures  Labs Reviewed - No data to display       X-Rays:   Independently Interpreted Readings:   Other Readings:  Cxr:  fx's 5th and 7th ribs,  no ptx, no effusion    Medical Decision Making:   History:   Old Medical Records: I decided to obtain old medical records.  Initial Assessment:   R chest wall trauma, r/o fx vs contusion, r/o ptx, effusion.  Xrays, re-eval.  No abd pain or tenderness to suggest subdiaphragmatic injury.        xr w/ 2 rib fractures, no ptx, no effusion.  Pt states already has IS at home from prior injury and knows how to use it.  Short course of analgesics, f/u pmd, return if worse.  Pt indicates understanding.    Clinical Tests:   Radiological Study: Reviewed and Ordered            Scribe Attestation:   Scribe #1: I performed the above scribed service and the documentation accurately describes the services I performed. I attest to the accuracy of the note.            ED Course      Clinical Impression:   The primary encounter diagnosis was Closed fracture of multiple ribs of right side, initial encounter. A diagnosis of Pain was also pertinent to this visit.                           Mukund Dobbs MD  10/31/17 0349

## 2017-11-27 ENCOUNTER — HOSPITAL ENCOUNTER (EMERGENCY)
Facility: HOSPITAL | Age: 42
Discharge: HOME | End: 2017-11-27
Attending: EMERGENCY MEDICINE
Payer: MEDICAID

## 2017-11-27 VITALS
TEMPERATURE: 99 F | WEIGHT: 193 LBS | HEIGHT: 70 IN | DIASTOLIC BLOOD PRESSURE: 75 MMHG | SYSTOLIC BLOOD PRESSURE: 155 MMHG | OXYGEN SATURATION: 97 % | RESPIRATION RATE: 16 BRPM | HEART RATE: 99 BPM | BODY MASS INDEX: 27.63 KG/M2

## 2017-11-27 DIAGNOSIS — L03.116 CELLULITIS OF LEFT LEG: ICD-10-CM

## 2017-11-27 DIAGNOSIS — R60.0 BILATERAL LEG EDEMA: Primary | ICD-10-CM

## 2017-11-27 DIAGNOSIS — L03.90 CELLULITIS, UNSPECIFIED CELLULITIS SITE: ICD-10-CM

## 2017-11-27 PROCEDURE — 99284 EMERGENCY DEPT VISIT MOD MDM: CPT | Mod: ,,, | Performed by: PHYSICIAN ASSISTANT

## 2017-11-27 PROCEDURE — 25000003 PHARM REV CODE 250: Performed by: PHYSICIAN ASSISTANT

## 2017-11-27 PROCEDURE — 99283 EMERGENCY DEPT VISIT LOW MDM: CPT

## 2017-11-27 RX ORDER — CEPHALEXIN 500 MG/1
500 CAPSULE ORAL EVERY 6 HOURS
Qty: 28 CAPSULE | Refills: 0 | Status: SHIPPED | OUTPATIENT
Start: 2017-11-27 | End: 2017-12-04

## 2017-11-27 RX ORDER — HYDROCODONE BITARTRATE AND ACETAMINOPHEN 5; 325 MG/1; MG/1
1 TABLET ORAL
Status: COMPLETED | OUTPATIENT
Start: 2017-11-27 | End: 2017-11-27

## 2017-11-27 RX ORDER — CEPHALEXIN 500 MG/1
500 CAPSULE ORAL
Status: COMPLETED | OUTPATIENT
Start: 2017-11-27 | End: 2017-11-27

## 2017-11-27 RX ADMIN — CEPHALEXIN 500 MG: 500 CAPSULE ORAL at 07:11

## 2017-11-27 RX ADMIN — HYDROCODONE BITARTRATE AND ACETAMINOPHEN 1 TABLET: 5; 325 TABLET ORAL at 07:11

## 2017-11-28 NOTE — ED TRIAGE NOTES
"Pt presents to the ED c/o bilateral leg swelling x 2-3 days and left leg pain that began today. Pt states, "I think I have a wound or something because this wound keeps oozing." Pt also c/o left thigh and groin pain that began today.  "

## 2017-11-28 NOTE — ED PROVIDER NOTES
"Encounter Date: 11/27/2017    SCRIBE #1 NOTE: I, Singh Boothe, am scribing for, and in the presence of, Anneliese Morris PA-C.       History     Chief Complaint   Patient presents with    Leg Pain     L leg pain and thigh pain , swelling to calf with sores     Time seen by provider: 6:52 PM    This is a 42 y.o. male of hypertension, osteomyelitis, neuropathy, arthritis, CKD, GI bleed, who presents with complaint of painful wounds with white discharge on anterior left and right shin and left heel, associated with bilateral lower extremity swelling, left thigh pain, and left groin pain. Patient states that her lower extremity swelling started 2 days ago and worsened yesterday. He states that the wounds starting draining yesterday, have been increasing in size, and are now painful (described as "on fire") and mildly itchy. Patient endorses chills and palpitations without fever or SOB. Patient reports history of similar wounds, when he was previously diagnosed with cellulitis to his feet. He endorses off/on bilateral lower extremity swelling for months, for which he has had multiple ED visits and is currently taking Lasix.   Patient denies fever, SOB, recent antibiotic use, personal history of blood clots. Patient does note family history of blood clots.       The history is provided by the patient.     Review of patient's allergies indicates:   Allergen Reactions    Nsaids (non-steroidal anti-inflammatory drug)      Hx of GI bleed     Past Medical History:   Diagnosis Date    Arthritis 10/11/2013    Blood transfusion     Cellulitis     GERD (gastroesophageal reflux disease)     GI bleed     Gout attack     Hypertension     Neuropathy     Osteomyelitis     Renal disorder      Past Surgical History:   Procedure Laterality Date    CYST REMOVAL      INCISION AND DRAINAGE OF WOUND      left hand third finger    SURERY ON LEFT MIDDLE FINGER      OSTEOMYLITIS     Family History   Problem Relation Age of Onset    " Heart disease Mother     Diabetes Mother     Kidney disease Mother     Hypertension Mother     Stroke Mother     Cancer Mother      Social History   Substance Use Topics    Smoking status: Never Smoker    Smokeless tobacco: Never Used    Alcohol use No     Review of Systems   Constitutional: Positive for chills. Negative for fever.   HENT: Negative for sore throat.    Respiratory: Negative for shortness of breath.    Cardiovascular: Positive for palpitations and leg swelling. Negative for chest pain.   Gastrointestinal: Negative for nausea.   Genitourinary: Negative for dysuria.   Musculoskeletal: Negative for back pain.   Skin: Positive for wound. Negative for rash.   Neurological: Negative for weakness.   Hematological: Does not bruise/bleed easily.       Physical Exam     Initial Vitals [11/27/17 1554]   BP Pulse Resp Temp SpO2   131/82 (!) 118 18 98.6 °F (37 °C) 100 %      MAP       98.33         Physical Exam    Nursing note and vitals reviewed.  Constitutional: He appears well-developed and well-nourished.   HENT:   Head: Atraumatic.   Mouth/Throat: Oropharynx is clear and moist.   Eyes: Conjunctivae and EOM are normal. Pupils are equal, round, and reactive to light.   Neck: Normal range of motion. Neck supple.   Cardiovascular: Normal rate and regular rhythm.   Pulmonary/Chest: Breath sounds normal. No respiratory distress. He has no wheezes. He has no rales.   Musculoskeletal: He exhibits edema ( moderate bilateral lower leg swelling. ).   Neurological: He is alert and oriented to person, place, and time.   Skin: No rash noted. There is erythema (multiple lesions to anterior lower legs with overlying scabbing. On left anterior lower leg there is a 2x2 cm area of redness surrounding a scabbing with overlying tenderness.  no drainage or fluctuance. ).   Callus to the left heel without overlying erythema, ulcerations or drainage.    Psychiatric: He has a normal mood and affect.          Left  leg        Right leg     ED Course   Procedures  Labs Reviewed - No data to display          Medical Decision Making:   History:   Old Medical Records: I decided to obtain old medical records.       APC / Resident Notes:   Patient presents to the ER with chief complaint of bilateral lower leg swelling with multiple areas of scabbing.  He is concerned about a lesion on his left anterior shin, which he thinks might be infected.  He has chills, but denies fever.  He is afebrile with stable vital signs in the ED.   Patient has chronic bilateral LE swelling and says that he usually wears compression stockings but he has not been wearing these regularly due to discomfort in the area of his rash.  He denies associated chest pain or SOB and has no history of CHF.    He admits to scratching the legs.  He says that his rash and leg swelling has been intermittent since June of this year.  I reviewed the patients chart.  He has frequent visits to the ER, mostly for pain associated with gout.  I saw the patient January 2017 when he presented initially with bilateral lower leg swelling.  Creatinine was 1.8 and it was suspected that his swelling was due to dependent edema.  He is followed by a PCP at [facility].  He had an u/s of the legs during an ED visit August 2017, which was negative for DVT in the setting of bilateral leg swelling.  He also presented with leg swelling and pain 9/11/17.  Creatinine was 2.2.   Patients presentation is not concerning for gout.  This is likely dependent edema.  He is neurovascularly intact.  There is a small area concerning for cellulitis on the left leg surrounding excoriation so I will cover with keflex.  He will take tylenol for pain and f/u with PCP within 1 week for ER follow up exam.  He is advised to wear compression stockings and elevate the extremities.   I discussed the care of this patient with my supervising MD.         I, Anneliese Morris, personally performed the services  described in this documentation. All medical record entries made by the scribe were at my direction and in my presence.  I have reviewed the chart and agree that the record reflects my personal performance and is accurate and complete. Anneliese Morris, APC.  3:21 PM 11/28/2017         Scribe Attestation:   Scribe #1: I performed the above scribed service and the documentation accurately describes the services I performed. I attest to the accuracy of the note.            ED Course      Clinical Impression:   The primary encounter diagnosis was Bilateral leg edema. A diagnosis of Cellulitis, unspecified cellulitis site was also pertinent to this visit.                           AILYN Vega  11/28/17 1527       AILYN Vega  11/28/17 1529

## 2018-01-26 ENCOUNTER — HOSPITAL ENCOUNTER (EMERGENCY)
Facility: HOSPITAL | Age: 43
Discharge: HOME | End: 2018-01-26
Attending: EMERGENCY MEDICINE
Payer: MEDICAID

## 2018-01-26 VITALS
HEART RATE: 83 BPM | TEMPERATURE: 98 F | OXYGEN SATURATION: 100 % | BODY MASS INDEX: 27.26 KG/M2 | WEIGHT: 190 LBS | RESPIRATION RATE: 18 BRPM | DIASTOLIC BLOOD PRESSURE: 77 MMHG | SYSTOLIC BLOOD PRESSURE: 140 MMHG

## 2018-01-26 DIAGNOSIS — M10.9 ACUTE GOUT OF RIGHT HAND, UNSPECIFIED CAUSE: Primary | ICD-10-CM

## 2018-01-26 LAB
ANION GAP SERPL CALC-SCNC: 8 MMOL/L
BASOPHILS # BLD AUTO: 0.05 K/UL
BASOPHILS NFR BLD: 0.8 %
BUN SERPL-MCNC: 22 MG/DL
CALCIUM SERPL-MCNC: 9.3 MG/DL
CHLORIDE SERPL-SCNC: 104 MMOL/L
CO2 SERPL-SCNC: 26 MMOL/L
CREAT SERPL-MCNC: 1.5 MG/DL
CRP SERPL-MCNC: 19.8 MG/L
DIFFERENTIAL METHOD: ABNORMAL
EOSINOPHIL # BLD AUTO: 0.2 K/UL
EOSINOPHIL NFR BLD: 3.9 %
ERYTHROCYTE [DISTWIDTH] IN BLOOD BY AUTOMATED COUNT: 18.3 %
ERYTHROCYTE [SEDIMENTATION RATE] IN BLOOD BY WESTERGREN METHOD: 67 MM/HR
EST. GFR  (AFRICAN AMERICAN): >60 ML/MIN/1.73 M^2
EST. GFR  (NON AFRICAN AMERICAN): 56.2 ML/MIN/1.73 M^2
GLUCOSE SERPL-MCNC: 79 MG/DL
HCT VFR BLD AUTO: 27.3 %
HGB BLD-MCNC: 7.9 G/DL
IMM GRANULOCYTES # BLD AUTO: 0.02 K/UL
IMM GRANULOCYTES NFR BLD AUTO: 0.3 %
LYMPHOCYTES # BLD AUTO: 1.6 K/UL
LYMPHOCYTES NFR BLD: 26.5 %
MCH RBC QN AUTO: 19.8 PG
MCHC RBC AUTO-ENTMCNC: 28.9 G/DL
MCV RBC AUTO: 68 FL
MONOCYTES # BLD AUTO: 0.4 K/UL
MONOCYTES NFR BLD: 6.9 %
NEUTROPHILS # BLD AUTO: 3.7 K/UL
NEUTROPHILS NFR BLD: 61.6 %
NRBC BLD-RTO: 0 /100 WBC
PLATELET # BLD AUTO: 261 K/UL
PMV BLD AUTO: 11.5 FL
POTASSIUM SERPL-SCNC: 4.2 MMOL/L
RBC # BLD AUTO: 3.99 M/UL
SODIUM SERPL-SCNC: 138 MMOL/L
URATE SERPL-MCNC: 8.9 MG/DL
WBC # BLD AUTO: 6.08 K/UL

## 2018-01-26 PROCEDURE — 85025 COMPLETE CBC W/AUTO DIFF WBC: CPT

## 2018-01-26 PROCEDURE — 85651 RBC SED RATE NONAUTOMATED: CPT

## 2018-01-26 PROCEDURE — 84550 ASSAY OF BLOOD/URIC ACID: CPT

## 2018-01-26 PROCEDURE — 99284 EMERGENCY DEPT VISIT MOD MDM: CPT

## 2018-01-26 PROCEDURE — 80048 BASIC METABOLIC PNL TOTAL CA: CPT

## 2018-01-26 PROCEDURE — 99284 EMERGENCY DEPT VISIT MOD MDM: CPT | Mod: ,,, | Performed by: PHYSICIAN ASSISTANT

## 2018-01-26 PROCEDURE — 86140 C-REACTIVE PROTEIN: CPT

## 2018-01-26 PROCEDURE — 25000003 PHARM REV CODE 250: Performed by: PHYSICIAN ASSISTANT

## 2018-01-26 RX ORDER — PREDNISONE 20 MG/1
40 TABLET ORAL DAILY
Qty: 10 TABLET | Refills: 0 | Status: SHIPPED | OUTPATIENT
Start: 2018-01-26 | End: 2018-01-31

## 2018-01-26 RX ORDER — HYDROCODONE BITARTRATE AND ACETAMINOPHEN 5; 325 MG/1; MG/1
1 TABLET ORAL EVERY 6 HOURS PRN
Qty: 12 TABLET | Refills: 0 | Status: SHIPPED | OUTPATIENT
Start: 2018-01-26 | End: 2018-02-02

## 2018-01-26 RX ORDER — MORPHINE SULFATE 15 MG/1
15 TABLET ORAL
Status: COMPLETED | OUTPATIENT
Start: 2018-01-26 | End: 2018-01-26

## 2018-01-26 RX ADMIN — MORPHINE SULFATE 15 MG: 15 TABLET ORAL at 04:01

## 2018-01-26 NOTE — ED NOTES
Attempted to gain IV access--blood obtained and sent to lab--cath would not advance so discontinued--will reattempt if needed

## 2018-01-26 NOTE — ED TRIAGE NOTES
Presents to ER with pain and swelling to his right middle finger for 2 days.  There is no redness noted to his finger.    Pt identifiers checked and correct  LOC: The patient is awake, alert, aware of environment with an appropriate affect. Oriented x3, speaking appropriately  APPEARANCE: Pt resting comfortably, in no acute distress, pt is clean and well groomed, clothing properly fastened  SKIN: Skin warm, dry and intact, normal skin turgor, moist mucus membranes  RESPIRATORY: Airway is open and patent, respirations are spontaneous, even and unlabored, normal effort and rate  MUSCULOSKELETAL: No obvious deformities.

## 2018-01-26 NOTE — ED PROVIDER NOTES
Encounter Date: 1/26/2018    SCRIBE #1 NOTE: I, Jessy Carrero, am scribing for, and in the presence of,  Dr. Zan Gant. I have scribed the following portions of the note - the APC attestation.       History     Chief Complaint   Patient presents with    Finger Pain     Pain to right middle finger.      42-year-old male with a history significant for renal disorder, osteomyelitis, gout, GERD, HTN presents to the ED with cc of finger pain.  Patient reports a stinging and burning pain to the right middle finger for the past couple of days.  He denies injury.  Pain is the worst on the finger tip, but he also reports pain to the base of the finger. He reports associated swelling and decreased range of motion.  He reports some chills without fever.  Denies weakness, loss of sensation.  He mentions having previous infections in this finger requiring drainage and antibiotics.            Review of patient's allergies indicates:   Allergen Reactions    Nsaids (non-steroidal anti-inflammatory drug)      Hx of GI bleed     Past Medical History:   Diagnosis Date    Arthritis 10/11/2013    Blood transfusion     Cellulitis     GERD (gastroesophageal reflux disease)     GI bleed     Gout attack     Hypertension     Neuropathy     Osteomyelitis     Renal disorder      Past Surgical History:   Procedure Laterality Date    CYST REMOVAL      INCISION AND DRAINAGE OF WOUND      left hand third finger    SURERY ON LEFT MIDDLE FINGER      OSTEOMYLITIS     Family History   Problem Relation Age of Onset    Heart disease Mother     Diabetes Mother     Kidney disease Mother     Hypertension Mother     Stroke Mother     Cancer Mother      Social History   Substance Use Topics    Smoking status: Never Smoker    Smokeless tobacco: Never Used    Alcohol use No     Review of Systems   Constitutional: Positive for chills. Negative for fever.   Musculoskeletal:        R finger pain   Skin: Negative for rash.   Neurological:  Negative for numbness.       Physical Exam     Initial Vitals [01/26/18 1522]   BP Pulse Resp Temp SpO2   (!) 149/79 93 18 98.4 °F (36.9 °C) 100 %      MAP       102.33         Physical Exam    Nursing note and vitals reviewed.  Constitutional: He appears well-developed and well-nourished.  Non-toxic appearance. He does not appear ill. No distress.   HENT:   Head: Normocephalic and atraumatic.   Neck: Normal range of motion. Neck supple.   Cardiovascular: Normal rate and regular rhythm. Exam reveals no gallop, no distant heart sounds and no friction rub.    No murmur heard.  Pulmonary/Chest: Effort normal and breath sounds normal. No accessory muscle usage. No tachypnea. No respiratory distress. He has no decreased breath sounds. He has no wheezes. He has no rhonchi. He has no rales.   Abdominal: Normal appearance. He exhibits no distension.   Musculoskeletal: Normal range of motion.        Hands:  Swelling noted to the R 3rd digit. Tenderness over the palmar aspect of the digit most prominent to the finger pad.  Digit resting in flexion noted pain with passive extension and full flexion.  No warmth or erythema.   Neurological: He is alert and oriented to person, place, and time.   Skin: Skin is warm and dry. No rash noted. No pallor.   Psychiatric: He has a normal mood and affect. His behavior is normal. Judgment and thought content normal.         ED Course   Procedures  Labs Reviewed   SEDIMENTATION RATE, MANUAL - Abnormal; Notable for the following:        Result Value    Sed Rate 67 (*)     All other components within normal limits    Narrative:     LAVENDER SHARED   CBC W/ AUTO DIFFERENTIAL - Abnormal; Notable for the following:     RBC 3.99 (*)     Hemoglobin 7.9 (*)     Hematocrit 27.3 (*)     MCV 68 (*)     MCH 19.8 (*)     MCHC 28.9 (*)     RDW 18.3 (*)     All other components within normal limits    Narrative:     LAVENDER SHARED   URIC ACID - Abnormal; Notable for the following:     Uric Acid 8.9 (*)      All other components within normal limits   C-REACTIVE PROTEIN - Abnormal; Notable for the following:     CRP 19.8 (*)     All other components within normal limits   BASIC METABOLIC PANEL - Abnormal; Notable for the following:     BUN, Bld 22 (*)     Creatinine 1.5 (*)     eGFR if non  56.2 (*)     All other components within normal limits        Imaging Results          X-Ray Hand 3 view Right (Final result)  Result time 01/26/18 16:45:05    Final result by Fabiana Heller MD (01/26/18 16:45:05)                 Impression:      As above      Electronically signed by: FABIANA HELLER MD  Date:     01/26/18  Time:    16:45              Narrative:    Hand complete 3 view right    Clinical history: Right middle finger pain    Comparison: 10/1/2016    Findings:  3 views.    No acute displaced fracture or dislocation of the hand.  No discrete osseous abnormality involving the third digit noting there is edema about the third digit, similar to the previous exam.  There is a soft tissue nodule adjacent to the first carpal metacarpal joint, measuring approximately 1.5 cm, more prominent on the previous exam, correlation recommended.  Please note, lucent focus within the middle phalange of the third digit is less well-defined on today's exam.                               Medical Decision Making:   History:   Old Medical Records: I decided to obtain old medical records.  Differential Diagnosis:   My differential diagnosis includes but is not limited to: felon, cellulitis, gout, abscess, tenosynovitis, osteomyelitis   Clinical Tests:   Lab Tests: Ordered and Reviewed  Radiological Study: Ordered and Reviewed       APC / Resident Notes:   42-year-old male presents for evaluation of right middle finger pain for the past couple of days.  Vitals within normal limits.  Patient afebrile and in no acute distress. welling noted to the R 3rd digit. Tenderness over the palmar aspect of the digit most prominent to  the finger pad.  Digit resting in flexion noted pain with passive extension and full flexion.  No warmth or erythema.     Workup is remarkable for elevated ESR, CRP, uric acid.  X-ray without any acute osseous abnormalities.  Orthopedics was consulted and emergently evaluated the patient.  They do not feel that this is tenosynovitis.  They feel this is likely gout.  We will discharge patient in stable condition with a short course of steroids and pain medication.  Patient instructed to follow-up with orthopedics in one week for reevaluation.  Return because given. I have reviewed the patient's records and discussed this case with my supervising physician.           Scribe Attestation:   Scribe #1: I performed the above scribed service and the documentation accurately describes the services I performed. I attest to the accuracy of the note.    Attending Attestation:     Physician Attestation Statement for NP/PA:   I have conducted a face to face encounter with this patient in addition to the NP/PA, due to Medical Complexity    Other NP/PA Attestation Additions:      Medical Decision Making: I am in agreement with my physician assistant's assessment, treatment, and plan of care. Patient here for evaluation for felon vs flexor tenosynovitis. Orthopedics consulted who feel this is consistent with patient's previous gout. Arranged outpt follow up with Ortho. Pt extensively advised return precautions.         Physician Attestation for Scribe:      Comments: I, Dr. Zan Gant, personally performed the services described in this documentation. All medical record entries made by the scribe were at my direction and in my presence.  I have reviewed the chart and agree that the record reflects my personal performance and is accurate and complete. Zan Gant MD.  7:53 PM 01/26/2018              ED Course      Clinical Impression:   The encounter diagnosis was Acute gout of right hand, unspecified cause.    Disposition:    Disposition: Discharged  Condition: Stable                        Susan Vazquez PA-C  01/26/18 1714

## 2018-01-27 NOTE — CONSULTS
Consult Note  Orthopedic Surgery      SUBJECTIVE:     History of Present Illness:  Stu Mitchell is a RHD soto 42 y.o. male who presents with right long finger pain. Has had finger pain and swelling for past 3 days. Has had mild chills of unknown origin. Denies fevers or sweats. Has had extensive history of gout and had this particular finger explored in this hand revealing gout. Takes allopurinol daily and colchicine during flairs of gout.      Past Medical History:   Diagnosis Date    Arthritis 10/11/2013    Blood transfusion     Cellulitis     GERD (gastroesophageal reflux disease)     GI bleed     Gout attack     Hypertension     Neuropathy     Osteomyelitis     Renal disorder        Past Surgical History:   Procedure Laterality Date    CYST REMOVAL      INCISION AND DRAINAGE OF WOUND      left hand third finger    SURERY ON LEFT MIDDLE FINGER      OSTEOMYLITIS       Family History   Problem Relation Age of Onset    Heart disease Mother     Diabetes Mother     Kidney disease Mother     Hypertension Mother     Stroke Mother     Cancer Mother        Social History     Social History    Marital status: Single     Spouse name: N/A    Number of children: N/A    Years of education: N/A     Social History Main Topics    Smoking status: Never Smoker    Smokeless tobacco: Never Used    Alcohol use No    Drug use: No    Sexual activity: Yes     Partners: Female     Birth control/ protection: Condom     Other Topics Concern    None     Social History Narrative    None       No current facility-administered medications for this encounter.      Current Outpatient Prescriptions   Medication Sig Dispense Refill    allopurinol (ZYLOPRIM) 100 MG tablet Take 400 mg by mouth once daily.      colchicine 0.6 mg tablet Take 0.3 mg daily (please cut pill in half) 30 tablet 11    ferrous sulfate 325 (65 FE) MG EC tablet Take 1 tablet (325 mg total) by mouth once daily.      gabapentin (NEURONTIN)  300 MG capsule Take 1 capsule (300 mg total) by mouth 3 (three) times daily. 90 capsule 0    metoprolol tartrate (LOPRESSOR) 50 MG tablet Take 50 mg by mouth 2 (two) times daily.      pantoprazole (PROTONIX) 40 MG tablet Take 40 mg by mouth once daily.        furosemide (LASIX) 20 MG tablet Take 0.5 tablets (10 mg total) by mouth once daily. 3 tablet 0    hydrocodone-acetaminophen 5-325mg (NORCO) 5-325 mg per tablet Take 1 tablet by mouth every 6 (six) hours as needed for Pain. 12 tablet 0       Review of patient's allergies indicates:   Allergen Reactions    Nsaids (non-steroidal anti-inflammatory drug)      Hx of GI bleed         Review of Systems:  ROS: Patient denies constitutional symptoms, cardiac symptoms, respiratory symptoms, GI symptoms. The remainder of the musculoskeletal ROS is included in the HPI.      OBJECTIVE:     Vital Signs (Most Recent)  Vitals:    01/26/18 1522 01/26/18 1721 01/26/18 2034   BP: (!) 149/79 113/70 (!) 140/77   BP Location:  Left arm Right arm   Patient Position:  Sitting Sitting   Pulse: 93 86 83   Resp: 18 18 18   Temp: 98.4 °F (36.9 °C) 98.6 °F (37 °C) 98.2 °F (36.8 °C)   TempSrc: Oral Oral Oral   SpO2: 100% 100% 100%   Weight: 86.2 kg (190 lb)           Physical Exam:  Constitutional:  NAD; well-developed and well-nourished  Pulmonary/Chest: Effort normal  Skin: Warm and dry  Neuro: Alert and oriented to person, place, and time; no focal numbness or weakness    RUE:  Skin intact throughout  No erythema or cellulitis throughout  Mild swelling over P1 and P3 volarly of long finger  Can almost make a fist  No pain with passive extension of digit  No tenderness to palpation over pain  AIN, PIN, M, R, U intact  2+ radial pulse    Diagnostic Results:  X-Ray: Reviewed right hand: no acute fracture or dislocation or foreign body    ASSESSMENT/PLAN:     42 y.o. male with right long finger pain  - Given extensive history of gout, likely exacerbation of gout  - Discussed warning  signs to come back to ER urgently including, fevers, chills sweats, worsening pain, numbness, and tingling  - Follow-up in rheumatology clinic    Tonio Perdue MD PGY-2  Orthopedic Surgery

## 2018-01-29 ENCOUNTER — TELEPHONE (OUTPATIENT)
Dept: ORTHOPEDICS | Facility: CLINIC | Age: 43
End: 2018-01-29

## 2018-01-29 NOTE — TELEPHONE ENCOUNTER
Unable to reach out to pt at [phone]. To give information about [facility]. Pt need to call [facility] for an appointment for right hand gout, rheumatology [phone].

## 2018-01-29 NOTE — TELEPHONE ENCOUNTER
----- Message from Tonio Perdue MD sent at 1/27/2018  6:40 PM CST -----  Please schedule patient 1 week follow-up for right hand gout with [facility]. Thank you.

## 2018-02-02 ENCOUNTER — HOSPITAL ENCOUNTER (EMERGENCY)
Facility: HOSPITAL | Age: 43
Discharge: HOME | End: 2018-02-02
Attending: EMERGENCY MEDICINE
Payer: MEDICAID

## 2018-02-02 VITALS
WEIGHT: 170 LBS | HEIGHT: 70 IN | BODY MASS INDEX: 24.34 KG/M2 | DIASTOLIC BLOOD PRESSURE: 86 MMHG | HEART RATE: 78 BPM | SYSTOLIC BLOOD PRESSURE: 149 MMHG | RESPIRATION RATE: 20 BRPM | OXYGEN SATURATION: 97 % | TEMPERATURE: 98 F

## 2018-02-02 DIAGNOSIS — M10.9 GOUT, UNSPECIFIED CAUSE, UNSPECIFIED CHRONICITY, UNSPECIFIED SITE: Primary | ICD-10-CM

## 2018-02-02 DIAGNOSIS — M79.644 PAIN OF FINGER OF RIGHT HAND: ICD-10-CM

## 2018-02-02 DIAGNOSIS — M79.644 FINGER PAIN, RIGHT: ICD-10-CM

## 2018-02-02 PROCEDURE — 25000003 PHARM REV CODE 250: Performed by: EMERGENCY MEDICINE

## 2018-02-02 PROCEDURE — 99283 EMERGENCY DEPT VISIT LOW MDM: CPT

## 2018-02-02 PROCEDURE — 99283 EMERGENCY DEPT VISIT LOW MDM: CPT | Mod: ,,, | Performed by: EMERGENCY MEDICINE

## 2018-02-02 RX ORDER — PREDNISONE 5 MG/1
5 TABLET ORAL DAILY
COMMUNITY
End: 2018-02-02

## 2018-02-02 RX ORDER — COLCHICINE 0.6 MG/1
0.3 TABLET ORAL DAILY PRN
COMMUNITY
End: 2018-02-02

## 2018-02-02 RX ORDER — COLCHICINE 0.6 MG/1
TABLET ORAL
Qty: 30 TABLET | Refills: 0 | Status: SHIPPED | OUTPATIENT
Start: 2018-02-02 | End: 2018-02-02

## 2018-02-02 RX ORDER — PREDNISONE 5 MG/1
5 TABLET ORAL DAILY
Qty: 10 TABLET | Refills: 0 | Status: ON HOLD | OUTPATIENT
Start: 2018-02-02 | End: 2018-03-23 | Stop reason: HOSPADM

## 2018-02-02 RX ORDER — OXYCODONE AND ACETAMINOPHEN 5; 325 MG/1; MG/1
1 TABLET ORAL EVERY 4 HOURS PRN
Qty: 20 TABLET | Refills: 0 | Status: SHIPPED | OUTPATIENT
Start: 2018-02-02 | End: 2018-02-02

## 2018-02-02 RX ORDER — GABAPENTIN 300 MG/1
300 CAPSULE ORAL 3 TIMES DAILY
COMMUNITY

## 2018-02-02 RX ORDER — LISINOPRIL 10 MG/1
10 TABLET ORAL DAILY
COMMUNITY
End: 2018-02-02

## 2018-02-02 RX ORDER — COLCHICINE 0.6 MG/1
TABLET ORAL
Qty: 30 TABLET | Refills: 0 | Status: SHIPPED | OUTPATIENT
Start: 2018-02-02

## 2018-02-02 RX ORDER — ALLOPURINOL 100 MG/1
100 TABLET ORAL 4 TIMES DAILY
COMMUNITY
End: 2019-08-11

## 2018-02-02 RX ORDER — OXYCODONE AND ACETAMINOPHEN 5; 325 MG/1; MG/1
1 TABLET ORAL EVERY 4 HOURS PRN
Qty: 20 TABLET | Refills: 0 | Status: ON HOLD | OUTPATIENT
Start: 2018-02-02 | End: 2018-03-23 | Stop reason: HOSPADM

## 2018-02-02 RX ORDER — CYCLOSPORINE 25 MG/1
25 CAPSULE, GELATIN COATED ORAL EVERY 12 HOURS
COMMUNITY
End: 2018-02-02

## 2018-02-02 RX ORDER — OXYCODONE AND ACETAMINOPHEN 5; 325 MG/1; MG/1
1 TABLET ORAL
Status: COMPLETED | OUTPATIENT
Start: 2018-02-02 | End: 2018-02-02

## 2018-02-02 RX ORDER — HYDROCODONE BITARTRATE AND ACETAMINOPHEN 5; 325 MG/1; MG/1
1 TABLET ORAL EVERY 6 HOURS PRN
Status: ON HOLD | COMMUNITY
End: 2018-03-23 | Stop reason: HOSPADM

## 2018-02-02 RX ADMIN — OXYCODONE HYDROCHLORIDE AND ACETAMINOPHEN 1 TABLET: 5; 325 TABLET ORAL at 06:02

## 2018-02-02 NOTE — ED TRIAGE NOTES
Patient states he was seen in ED last week with ortho consult for swelling to right hand middle finger, states it is not better. Min edema redness to tip of right hand 3rd finger. States out of pain meds, 1 day left of steroids.

## 2018-02-02 NOTE — ED NOTES
Patient identifiers verified and correct for Mr Mitchell  C/C: Swelling at tip of finger  APPEARANCE: awake and alert in NAD.  SKIN: warm, dry and intact. No breakdown or bruising.  MUSCULOSKELETAL: Patient moving all extremities spontaneously, Slight edema to tip of finger, no redness noted, positive radial pulse Ambulates independently.  RESPIRATORY: Denies shortness of breath.Respirations unlabored.   CARDIAC: Denies CP, 2+ distal pulses; no peripheral edema  ABDOMEN: S/ND/NT, Denies nausea  : voids spontaneously, denies difficulty  Neurologic: AAO x 4; follows commands equal strength in all extremities; denies numbness/tingling. Denies dizziness

## 2018-02-02 NOTE — ED TRIAGE NOTES
Comes to the ED for c/o right knee, arm, and shoulder.  For pain that she reports she has had for 4 years.

## 2018-02-12 ENCOUNTER — HOSPITAL ENCOUNTER (EMERGENCY)
Facility: HOSPITAL | Age: 43
Discharge: HOME | End: 2018-02-12
Attending: EMERGENCY MEDICINE
Payer: MEDICAID

## 2018-02-12 VITALS
SYSTOLIC BLOOD PRESSURE: 166 MMHG | OXYGEN SATURATION: 99 % | HEART RATE: 84 BPM | TEMPERATURE: 99 F | RESPIRATION RATE: 18 BRPM | WEIGHT: 191 LBS | HEIGHT: 70 IN | DIASTOLIC BLOOD PRESSURE: 92 MMHG | BODY MASS INDEX: 27.35 KG/M2

## 2018-02-12 DIAGNOSIS — M79.673 PAIN OF FOOT, UNSPECIFIED LATERALITY: Primary | ICD-10-CM

## 2018-02-12 PROCEDURE — 99283 EMERGENCY DEPT VISIT LOW MDM: CPT

## 2018-02-12 PROCEDURE — 99283 EMERGENCY DEPT VISIT LOW MDM: CPT | Mod: ,,, | Performed by: PHYSICIAN ASSISTANT

## 2018-02-12 PROCEDURE — 25000003 PHARM REV CODE 250: Performed by: PHYSICIAN ASSISTANT

## 2018-02-12 RX ORDER — ACETAMINOPHEN 325 MG/1
650 TABLET ORAL
Status: COMPLETED | OUTPATIENT
Start: 2018-02-12 | End: 2018-02-12

## 2018-02-12 RX ADMIN — ACETAMINOPHEN 650 MG: 325 TABLET ORAL at 09:02

## 2018-02-13 NOTE — ED NOTES
LOC: Patient name and date of birth verified.  The patient is awake, alert and aware of environment with an appropriate affect, the patient is oriented x 3 and speaking appropriately.  Pt in NAD.    APPEARANCE: Patient resting comfortably and in no acute distress, patient is not clean and not  groomed, patient's clothing is properly fastened.  SKIN: The skin is warm and dry, color consistent with ethnicity, patient has normal skin turgor and moist mucus membranes, skin intact, no breakdown or brusing noted.  MUSCULOSKELETAL: Patient moving all extremities well, +2 pitting edema in both lower extremities  RESPIRATORY: Airway is open and patent, respirations are spontaneous, patient has a normal effort and rate, no accessory muscle use noted.  CARDIAC: Patient has a normal rate and rhythm, no periphreal edema noted, capillary refill < 3 seconds.  ABDOMEN: Soft and non tender to palpation, no distention noted. Bowel sounds present in all four quadrants.  NEUROLOGIC: Eyes open spontaneously, behavior appropriate to situation, follows commands, facial expression symmetrical, bilateral hand grasp equal and even, purposeful motor response noted, normal sensation in all extremities when touched with a finger.

## 2018-02-13 NOTE — ED TRIAGE NOTES
"Pt arrived to ED by self. C/o LLL pain mainly in the toes that radiates up that started a couple weeks ago with progressive increase in pain. States pain increase when ambulating. Has taken Norco 1tab 2 days ago. States pain feel like a "real sharp stabing pain". Rates pain 9/10.  "

## 2018-02-13 NOTE — DISCHARGE INSTRUCTIONS
Follow up with PCP  Use tylenol as needed for pain  Follow RICE instructions  Return to the ED for any new symptoms

## 2018-02-13 NOTE — ED PROVIDER NOTES
Encounter Date: 2/12/2018       History     Chief Complaint   Patient presents with    Foot Problem     c/o left 5th toe pain, denies injury or trauma. reports pain from standing all day at work. pain x 1 week.      42-year-old male to the ER for evaluation of foot pain.  He reports pain to the left foot for the past couple of days.  Denies any trauma or injury.  States his pain is probably from long hours at work during the [place] season.  He denies any fever, chills, nausea, vomiting.  Denies any rashes or lesions.  He is ambulatory.             Review of patient's allergies indicates:   Allergen Reactions    Nsaids (non-steroidal anti-inflammatory drug)      Hx of GI bleed     Past Medical History:   Diagnosis Date    Arthritis 10/11/2013    Blood transfusion     Cellulitis     GERD (gastroesophageal reflux disease)     GI bleed     Gout attack     Hypertension     Neuropathy     Osteomyelitis      Past Surgical History:   Procedure Laterality Date    CYST REMOVAL      INCISION AND DRAINAGE OF WOUND      left hand third finger    SURERY ON LEFT MIDDLE FINGER      OSTEOMYLITIS     Family History   Problem Relation Age of Onset    Heart disease Mother     Diabetes Mother     Kidney disease Mother     Hypertension Mother     Stroke Mother     Cancer Mother      Social History   Substance Use Topics    Smoking status: Never Smoker    Smokeless tobacco: Never Used    Alcohol use No     Review of Systems   Constitutional: Negative for fever.   HENT: Negative for sore throat.    Respiratory: Negative for shortness of breath.    Cardiovascular: Negative for chest pain.   Gastrointestinal: Negative for nausea.   Genitourinary: Negative for dysuria.   Musculoskeletal: Negative for back pain.   Skin: Negative for rash.   Neurological: Negative for weakness.   Hematological: Does not bruise/bleed easily.       Physical Exam     Initial Vitals [02/12/18 1858]   BP Pulse Resp Temp SpO2   (!) 172/100  86 18 98.6 °F (37 °C) 100 %      MAP       124         Physical Exam    Constitutional: Vital signs are normal. He appears well-developed and well-nourished.   HENT:   Head: Normocephalic and atraumatic.   Eyes: Conjunctivae are normal.   Cardiovascular: Normal rate and regular rhythm.   Abdominal: Soft. Normal appearance and bowel sounds are normal.   Musculoskeletal: Normal range of motion.   Normal physical exam of the left foot  No rashes or lesions  No swelling or redness  No tenderness on exam  Range of motion strength and sensation normal   Neurological: He is alert and oriented to person, place, and time.   Skin: Skin is warm and intact.   Psychiatric: He has a normal mood and affect. His speech is normal and behavior is normal. Cognition and memory are normal.         ED Course   Procedures  Labs Reviewed - No data to display          Medical Decision Making:   ED Management:  42-year-old male with left foot pain.  Patient frequently visits the ER for foot pain and hand pain which she believes is secondary to his work during the [place] season.   Physical exam of his left foot is unremarkable.  See no evidence of infection or inflammation.   There are no open injuries or physical exam findings to warrant to use of antibiotics or further evaluation.   I will treat with Tylenol for pain control and advised for him to follow-up with his PCP                   ED Course      Clinical Impression:   The encounter diagnosis was Pain of foot, unspecified laterality.                           David Campbell PA-C  02/12/18 4386

## 2018-02-19 ENCOUNTER — HOSPITAL ENCOUNTER (EMERGENCY)
Facility: HOSPITAL | Age: 43
Discharge: HOME | End: 2018-02-19
Attending: EMERGENCY MEDICINE
Payer: MEDICAID

## 2018-02-19 VITALS
BODY MASS INDEX: 26.63 KG/M2 | WEIGHT: 186 LBS | SYSTOLIC BLOOD PRESSURE: 136 MMHG | DIASTOLIC BLOOD PRESSURE: 97 MMHG | OXYGEN SATURATION: 100 % | HEART RATE: 90 BPM | TEMPERATURE: 99 F | RESPIRATION RATE: 18 BRPM | HEIGHT: 70 IN

## 2018-02-19 DIAGNOSIS — M79.672 FOOT PAIN, LEFT: Primary | ICD-10-CM

## 2018-02-19 PROCEDURE — 63600175 PHARM REV CODE 636 W HCPCS: Performed by: NURSE PRACTITIONER

## 2018-02-19 PROCEDURE — 99283 EMERGENCY DEPT VISIT LOW MDM: CPT | Mod: 25

## 2018-02-19 PROCEDURE — 96372 THER/PROPH/DIAG INJ SC/IM: CPT

## 2018-02-19 RX ORDER — DEXAMETHASONE SODIUM PHOSPHATE 4 MG/ML
8 INJECTION, SOLUTION INTRA-ARTICULAR; INTRALESIONAL; INTRAMUSCULAR; INTRAVENOUS; SOFT TISSUE
Status: COMPLETED | OUTPATIENT
Start: 2018-02-19 | End: 2018-02-19

## 2018-02-19 RX ADMIN — DEXAMETHASONE SODIUM PHOSPHATE 8 MG: 4 INJECTION, SOLUTION INTRAMUSCULAR; INTRAVENOUS at 02:02

## 2018-02-19 NOTE — ED PROVIDER NOTES
Encounter Date: 2/19/2018       History     Chief Complaint   Patient presents with    Foot Pain     left foot pain x 2 weeks that has been gradually increasing over the last 2 weeks.  Denies falling or trauma     Pt is a 42-year-old patient with pmhx of gout, osteomyelitis, chronic kidney disease, and HTN who presents to ED today with left foot pain x 2 weeks. Pt denies any injury or trauma to left foot. 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 Denies inability to bear weight on left foot. Denies numbness/tingling, weakness, SOB, chest pain, N/V, and abdominal pain. Has taken tramadol at home with no relief. Denies any other complaints at this time. Pt ambulatory.       The history is provided by the patient.   Foot Injury    The incident occurred at work. There was no injury mechanism. The incident occurred several weeks ago. The pain is present in the left foot. The pain is at a severity of 9/10. The pain has been constant since onset. Pertinent negatives include no numbness, no inability to bear weight, no loss of motion, no muscle weakness, no loss of sensation and no tingling. He reports no foreign bodies present. The symptoms are aggravated by activity.     Review of patient's allergies indicates:   Allergen Reactions    Nsaids (non-steroidal anti-inflammatory drug)      Hx of GI bleed     Past Medical History:   Diagnosis Date    Arthritis 10/11/2013    Blood transfusion     Cellulitis     GERD (gastroesophageal reflux disease)     GI bleed     Gout attack     Hypertension     Neuropathy     Osteomyelitis      Past Surgical History:   Procedure Laterality Date    CYST REMOVAL      INCISION AND DRAINAGE OF WOUND      left hand third finger    SURERY ON LEFT MIDDLE FINGER      OSTEOMYLITIS     Family History   Problem Relation Age of Onset    Heart disease Mother     Diabetes Mother     Kidney disease Mother      Hypertension Mother     Stroke Mother     Cancer Mother      Social History   Substance Use Topics    Smoking status: Never Smoker    Smokeless tobacco: Never Used    Alcohol use No     Review of Systems   Constitutional: Negative for chills and fever.   HENT: Negative for congestion and sore throat.    Respiratory: Negative for cough, chest tightness and shortness of breath.    Cardiovascular: Negative for chest pain, palpitations and leg swelling.   Gastrointestinal: Negative for abdominal pain, nausea and vomiting.   Genitourinary: Negative for difficulty urinating.   Musculoskeletal: Negative for back pain, gait problem, joint swelling, myalgias, neck pain and neck stiffness.        Left foot pain   Skin: Negative for rash.   Neurological: Negative for dizziness, tingling, syncope, weakness, numbness and headaches.   Hematological: Does not bruise/bleed easily.       Physical Exam     Initial Vitals [02/19/18 1258]   BP Pulse Resp Temp SpO2   (!) 141/85 101 18 98.8 °F (37.1 °C) 100 %      MAP       103.67         Physical Exam    Nursing note and vitals reviewed.  Constitutional: Vital signs are normal. He appears well-developed and well-nourished. He is not diaphoretic. He is active and cooperative.  Non-toxic appearance. He does not have a sickly appearance. He does not appear ill. No distress.   HENT:   Head: Normocephalic.   Right Ear: Hearing normal.   Left Ear: Hearing normal.   Nose: Nose normal.   Mouth/Throat: Uvula is midline, oropharynx is clear and moist and mucous membranes are normal.   Eyes: Conjunctivae and lids are normal. Pupils are equal, round, and reactive to light.   Neck: Trachea normal, normal range of motion, full passive range of motion without pain and phonation normal. Neck supple.   Cardiovascular: Normal rate, regular rhythm, normal heart sounds and normal pulses.   Pulses:       Radial pulses are 2+ on the right side, and 2+ on the left side.        Dorsalis pedis pulses  are 2+ on the right side, and 2+ on the left side.   Pulmonary/Chest: Effort normal and breath sounds normal. No respiratory distress. He has no decreased breath sounds.   Abdominal: Soft. Normal appearance and bowel sounds are normal. There is no tenderness.   Musculoskeletal:        Left foot: There is tenderness and swelling. There is normal range of motion, no bony tenderness, normal capillary refill, no crepitus, no deformity and no laceration.        Feet:    Neurological: He is alert and oriented to person, place, and time. He has normal strength. No sensory deficit. He exhibits normal muscle tone. Coordination and gait normal.   Reflex Scores:       Patellar reflexes are 2+ on the right side and 2+ on the left side.  Skin: Skin is warm, dry and intact. Capillary refill takes less than 2 seconds. No rash noted.   Psychiatric: He has a normal mood and affect. His speech is normal and behavior is normal. Judgment and thought content normal. Cognition and memory are normal.         ED Course   Procedures  Labs Reviewed - No data to display          Medical Decision Making:   Initial Assessment:   Pt is a 42-year-old male who presents to ED with left pain x 2 weeks. Denies any injury or trauma. Denies numbness/tingling to area. Pain and swelling noted to the side of his left foot. No bony tenderness noted. No deformity noted. No motor or sensory deficit noted. DP 2+ bilaterally by palpation. Pt ambulatory.   Differential Diagnosis:   Sprain, strain, fracture, dislocation  ED Management:  Exam, decadron  No imaging needed at this time. Physician and I agree that pt is stable and ready to be discharged home. Pt ambulatory. Pt instructed to return to ED if he cannot walk or has any numbness/tingling to area. Pt instructed to f/u with PCP within 2-3 days. Pt verbalizes d/c instructions and is in agreement and in compliance with tx plan.              Attending Attestation:     Physician Attestation Statement for  NP/PA:   I have conducted a face to face encounter with this patient in addition to the NP/PA, due to NP/PA Request    Other NP/PA Attestation Additions:    History of Present Illness: Agree; 42-year-old male well-known to the emergency department presents to the emergency department complaining of left foot pain.  Reports onset a few weeks ago.  Reports he was ambulating a lot due to the morning rest season and this has exacerbated his pain.  He reports a diffuse, aching pain and some mild swelling that is worse with ambulation or movement and without alleviating factors.  No recent injury reported.  Denies any fever or focal weakness or numbness.   Physical Exam: Agree   Medical Decision Making: Agree; patient given IM Decadron and he is feeling somewhat better.  We discussed disposition including discharge with follow-up with his primary care physician, reasons to return to the emergency room.                    Clinical Impression:   The encounter diagnosis was Foot pain, left.                           Chase Linares NP  02/19/18 0550       Je Carbajal MD  02/20/18 1248

## 2018-02-27 ENCOUNTER — HOSPITAL ENCOUNTER (EMERGENCY)
Facility: OTHER | Age: 43
Discharge: HOME | End: 2018-02-28
Attending: EMERGENCY MEDICINE
Payer: MEDICAID

## 2018-02-27 VITALS
BODY MASS INDEX: 25.48 KG/M2 | DIASTOLIC BLOOD PRESSURE: 80 MMHG | RESPIRATION RATE: 18 BRPM | TEMPERATURE: 98 F | WEIGHT: 178 LBS | SYSTOLIC BLOOD PRESSURE: 139 MMHG | HEIGHT: 70 IN | OXYGEN SATURATION: 100 % | HEART RATE: 77 BPM

## 2018-02-27 DIAGNOSIS — M79.673 FOOT PAIN: ICD-10-CM

## 2018-02-27 DIAGNOSIS — S92.302A CLOSED FRACTURE OF SHAFT OF METATARSAL BONE OF LEFT FOOT, INITIAL ENCOUNTER: Primary | ICD-10-CM

## 2018-02-27 PROCEDURE — 29125 APPL SHORT ARM SPLINT STATIC: CPT | Mod: LT

## 2018-02-27 PROCEDURE — 99283 EMERGENCY DEPT VISIT LOW MDM: CPT | Mod: 25

## 2018-02-27 PROCEDURE — 25000003 PHARM REV CODE 250: Performed by: EMERGENCY MEDICINE

## 2018-02-27 PROCEDURE — 29515 APPLICATION SHORT LEG SPLINT: CPT

## 2018-02-27 RX ORDER — ACETAMINOPHEN 500 MG
1000 TABLET ORAL
Status: COMPLETED | OUTPATIENT
Start: 2018-02-27 | End: 2018-02-27

## 2018-02-27 RX ADMIN — ACETAMINOPHEN 1000 MG: 500 TABLET ORAL at 11:02

## 2018-02-28 NOTE — ED TRIAGE NOTES
Patient presents to ED with c/o non traumatic L foot pain x 2-3 week. He reports worsening pain with walking. Mild swelling noted, no obvious deformities.

## 2018-02-28 NOTE — DISCHARGE INSTRUCTIONS
Keep the splint clean and dry.    We have provided you with a bone doctor  to follow up with.  Call to make an appointment and let them know you were seen in the Emergency Department and provided with a referral.      Please return to the ER if you have chest pain, difficulty breathing, fevers, altered mental status, dizziness, weakness, or any other concerns.

## 2018-02-28 NOTE — ED PROVIDER NOTES
"Encounter Date: 2/27/2018    SCRIBE #1 NOTE: I, Ana Rosa Ingram, am scribing for, and in the presence of, Dr. Arroyo.       History     Chief Complaint   Patient presents with    Foot Pain     Pt CO right foot pain Xs 2 weeks, denies injury.     Time seen by provider: 10:37 PM    This is a 42 y.o. Male, with history of gout, who presents with complaint of worsening left foot pain that began two weeks ago. He experiences more pain with bearing weight on the LLE. The patient reports intermittent swelling to the foot and feels as if "the toes are stiff." He has elevated the foot and applied ice with no improvement. The patient reports history of stress fractures to the left foot. He denies any trauma. He denies numbness/tingling. He denies any redness.  He denies f/c.        The history is provided by the patient.     Review of patient's allergies indicates:   Allergen Reactions    Nsaids (non-steroidal anti-inflammatory drug)      Hx of GI bleed     Past Medical History:   Diagnosis Date    Arthritis 10/11/2013    Blood transfusion     Cellulitis     GERD (gastroesophageal reflux disease)     GI bleed     Gout attack     Hypertension     Neuropathy     Osteomyelitis      Past Surgical History:   Procedure Laterality Date    CYST REMOVAL      INCISION AND DRAINAGE OF WOUND      left hand third finger    SURERY ON LEFT MIDDLE FINGER      OSTEOMYLITIS     Family History   Problem Relation Age of Onset    Heart disease Mother     Diabetes Mother     Kidney disease Mother     Hypertension Mother     Stroke Mother     Cancer Mother      Social History   Substance Use Topics    Smoking status: Never Smoker    Smokeless tobacco: Never Used    Alcohol use No     Review of Systems   Constitutional: Negative for chills and fever.   HENT: Negative for congestion.    Gastrointestinal: Negative for nausea and vomiting.   Musculoskeletal: Positive for joint swelling. Negative for back pain.        Positive for left " foot pain with associated swelling.   Skin: Negative for color change and rash.   Neurological: Negative for weakness and numbness.       Physical Exam     Initial Vitals [02/27/18 2208]   BP Pulse Resp Temp SpO2   139/80 77 18 97.7 °F (36.5 °C) 100 %      MAP       99.67         Physical Exam    Nursing note and vitals reviewed.  Constitutional: He appears well-developed and well-nourished. He is not diaphoretic. No distress.   HENT:   Head: Normocephalic and atraumatic.   Eyes: EOM are normal.   Neck: Normal range of motion.   Cardiovascular:   Pulses:       Dorsalis pedis pulses are 2+ on the left side.   2+ DP/PT pulses of LLE.     Pulmonary/Chest: No respiratory distress.   Musculoskeletal: Normal range of motion. He exhibits edema and tenderness.   Mild swelling to the left foot. Tenderness along the laterodorsal aspect of the left foot. No overlying erythema.    Neurological: He is alert and oriented to person, place, and time.   Skin: Skin is warm and dry. No rash and no abscess noted. No erythema. No pallor.   Psychiatric: He has a normal mood and affect. His behavior is normal. Judgment and thought content normal.         ED Course   Orthopedic Injury  Date/Time: 2/27/2018 11:53 PM  Performed by: DEB CAMARILLO  Authorized by: DEB CAMARILLO     Consent Done?:  Not Needed  Injury:     Injury location:  Foot    Location details:  Left foot    Injury type:  Fracture    Fracture type: fifth metatarsal        Pre-procedure assessment:     Neurovascular status: Neurovascularly intact      Distal perfusion: normal      Neurological function: normal      Range of motion: normal      Local anesthesia used?: No      Patient sedated?: No        Selections made in this section will also lock the Injury type section above.:     Manipulation performed?: No      Immobilization:  Splint    Splint type:  Short leg    Supplies used:  Ortho-Glass    Complications: No    Post-procedure assessment:     Neurovascular status:  Neurovascularly intact      Distal perfusion: normal      Neurological function: normal      Range of motion: normal      Patient tolerance:  Patient tolerated the procedure well with no immediate complications      Labs Reviewed - No data to display   Imaging Results          X-Ray Foot Complete Left (Final result)  Result time 02/27/18 23:02:58    Final result by Anna Lockhart MD (02/27/18 23:02:58)                 Impression:      Fifth metatarsal shaft fracture.      Electronically signed by: ANNA LOCKHART MD  Date:     02/27/18  Time:    23:02              Narrative:    Left foot 3 views.  Comparison: 6/2017.    Osteopenic changes are visualized.  There is nondisplaced transverse fracture involving the fifth metatarsal shaft.  No additional acute fracture or dislocation seen.  Lisfranc articulation appears congruent.  There is prominent diffuse soft tissue swelling seen over the dorsum of the foot.  Mild degenerative changes are seen within the midfoot.                                   X-Rays:   Independently Interpreted Readings:   Other Readings:  Left foot: Fifth metatarsal shaft fracture.     Medical Decision Making:   History:   Old Medical Records: I decided to obtain old medical records.  Old Records Summarized: records from previous admission(s) and other records.  Initial Assessment:   10:37PM:  Pt is a 43 y/o M who presents to ED with L foot pain x 2 weeks. Pt denies any injury, though pt does have swelling and tenderness along the 5th metatarsal. Will plan for xray, will continue to follow and reassess.    Clinical Tests:   Radiological Study: Ordered and Reviewed    11:26 PM:  Pt's xray indicates a 5th metatarsal shaft fracture.  I updated pt regarding results.  Will plan for posterior splint and crutches.  I counseled pt regarding splint care and supportive care measures.  Will plan to refer to ortho for f/u.  I have discussed with the pt ED return warnings.  Pt agreeable to plan and all  questions answered.  I feel that pt is stable for discharge and management as an outpatient and no further intervention is needed at this time.  Pt is comfortable returning to the ED if needed.  Will DC home in stable condition.                Scribe Attestation:   Scribe #1: I performed the above scribed service and the documentation accurately describes the services I performed. I attest to the accuracy of the note.    Attending Attestation:           Physician Attestation for Scribe:  Physician Attestation Statement for Scribe #1: I, Dr. Arroyo, reviewed documentation, as scribed by Ana Rosa Ingram in my presence, and it is both accurate and complete.                    Clinical Impression:     1. Closed fracture of shaft of metatarsal bone of left foot, initial encounter    2. Foot pain                                 Elizabeth Arroyo MD  02/28/18 0005

## 2018-03-05 ENCOUNTER — HOSPITAL ENCOUNTER (EMERGENCY)
Facility: HOSPITAL | Age: 43
Discharge: HOME | End: 2018-03-05
Attending: EMERGENCY MEDICINE
Payer: MEDICAID

## 2018-03-05 VITALS
WEIGHT: 183 LBS | SYSTOLIC BLOOD PRESSURE: 149 MMHG | RESPIRATION RATE: 16 BRPM | BODY MASS INDEX: 26.2 KG/M2 | HEIGHT: 70 IN | HEART RATE: 83 BPM | DIASTOLIC BLOOD PRESSURE: 80 MMHG | TEMPERATURE: 99 F | OXYGEN SATURATION: 100 %

## 2018-03-05 DIAGNOSIS — Z59.48 ACCIDENT DUE TO LACK OF FOOD: ICD-10-CM

## 2018-03-05 DIAGNOSIS — S92.355A NONDISPLACED FRACTURE OF FIFTH METATARSAL BONE, LEFT FOOT, INITIAL ENCOUNTER FOR CLOSED FRACTURE: ICD-10-CM
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
## 2018-03-05 DIAGNOSIS — M79.89 LEG SWELLING: Primary | ICD-10-CM

## 2018-03-05 DIAGNOSIS — S80.812A ABRASION OF LEFT LOWER EXTREMITY, INITIAL ENCOUNTER: ICD-10-CM
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
## 2018-03-05 PROCEDURE — 99284 EMERGENCY DEPT VISIT MOD MDM: CPT | Mod: 25

## 2018-03-05 PROCEDURE — 99283 EMERGENCY DEPT VISIT LOW MDM: CPT | Mod: ,,, | Performed by: PHYSICIAN ASSISTANT

## 2018-03-05 PROCEDURE — 25000003 PHARM REV CODE 250: Performed by: PHYSICIAN ASSISTANT

## 2018-03-05 PROCEDURE — 29515 APPLICATION SHORT LEG SPLINT: CPT | Mod: LT

## 2018-03-05 RX ORDER — HYDROCODONE BITARTRATE AND ACETAMINOPHEN 5; 325 MG/1; MG/1
1 TABLET ORAL
Status: COMPLETED | OUTPATIENT
Start: 2018-03-05 | End: 2018-03-05

## 2018-03-05 RX ORDER — MUPIROCIN 20 MG/G
OINTMENT TOPICAL 3 TIMES DAILY
Qty: 15 G | Refills: 0 | Status: SHIPPED | OUTPATIENT
Start: 2018-03-05 | End: 2020-09-22 | Stop reason: SDUPTHER

## 2018-03-05 RX ADMIN — HYDROCODONE BITARTRATE AND ACETAMINOPHEN 1 TABLET: 5; 325 TABLET ORAL at 08:03

## 2018-03-05 SDOH — SOCIAL DETERMINANTS OF HEALTH (SDOH): OTHER SPECIFIED LACK OF ADEQUATE FOOD: Z59.48

## 2018-03-06 NOTE — DISCHARGE INSTRUCTIONS
If you would like to follow up with the [facility] for further care of your fracture, please call the [facility] Scheduling Department at [phone] during business hours. Please let the  know you need a fracture follow-up appointment with Orthopedics, and you will be scheduled in the Orthopedic Clinic. Please bring your original Emergency Department discharge papers with you to the clinic appointment.

## 2018-03-06 NOTE — ED NOTES
LOC: The patient is awake, alert, aware of environment with an appropriate affect. Oriented x4, speaking appropriately  APPEARANCE: Pt resting comfortably, in no acute distress, pt is clean and well groomed, clothing properly fastened  SKIN:The skin is warm and dry, color consistent with ethnicity, patient has normal skin turgor and moist mucus membranes  MUSCULOSKELETAL: Patient moving all extremities spontaneously, no obvious swelling or deformities noted in upper extremities and RLE. Swelling noted on left lower extremity. Tenderness upon palpation on LLE.   NEUROLOGIC: PERRLA, facial expression is symmetrical, patient moving all extremities spontaneously, normal sensation in all extremities when touched with a finger.  Follows all commands appropriately  HEAD/FACE: The head is rounded; normocephalic and symmetrical.  RESPIRATORY:Airway is open and patent, respirations are spontaneous, patient has a normal effort and rate, no accessory muscle use noted. RR 20 equal and unlabored.   CARDIAC: Normal rate and rhythm, no peripheral edema noted, capillary refill < 3 seconds, bilateral radial pulses 2+.  ABDOMEN: S/ND/NT, normoactive bowel sounds present in all four quadrants. Normal stool pattern. Jugular veins are non-distended.

## 2018-03-06 NOTE — ED TRIAGE NOTES
Pt arrives to the ED via personal transport with a CC of leg swelling. Pt reports that [facility] was told [facility] that a bone in his foot was broken. Pt reports that his foot has had swelling in his affected foot since last night. Pt reports numbness and tingling in his last 3 toes that started this morning. Pt denies having diarrhea and fever. Pt reports vomiting and nausea. Pt is alert and oriented and in no acute distress at this time.

## 2018-03-06 NOTE — ED PROVIDER NOTES
Encounter Date: 3/5/2018    SCRIBE #1 NOTE: I, Courtney Mike, am scribing for, and in the presence of,  Dr. Almazan. I have scribed the following portions of the note - the APC attestation.       History     Chief Complaint   Patient presents with    Leg Swelling     pt states he started having swelling to the left ankle, left calf, and left foot for 2 days. pt recently had a splint on his left foot. was suppose to see orthopedics today but appointment was cancelled    insect bites     This is a 42 year old male with a PMH of HTN, gout, GI bleed who presents to the ED with a chief complaint of left leg swelling. Patient was seen and treated for a left 5th metatarsal fracture on 2/28/28. He was placed in a posterior slab splint and advised to remain nonweightbearing until follow up with orthopedics. Unfortunately he had an insurance lapse and was unable to make follow up. He reports persistent left foot pain and swelling extending into the lower leg. He removed the splint and is bearing weight to the extremity. He has noticed superficial abrasions which concerned him for insect bites. Denies fever, chills, vomiting,           Review of patient's allergies indicates:   Allergen Reactions    Nsaids (non-steroidal anti-inflammatory drug)      Hx of GI bleed     Past Medical History:   Diagnosis Date    Arthritis 10/11/2013    Blood transfusion     Cellulitis     GERD (gastroesophageal reflux disease)     GI bleed     Gout attack     Hypertension     Neuropathy     Osteomyelitis      Past Surgical History:   Procedure Laterality Date    CYST REMOVAL      INCISION AND DRAINAGE OF WOUND      left hand third finger    SURERY ON LEFT MIDDLE FINGER      OSTEOMYLITIS     Family History   Problem Relation Age of Onset    Heart disease Mother     Diabetes Mother     Kidney disease Mother     Hypertension Mother     Stroke Mother     Cancer Mother      Social History   Substance Use Topics    Smoking status:  Never Smoker    Smokeless tobacco: Never Used    Alcohol use No     Review of Systems   Constitutional: Negative for fever.   HENT: Negative for sore throat.    Respiratory: Negative for shortness of breath.    Cardiovascular: Positive for leg swelling. Negative for chest pain.   Gastrointestinal: Negative for nausea and vomiting.   Genitourinary: Negative for dysuria.   Musculoskeletal: Positive for arthralgias. Negative for back pain.   Skin: Positive for rash.   Neurological: Negative for weakness.   Hematological: Does not bruise/bleed easily.       Physical Exam     Initial Vitals [03/05/18 1909]   BP Pulse Resp Temp SpO2   (!) 148/71 93 17 98.2 °F (36.8 °C) 100 %      MAP       96.67         Physical Exam    Constitutional: He appears well-developed and well-nourished.   HENT:   Head: Atraumatic.   Eyes: Conjunctivae and EOM are normal. Pupils are equal, round, and reactive to light.   Cardiovascular: Normal rate, regular rhythm, normal heart sounds and intact distal pulses.   2+ Left lower leg edema   Pulmonary/Chest: Breath sounds normal. No respiratory distress. He has no wheezes. He has no rhonchi. He has no rales.   Abdominal: Soft. Bowel sounds are normal. There is no tenderness.   Neurological: He is alert and oriented to person, place, and time.   Skin: Skin is warm and dry. Abrasion and rash noted. No erythema.   Small superficial abrasions and excoriations of the left lower leg. There is no erythema, induration, or fluctuance. No wound drainage.         ED Course   Procedures  Labs Reviewed - No data to display      Imaging Results          US Lower Extremity Veins Bilateral (Final result)  Result time 03/05/18 21:49:27    Final result by Sumeet Goel MD (03/05/18 21:49:27)                 Impression:         No evidence of deep venous thrombosis in either lower extremity.      ______________________________________     Electronically signed by resident: EDMUNDO RAPP MD  Date:      03/05/18  Time:    21:47            As the supervising and teaching physician, I personally reviewed the images and resident's interpretation and I agree with the findings.          Electronically signed by: CARLOS WHITTINGTON MD  Date:     03/05/18  Time:    21:49              Narrative:    BILATERAL LOWER EXTREMITY DUPLEX ULTRASOUND.    Indication: other specified soft tissue disorders    Technique: Duplex scan of the bilateral lower extremities was performed using B-Mode/gray scale imaging, and Doppler spectral analysis and color flow.    Comparison: Lower extremity ultrasound 8/19/17    FINDINGS:    RIGHT LEG:  The common femoral, superficial femoral, popliteal, peroneal and anterior and posterior tibial veins show no signs of deep vein thrombosis.  The common femoral, superficial femoral and popliteal veins were examined using spectral analysis and show normal wave forms with normal response to augmentation and respiration.    LEFT LEG:  The common femoral, superficial femoral, popliteal, peroneal and anterior and posterior tibial veins show no signs of deep vein thrombosis.  The common femoral, superficial femoral and popliteal veins were examined using spectral analysis and show normal wave forms with normal response to augmentation and respiration.                                   Medical Decision Making:   History:   Old Medical Records: I decided to obtain old medical records.  Clinical Tests:   Radiological Study: Ordered and Reviewed       APC / Resident Notes:   42 year old male presents with left leg pain and swelling.  On exam he is afebrile and nontoxic. Hx of recent left 5th metatarsal, not currently in splint. There is tenderness to palpation over the fracture site and diffuse lower leg tenderness and edema. Several scattered excoriations without evidence of cellulitis or abscess. DVT study is negative. Edema likely dependent in nature vs swelling due to orthopedic injury. Walking boot applied.  Patient given discharge instructions to f/u with Orthopedics in 1-2 weeks. Discussed need to continue with walking boot, toe touch weight bearing x 6 weeks. I discussed the care of this patient with my supervising MD.        Scribe Attestation:   Scribe #1: I performed the above scribed service and the documentation accurately describes the services I performed. I attest to the accuracy of the note.    Attending Attestation:     Physician Attestation Statement for NP/PA:   I discussed this assessment and plan of this patient with the NP/PA, but I did not personally examine the patient. The face to face encounter was performed by the NP/PA.    Other NP/PA Attestation Additions:      Medical Decision Makin y.o. male presents for evaluation of left leg swelling.                     Clinical Impression:   The primary encounter diagnosis was Leg swelling. Diagnoses of Nondisplaced fracture of fifth metatarsal bone, left foot, initial encounter for closed fracture and Abrasion of left lower extremity, initial encounter were also pertinent to this visit.    Disposition:   Disposition: Discharged  Condition: Stable                        Cielo Flynn PA-C  [phone]

## 2018-03-08 ENCOUNTER — HOSPITAL ENCOUNTER (EMERGENCY)
Facility: HOSPITAL | Age: 43
Discharge: HOME | End: 2018-03-08
Attending: EMERGENCY MEDICINE
Payer: MEDICAID

## 2018-03-08 VITALS
DIASTOLIC BLOOD PRESSURE: 77 MMHG | TEMPERATURE: 98 F | BODY MASS INDEX: 25.77 KG/M2 | SYSTOLIC BLOOD PRESSURE: 142 MMHG | RESPIRATION RATE: 16 BRPM | WEIGHT: 180 LBS | HEART RATE: 93 BPM | HEIGHT: 70 IN | OXYGEN SATURATION: 100 %

## 2018-03-08 DIAGNOSIS — M79.89 LEFT LEG SWELLING: ICD-10-CM

## 2018-03-08 DIAGNOSIS — M79.662 PAIN IN LEFT SHIN: Primary | ICD-10-CM

## 2018-03-08 LAB
BILIRUB UR QL STRIP: NEGATIVE
CLARITY UR REFRACT.AUTO: CLEAR
COLOR UR AUTO: NORMAL
GLUCOSE UR QL STRIP: NEGATIVE
HGB UR QL STRIP: NEGATIVE
KETONES UR QL STRIP: NEGATIVE
LEUKOCYTE ESTERASE UR QL STRIP: NEGATIVE
NITRITE UR QL STRIP: NEGATIVE
PH UR STRIP: 5 [PH] (ref 5–8)
PROT UR QL STRIP: NEGATIVE
SP GR UR STRIP: 1.01 (ref 1–1.03)
URN SPEC COLLECT METH UR: NORMAL
UROBILINOGEN UR STRIP-ACNC: NEGATIVE EU/DL

## 2018-03-08 PROCEDURE — 99283 EMERGENCY DEPT VISIT LOW MDM: CPT | Mod: ,,, | Performed by: PHYSICIAN ASSISTANT

## 2018-03-08 PROCEDURE — 99284 EMERGENCY DEPT VISIT MOD MDM: CPT | Mod: 25

## 2018-03-08 PROCEDURE — 29515 APPLICATION SHORT LEG SPLINT: CPT

## 2018-03-08 PROCEDURE — 81003 URINALYSIS AUTO W/O SCOPE: CPT

## 2018-03-08 PROCEDURE — 25000003 PHARM REV CODE 250: Performed by: PHYSICIAN ASSISTANT

## 2018-03-08 RX ORDER — CEPHALEXIN 500 MG/1
500 CAPSULE ORAL EVERY 12 HOURS
Qty: 14 CAPSULE | Refills: 0 | Status: SHIPPED | OUTPATIENT
Start: 2018-03-08 | End: 2018-03-15

## 2018-03-08 RX ORDER — ACETAMINOPHEN 500 MG
1000 TABLET ORAL
Status: COMPLETED | OUTPATIENT
Start: 2018-03-08 | End: 2018-03-08

## 2018-03-08 RX ORDER — ACETAMINOPHEN 500 MG
TABLET ORAL
Status: DISPENSED
Start: 2018-03-08 | End: 2018-03-08

## 2018-03-08 RX ADMIN — ACETAMINOPHEN 1000 MG: 500 TABLET ORAL at 09:03

## 2018-03-08 NOTE — ED NOTES
Pt arrives for re-evaluation of left foot pain - recent fracture with swelling and was evaluated for a clot a few days ago and was told there was no clot - pt has an increase in pain to dorsal aspect of left foot with mild redness to shin noted - no streaks - pt also complains of fever last night of 100.4 and nausea     No shortness of breath noted - denies chest pains    +1 pulse DP left    2+ pedal edema

## 2018-03-08 NOTE — ED PROVIDER NOTES
Encounter Date: 3/8/2018    SCRIBE #1 NOTE: I, Scott Early, am scribing for, and in the presence of,  Dr. Almanzar. I have scribed the following portions of the note - the APC attestation.       History     Chief Complaint   Patient presents with    Leg Pain     L lower leg pain, swelling and redness  with some fever 100.6, had many ultrasounds one on monday was negative     42 year old male with PMH of HTN, CKD3, GERD, Gout presents with increased pain to left shin X 3 days.  Patient sustained a fracture to left 5th metatarsal on 2/27, was splinted and given referral to orthopedics.  Due to insurance issues, patient was unable to make follow up appointment and removed his splint himself.  Seen again at this ED 3/5 for increased pain to the extremity. Ultrasound done at the time was negative for blood clots.  Patient was given mupirocin cream for small irritated bumps on leg and walking boot. Today the patient is having continued pain ans swelling mostly localized to the distal shin. He also reports a fever last night to 100.3F.          Review of patient's allergies indicates:   Allergen Reactions    Nsaids (non-steroidal anti-inflammatory drug)      Hx of GI bleed     Past Medical History:   Diagnosis Date    Arthritis 10/11/2013    Blood transfusion     Cellulitis     GERD (gastroesophageal reflux disease)     GI bleed     Gout attack     Hypertension     Neuropathy     Osteomyelitis      Past Surgical History:   Procedure Laterality Date    CYST REMOVAL      INCISION AND DRAINAGE OF WOUND      left hand third finger    SURERY ON LEFT MIDDLE FINGER      OSTEOMYLITIS     Social History   Substance Use Topics    Smoking status: Never Smoker    Smokeless tobacco: Never Used    Alcohol use No     Review of Systems   Constitutional: Positive for chills, fatigue and fever. Negative for appetite change.   HENT: Negative for sore throat.    Respiratory: Negative for cough and shortness of breath.     Cardiovascular: Positive for leg swelling. Negative for chest pain and palpitations.   Gastrointestinal: Negative for abdominal pain, blood in stool, diarrhea and nausea.   Genitourinary: Positive for frequency. Negative for difficulty urinating, dysuria, flank pain and hematuria.   Musculoskeletal: Positive for arthralgias and gait problem.   Skin: Negative for rash.   Allergic/Immunologic: Negative for immunocompromised state.   Neurological: Negative for light-headedness and headaches.   Hematological: Does not bruise/bleed easily.       Physical Exam     Initial Vitals [03/08/18 0741]   BP Pulse Resp Temp SpO2   (!) 160/77 92 18 97.4 °F (36.3 °C) 99 %      MAP       104.67         Physical Exam    Nursing note and vitals reviewed.  Constitutional: He appears well-developed and well-nourished. He is not diaphoretic. No distress.   HENT:   Head: Normocephalic and atraumatic.   Eyes: EOM are normal. Pupils are equal, round, and reactive to light.   Neck: Normal range of motion. Neck supple.   Cardiovascular: Normal rate, regular rhythm and normal heart sounds.   Pulmonary/Chest: Breath sounds normal. No respiratory distress. He has no wheezes. He has no rales.   Abdominal: Soft. Bowel sounds are normal. He exhibits no distension. There is no tenderness. There is no rebound.   Musculoskeletal: Normal range of motion. He exhibits edema and tenderness.        Right shoulder: He exhibits swelling.   Left foot edematous and tender along lateral side. Slight swelling noted to left calf, tender to palpation. Tender along anterior shin.  Small red marks noted on anterior shin.  No obvious cellulitis or lymphatic streaming.   Neurological: He is alert and oriented to person, place, and time.   Skin: Skin is warm and dry. Capillary refill takes less than 2 seconds. There is erythema.   Psychiatric: He has a normal mood and affect.         ED Course   Procedures  Labs Reviewed   URINALYSIS, REFLEX TO URINE CULTURE      Imaging Results          US Lower Extremity Veins Bilateral (Final result)  Result time 03/08/18 13:20:35    Final result by Wagner Elizondo MD (03/08/18 13:20:35)                 Impression:        No evidence of deep venous thrombosis bilaterally.  ______________________________________     Electronically signed by resident: ALEX JOSEPH  Date:     03/08/18  Time:    12:21            As the supervising and teaching physician, I personally reviewed the images and resident's interpretation and I agree with the findings.          Electronically signed by: WAGNER ELIZONDO MD  Date:     03/08/18  Time:    13:20              Narrative:    Time of Procedure: 03/08/18 11:34:29  Accession # [id number]    Technique: Duplex and color flow Doppler evaluation of the bilateral lower extremities.    Comparison: Ultrasound lower extremity 03/05/2018.    Clinical Indication:   edema.    Findings:    Right - There is no evidence of acute venous thrombosis in the common femoral, superficial femoral, greater saphenous, popliteal, peroneal, anterior tibial and posterior tibial veins.  There is no reflux to suggest valvular incompetence.  There is subcutaneous edema.    Left - There is no evidence of acute venous thrombosis in the common femoral, superficial femoral, greater saphenous, popliteal, peroneal, anterior tibial and posterior tibial veins.  There is no reflux to suggest valvular incompetence.  There is subcutaneous edema.                             X-Ray Tibia Fibula 2 View Left (Final result)  Result time 03/08/18 09:25:47    Final result by Simón Rodriguez MD (03/08/18 09:25:47)                 Impression:     See above      Electronically signed by: Simón Rodriguez MD  Date:     03/08/18  Time:    09:25              Narrative:    2 views    Mild DJD.  No fracture or dislocation.  No bone destruction identified.  Osteopenia                                      Medical Decision Making:   Clinical Tests:   Lab Tests:  Ordered and Reviewed  Radiological Study: Ordered and Reviewed       APC / Resident Notes:   42 year old male presenting with left leg pain s/p 5th metatarsal fracture. Tender to palpation on exam, worst on anterior shin. No obvious cellulitis or infection but patient had a fever last night. Also reporting increased urinary frequency.  Ddx includes fracture of tibia, DVT, cellulitis, pain from walking boot.  Will get x-ray, repeat ultrasound and UA.    X-ray negative for fracture, US without DVT. UA negative. I suspect pain is due to chafing from the walking boot/ referred pain from foot fracture.  He has not been wearing the walking boot due to pain. Patient has not yet been able to follow up with orthopedics but has an appointment in 2 weeks at [facility]. I do not see any convincing signs of infection, however I do not have a good explanation for the fever last night.  I am concerned about poor follow up and thus will give Rx for keflex.  I do not believe this patient requires further ED treatment and is stable for discharge. Will give patient a hard soled shoe, hopefully he will be able to tolerate this better than the boot. Instructed patient to follow up with orthopedics and his PCP, Ed return precautions given.  Patient voiced understanding and is comfortable with discharge.  I discussed this patient with my supervising physician.    LAURA Oneilibaz Attestation:   Scribe #1: I performed the above scribed service and the documentation accurately describes the services I performed. I attest to the accuracy of the note.    Attending Attestation:     Physician Attestation Statement for NP/PA:   I discussed this assessment and plan of this patient with the NP/PA, but I did not personally examine the patient. The face to face encounter was performed by the NP/PA.    Other NP/PA Attestation Additions:    History of Present Illness: 41 y/o male complains of left lower leg pain and fever of 100.6  F. He was seen here on Feb 27 and dx'ed with a fx of the left 5th metatarsal and placed in a splint. He was then seen again on March 5th. He was not wearing the splint. Was given an orthopedic boot at that time. Lower extremity US was done, which was negative for DVT. Today he complains of pain and swelling of the left lower leg.     Medical Decision Making: We repeated the lower extremity Us, which was again negative for DVT. Xray of tibia and fibula is negative for fx. UA was negative for UTI. He will be placed on keflex for possible early cellulitis as a source of his low grade fever and leg pain.        Physician Attestation for Jasperibe:      Comments: I, Dr. Simi Almanzar, personally performed the services described in this documentation. All medical record entries made by the scribe were at my direction and in my presence.  I have reviewed the chart and agree that the record reflects my personal performance and is accurate and complete. Simi Almanzar MD.  5:12 PM 03/08/2018                 Clinical Impression:   The primary encounter diagnosis was Pain in left shin. A diagnosis of Left leg swelling was also pertinent to this visit.    Disposition:   Disposition: Discharged  Condition: Stable                        Marily Valladares PA-C  03/08/18 [id number]

## 2018-03-11 ENCOUNTER — HOSPITAL ENCOUNTER (EMERGENCY)
Facility: OTHER | Age: 43
Discharge: HOME | End: 2018-03-11
Attending: EMERGENCY MEDICINE
Payer: MEDICAID

## 2018-03-11 VITALS
HEART RATE: 70 BPM | WEIGHT: 170 LBS | HEIGHT: 69 IN | OXYGEN SATURATION: 99 % | BODY MASS INDEX: 25.18 KG/M2 | DIASTOLIC BLOOD PRESSURE: 70 MMHG | RESPIRATION RATE: 16 BRPM | TEMPERATURE: 98 F | SYSTOLIC BLOOD PRESSURE: 130 MMHG

## 2018-03-11 DIAGNOSIS — M79.644 FINGER PAIN, RIGHT: Primary | ICD-10-CM

## 2018-03-11 PROCEDURE — 99283 EMERGENCY DEPT VISIT LOW MDM: CPT

## 2018-03-11 NOTE — ED PROVIDER NOTES
Encounter Date: 3/11/2018    SCRIBE #1 NOTE: I, Aide Pretty, am scribing for, and in the presence of, Dr. Ogden.       History     Chief Complaint   Patient presents with    Hand Pain     pt has pain ti rt index finger around the nail, he thinks it may be infected     Time seen by provider: 3:24 AM    This is a 42 y.o. male who presents with complaint of right index finger pain for a couple days. The pain is located around the nail. He thinks he may have an infection. He has no other complaints, and denies fever, chills, SOB, finger swelling, wound, rash, numbness, or weakness.      The history is provided by the patient.     Review of patient's allergies indicates:   Allergen Reactions    Nsaids (non-steroidal anti-inflammatory drug)      Hx of GI bleed     Past Medical History:   Diagnosis Date    Arthritis 10/11/2013    Blood transfusion     Cellulitis     GERD (gastroesophageal reflux disease)     GI bleed     Gout attack     Hypertension     Neuropathy     Osteomyelitis      Past Surgical History:   Procedure Laterality Date    CYST REMOVAL      INCISION AND DRAINAGE OF WOUND      left hand third finger    SURERY ON LEFT MIDDLE FINGER      OSTEOMYLITIS     Family History   Problem Relation Age of Onset    Heart disease Mother     Diabetes Mother     Kidney disease Mother     Hypertension Mother     Stroke Mother     Cancer Mother      Social History   Substance Use Topics    Smoking status: Never Smoker    Smokeless tobacco: Never Used    Alcohol use No     Review of Systems   Constitutional: Negative for chills and fever.   HENT: Negative for sore throat.    Respiratory: Negative for shortness of breath.    Cardiovascular: Negative for chest pain.   Gastrointestinal: Negative for nausea.   Genitourinary: Negative for dysuria.   Musculoskeletal: Negative for back pain.        Positive for finger pain. Negative for finger swelling.   Skin: Negative for rash and wound.   Neurological:  Negative for weakness and numbness.   Hematological: Does not bruise/bleed easily.       Physical Exam     Initial Vitals [03/11/18 0038]   BP Pulse Resp Temp SpO2   (!) 140/72 90 18 98.5 °F (36.9 °C) 100 %      MAP       94.67         Physical Exam    Nursing note and vitals reviewed.  Constitutional: He appears well-developed and well-nourished. He is not diaphoretic. No distress.   HENT:   Head: Normocephalic and atraumatic.   Eyes: Conjunctivae and EOM are normal. Right eye exhibits no discharge. Left eye exhibits no discharge.   Neck: Normal range of motion. Neck supple.   Cardiovascular: Normal rate, regular rhythm and normal heart sounds. Exam reveals no gallop and no friction rub.    No murmur heard.  Pulmonary/Chest: Breath sounds normal. No respiratory distress. He has no wheezes. He has no rhonchi. He has no rales.   Musculoskeletal: Normal range of motion. He exhibits no edema or tenderness.   Neurological: He is alert and oriented to person, place, and time.   Skin: Skin is warm and dry. No rash noted. No pallor.   Minimal erythema along cuticle of right index finger, but no area of fluctuance or induration.         ED Course   Procedures  Labs Reviewed - No data to display             Additional MDM:   Comments: 42-year-old male presented for evaluation of pain to the cuticle of his right index finger.  Patient was concerned he may have an infection.  On exam there is no evidence of paronychia or other infection involving his right finger.  The patient was reassured and discharged home in stable condition..          Scribe Attestation:   Scribe #1: I performed the above scribed service and the documentation accurately describes the services I performed. I attest to the accuracy of the note.    Attending Attestation:           Physician Attestation for Scribe:  Physician Attestation Statement for Scribe #1: I, Dr. Ogden, reviewed documentation, as scribed by Aide Pretty in my presence, and it is both  accurate and complete.                    Clinical Impression:     1. Finger pain, right                               Angelica Ogden MD  03/11/18 0672

## 2018-03-11 NOTE — ED TRIAGE NOTES
"Pt complaining of pain to cuticle. Also mentioned swelling to LLE, but stated "I am just here for the finger".  "

## 2018-03-21 ENCOUNTER — HOSPITAL ENCOUNTER (INPATIENT)
Facility: HOSPITAL | Age: 43
LOS: 2 days | Discharge: HOME | DRG: 603 | End: 2018-03-23
Attending: EMERGENCY MEDICINE | Admitting: EMERGENCY MEDICINE
Payer: MEDICAID

## 2018-03-21 DIAGNOSIS — L03.116 CELLULITIS OF LEFT LEG: ICD-10-CM

## 2018-03-21 DIAGNOSIS — L03.119 CELLULITIS OF LOWER EXTREMITY, UNSPECIFIED LATERALITY: Primary | ICD-10-CM

## 2018-03-21 PROBLEM — M79.645 FINGER PAIN, LEFT: Status: RESOLVED | Noted: 2017-03-21 | Resolved: 2018-03-21

## 2018-03-21 LAB
ALBUMIN SERPL BCP-MCNC: 3.6 G/DL
ALP SERPL-CCNC: 133 U/L
ALT SERPL W/O P-5'-P-CCNC: 16 U/L
ANION GAP SERPL CALC-SCNC: 7 MMOL/L
AST SERPL-CCNC: 20 U/L
BASOPHILS # BLD AUTO: 0.04 K/UL
BASOPHILS NFR BLD: 0.8 %
BILIRUB SERPL-MCNC: 0.4 MG/DL
BNP SERPL-MCNC: <10 PG/ML
BUN SERPL-MCNC: 14 MG/DL
CALCIUM SERPL-MCNC: 9.4 MG/DL
CHLORIDE SERPL-SCNC: 104 MMOL/L
CO2 SERPL-SCNC: 28 MMOL/L
CREAT SERPL-MCNC: 1.5 MG/DL
DIFFERENTIAL METHOD: ABNORMAL
EOSINOPHIL # BLD AUTO: 0.2 K/UL
EOSINOPHIL NFR BLD: 4.5 %
ERYTHROCYTE [DISTWIDTH] IN BLOOD BY AUTOMATED COUNT: 18.1 %
EST. GFR  (AFRICAN AMERICAN): >60 ML/MIN/1.73 M^2
EST. GFR  (NON AFRICAN AMERICAN): 55.8 ML/MIN/1.73 M^2
GLUCOSE SERPL-MCNC: 95 MG/DL
HCT VFR BLD AUTO: 28.9 %
HGB BLD-MCNC: 8.1 G/DL
IMM GRANULOCYTES # BLD AUTO: 0.01 K/UL
IMM GRANULOCYTES NFR BLD AUTO: 0.2 %
LACTATE SERPL-SCNC: 0.7 MMOL/L
LYMPHOCYTES # BLD AUTO: 1 K/UL
LYMPHOCYTES NFR BLD: 20.1 %
MCH RBC QN AUTO: 20.3 PG
MCHC RBC AUTO-ENTMCNC: 28 G/DL
MCV RBC AUTO: 72 FL
MONOCYTES # BLD AUTO: 0.4 K/UL
MONOCYTES NFR BLD: 7.5 %
NEUTROPHILS # BLD AUTO: 3.3 K/UL
NEUTROPHILS NFR BLD: 66.9 %
NRBC BLD-RTO: 0 /100 WBC
PLATELET # BLD AUTO: 230 K/UL
PMV BLD AUTO: 10.5 FL
POTASSIUM SERPL-SCNC: 4.1 MMOL/L
PROT SERPL-MCNC: 6.9 G/DL
RBC # BLD AUTO: 3.99 M/UL
SODIUM SERPL-SCNC: 139 MMOL/L
WBC # BLD AUTO: 4.92 K/UL

## 2018-03-21 PROCEDURE — 99223 1ST HOSP IP/OBS HIGH 75: CPT | Mod: ,,, | Performed by: HOSPITALIST

## 2018-03-21 PROCEDURE — 25000003 PHARM REV CODE 250: Performed by: PHYSICIAN ASSISTANT

## 2018-03-21 PROCEDURE — 83880 ASSAY OF NATRIURETIC PEPTIDE: CPT

## 2018-03-21 PROCEDURE — 96375 TX/PRO/DX INJ NEW DRUG ADDON: CPT

## 2018-03-21 PROCEDURE — 96361 HYDRATE IV INFUSION ADD-ON: CPT

## 2018-03-21 PROCEDURE — 25000003 PHARM REV CODE 250: Performed by: HOSPITALIST

## 2018-03-21 PROCEDURE — S0077 INJECTION, CLINDAMYCIN PHOSP: HCPCS | Performed by: PHYSICIAN ASSISTANT

## 2018-03-21 PROCEDURE — 11000001 HC ACUTE MED/SURG PRIVATE ROOM

## 2018-03-21 PROCEDURE — 63600175 PHARM REV CODE 636 W HCPCS: Performed by: PHYSICIAN ASSISTANT

## 2018-03-21 PROCEDURE — 80053 COMPREHEN METABOLIC PANEL: CPT

## 2018-03-21 PROCEDURE — 87040 BLOOD CULTURE FOR BACTERIA: CPT | Mod: 59

## 2018-03-21 PROCEDURE — 83605 ASSAY OF LACTIC ACID: CPT

## 2018-03-21 PROCEDURE — 96365 THER/PROPH/DIAG IV INF INIT: CPT

## 2018-03-21 PROCEDURE — 99283 EMERGENCY DEPT VISIT LOW MDM: CPT | Mod: ,,, | Performed by: PHYSICIAN ASSISTANT

## 2018-03-21 PROCEDURE — 99284 EMERGENCY DEPT VISIT MOD MDM: CPT | Mod: 25

## 2018-03-21 PROCEDURE — 85025 COMPLETE CBC W/AUTO DIFF WBC: CPT

## 2018-03-21 PROCEDURE — 87040 BLOOD CULTURE FOR BACTERIA: CPT

## 2018-03-21 RX ORDER — ALLOPURINOL 100 MG/1
100 TABLET ORAL 3 TIMES DAILY
Status: DISCONTINUED | OUTPATIENT
Start: 2018-03-22 | End: 2018-03-23 | Stop reason: HOSPADM

## 2018-03-21 RX ORDER — PROCHLORPERAZINE EDISYLATE 5 MG/ML
10 INJECTION INTRAMUSCULAR; INTRAVENOUS
Status: COMPLETED | OUTPATIENT
Start: 2018-03-21 | End: 2018-03-21

## 2018-03-21 RX ORDER — ACETAMINOPHEN 325 MG/1
650 TABLET ORAL EVERY 8 HOURS PRN
Status: DISCONTINUED | OUTPATIENT
Start: 2018-03-21 | End: 2018-03-23 | Stop reason: HOSPADM

## 2018-03-21 RX ORDER — ONDANSETRON 2 MG/ML
8 INJECTION INTRAMUSCULAR; INTRAVENOUS EVERY 8 HOURS PRN
Status: DISCONTINUED | OUTPATIENT
Start: 2018-03-21 | End: 2018-03-23 | Stop reason: HOSPADM

## 2018-03-21 RX ORDER — RAMELTEON 8 MG/1
8 TABLET ORAL NIGHTLY PRN
Status: DISCONTINUED | OUTPATIENT
Start: 2018-03-21 | End: 2018-03-23 | Stop reason: HOSPADM

## 2018-03-21 RX ORDER — CLINDAMYCIN PHOSPHATE 600 MG/50ML
600 INJECTION, SOLUTION INTRAVENOUS
Status: COMPLETED | OUTPATIENT
Start: 2018-03-21 | End: 2018-03-21

## 2018-03-21 RX ORDER — IBUPROFEN 200 MG
24 TABLET ORAL
Status: DISCONTINUED | OUTPATIENT
Start: 2018-03-21 | End: 2018-03-23 | Stop reason: HOSPADM

## 2018-03-21 RX ORDER — GABAPENTIN 300 MG/1
300 CAPSULE ORAL 3 TIMES DAILY
Status: DISCONTINUED | OUTPATIENT
Start: 2018-03-22 | End: 2018-03-23 | Stop reason: HOSPADM

## 2018-03-21 RX ORDER — IBUPROFEN 200 MG
16 TABLET ORAL
Status: DISCONTINUED | OUTPATIENT
Start: 2018-03-21 | End: 2018-03-23 | Stop reason: HOSPADM

## 2018-03-21 RX ORDER — METOPROLOL TARTRATE 50 MG/1
50 TABLET ORAL 2 TIMES DAILY
Status: DISCONTINUED | OUTPATIENT
Start: 2018-03-21 | End: 2018-03-23 | Stop reason: HOSPADM

## 2018-03-21 RX ORDER — ONDANSETRON 4 MG/1
8 TABLET, ORALLY DISINTEGRATING ORAL EVERY 8 HOURS PRN
Status: DISCONTINUED | OUTPATIENT
Start: 2018-03-21 | End: 2018-03-23 | Stop reason: HOSPADM

## 2018-03-21 RX ORDER — PANTOPRAZOLE SODIUM 20 MG/1
20 TABLET, DELAYED RELEASE ORAL 2 TIMES DAILY
Status: DISCONTINUED | OUTPATIENT
Start: 2018-03-21 | End: 2018-03-23 | Stop reason: HOSPADM

## 2018-03-21 RX ORDER — SODIUM CHLORIDE 0.9 % (FLUSH) 0.9 %
5 SYRINGE (ML) INJECTION
Status: DISCONTINUED | OUTPATIENT
Start: 2018-03-21 | End: 2018-03-23 | Stop reason: HOSPADM

## 2018-03-21 RX ORDER — CLINDAMYCIN PHOSPHATE 600 MG/50ML
600 INJECTION, SOLUTION INTRAVENOUS
Status: DISCONTINUED | OUTPATIENT
Start: 2018-03-22 | End: 2018-03-23

## 2018-03-21 RX ORDER — GLUCAGON 1 MG
1 KIT INJECTION
Status: DISCONTINUED | OUTPATIENT
Start: 2018-03-21 | End: 2018-03-23 | Stop reason: HOSPADM

## 2018-03-21 RX ADMIN — PANTOPRAZOLE SODIUM 20 MG: 20 TABLET, DELAYED RELEASE ORAL at 11:03

## 2018-03-21 RX ADMIN — SODIUM CHLORIDE 1000 ML: 0.9 INJECTION, SOLUTION INTRAVENOUS at 05:03

## 2018-03-21 RX ADMIN — PROCHLORPERAZINE EDISYLATE 10 MG: 5 INJECTION INTRAMUSCULAR; INTRAVENOUS at 05:03

## 2018-03-21 RX ADMIN — SODIUM CHLORIDE 1000 ML: 0.9 INJECTION, SOLUTION INTRAVENOUS at 07:03

## 2018-03-21 RX ADMIN — CLINDAMYCIN IN 5 PERCENT DEXTROSE 600 MG: 12 INJECTION, SOLUTION INTRAVENOUS at 05:03

## 2018-03-21 RX ADMIN — ACETAMINOPHEN 650 MG: 325 TABLET ORAL at 11:03

## 2018-03-21 RX ADMIN — METOPROLOL TARTRATE 50 MG: 50 TABLET ORAL at 11:03

## 2018-03-21 NOTE — ED PROVIDER NOTES
Encounter Date: 3/21/2018       History     Chief Complaint   Patient presents with    Knee Pain     Pt c/o left knee pain & swelling.  States his legs are swollen which is not unusual.      Patient is a 43 year old male with PMHx of HTN, GERD, arthritis, and gout. He presents to the ED for left lower extremity pain. Patient was seen on 03/08/18 for similar complaint and discharged home on Keflex. He reports having left anterior shin and knee pain. Describes the pain as constant and sharp. Rates pain 9/10. Reports associated fever 100.5, chills, nausea, vomiting, erythema, warmth to the touch, and swelling. Reports increased pain with weight bearing. Denies recent falls or trauma. He denies sob, chest pain, abd pain, dysuria, diarrhea, or constipation. He is a non smoker and denies alcohol use.     According to our records, patient underwent venous ultrasound bilaterally on 03/08/18 found to have No evidence of deep venous thrombosis bilaterally.           Review of patient's allergies indicates:   Allergen Reactions    Nsaids (non-steroidal anti-inflammatory drug)      Hx of GI bleed     Past Medical History:   Diagnosis Date    Arthritis 10/11/2013    Blood transfusion     Cellulitis     GERD (gastroesophageal reflux disease)     GI bleed     Gout attack     Hypertension     Neuropathy     Osteomyelitis      Past Surgical History:   Procedure Laterality Date    CYST REMOVAL      INCISION AND DRAINAGE OF WOUND      left hand third finger    SURERY ON LEFT MIDDLE FINGER      OSTEOMYLITIS     Family History   Problem Relation Age of Onset    Heart disease Mother     Diabetes Mother     Kidney disease Mother     Hypertension Mother     Stroke Mother     Cancer Mother      Social History   Substance Use Topics    Smoking status: Never Smoker    Smokeless tobacco: Never Used    Alcohol use No     Review of Systems   Constitutional: Positive for chills and fever.   HENT: Negative for sore throat.     Respiratory: Negative for shortness of breath.    Cardiovascular: Negative for chest pain.   Gastrointestinal: Positive for nausea and vomiting. Negative for abdominal pain.   Genitourinary: Negative for dysuria.   Musculoskeletal: Positive for arthralgias (left knee pain). Negative for back pain.   Skin: Positive for color change (erythema of lower extremities). Negative for rash.   Neurological: Negative for weakness.   Hematological: Does not bruise/bleed easily.       Physical Exam     Initial Vitals [03/21/18 1426]   BP Pulse Resp Temp SpO2   135/80 (!) 114 16 98.4 °F (36.9 °C) 100 %      MAP       98.33         Physical Exam    Vitals reviewed.  Constitutional: He appears well-developed and well-nourished.   Patient resting comfortably in NAD on RA.    HENT:   Head: Normocephalic and atraumatic.   Nose: Nose normal.   Eyes: Conjunctivae and EOM are normal. Pupils are equal, round, and reactive to light.   Neck: Normal range of motion.   Cardiovascular: Normal rate and regular rhythm. Exam reveals no friction rub.    No murmur heard.  Pulmonary/Chest: Breath sounds normal. No respiratory distress. He has no wheezes. He has no rales.   Abdominal: Soft. Bowel sounds are normal. He exhibits no distension. There is no tenderness.   Musculoskeletal: Normal range of motion. He exhibits edema.        Left knee: He exhibits normal range of motion and no swelling.   3+ pitting edema extending from pedal and tibia of b/l lower extremities with erythema and warmth to the touch.    Neurological: He is alert and oriented to person, place, and time. He has normal strength. No sensory deficit.   Skin: Skin is warm and dry. There is erythema (over b/l anterior lower extremities).   Psychiatric: He has a normal mood and affect. Thought content normal.         ED Course   Procedures  Labs Reviewed   CBC W/ AUTO DIFFERENTIAL - Abnormal; Notable for the following:        Result Value    RBC 3.99 (*)     Hemoglobin 8.1 (*)      Hematocrit 28.9 (*)     MCV 72 (*)     MCH 20.3 (*)     MCHC 28.0 (*)     RDW 18.1 (*)     All other components within normal limits   COMPREHENSIVE METABOLIC PANEL - Abnormal; Notable for the following:     Creatinine 1.5 (*)     Anion Gap 7 (*)     eGFR if non  55.8 (*)     All other components within normal limits   CULTURE, BLOOD   CULTURE, BLOOD   LACTIC ACID, PLASMA   B-TYPE NATRIURETIC PEPTIDE   B-TYPE NATRIURETIC PEPTIDE    Narrative:     ADD ON BNP PER CARIDAD CAMERON PA-C AT  03/21/2018  17:09 (CBC)                   APC / Resident Notes:   Patient is a 43 year old male with PMHx of HTN, GERD, arthritis, and gout. He presents to the ED for left lower extremity pain.    Will order labs and IVF. Will treat with clindamycin while in ED. Will continue to monitor.     Differential diagnoses include, but are not limited to: cellulitis, gout, lymphedema, or chronic venous insufficiency. I considered but do not suspect DVT.     No leukocytosis.  Elevated Cr 1.5.  Lactate WNL. BNP WNL. Blood cultures pending.   Will admit to medicine.     I have discussed and reviewed with my supervising physician.             Attending Attestation:     Physician Attestation Statement for NP/PA:   I discussed this assessment and plan of this patient with the NP/PA, but I did not personally examine the patient. The face to face encounter was performed by the NP/PA.                     Clinical Impression:   The encounter diagnosis was Cellulitis of lower extremity, unspecified laterality.    Disposition:   Disposition: Admitted  Condition: Stable                        Caridad Cameron PA-C  03/21/18 2007       Cristian Logan MD  03/24/18 105

## 2018-03-21 NOTE — ED NOTES
"Pt c/o "left leg red, painful and swollen for 4-5 days, had cellulitis before and was told if ever happen again to come to ER".     LOC: The patient is awake, alert and aware of environment with an appropriate affect, the patient is oriented x 3 and speaking appropriately.  APPEARANCE: Patient resting comfortably and in no acute distress, patient is clean and well groomed, patient's clothing is properly fastened.  SKIN: The skin is warm and dry, intact, patient has normal skin turgor and moist mucus membranes.   RESPIRATORY: Airway is open and patent, respirations are spontaneous, patient has a normal effort and rate.  CARDIAC: Patient has a normal rate and rhythm, no periphreal edema noted, capillary refill < 3 seconds. Bilateral radial pulses +2  ABDOMEN: Soft and non tender to palpation, no distention noted. Bowel sounds present  NEUROLOGIC: PERRL, facial expression is symmetrical, patient moving all extremities spontaneously, normal sensation in all extremities when touched with a finger. Follows all commands appropriately  MUSCULOSKELETAL: No obvious deformities. Full ROM in all 4 extremities. + redness and swelling to lle  "

## 2018-03-22 LAB
ALBUMIN SERPL BCP-MCNC: 2.7 G/DL
ALP SERPL-CCNC: 104 U/L
ALT SERPL W/O P-5'-P-CCNC: 11 U/L
ANION GAP SERPL CALC-SCNC: 7 MMOL/L
AST SERPL-CCNC: 19 U/L
BASOPHILS # BLD AUTO: 0.02 K/UL
BASOPHILS NFR BLD: 0.6 %
BILIRUB SERPL-MCNC: 0.2 MG/DL
BUN SERPL-MCNC: 12 MG/DL
CALCIUM SERPL-MCNC: 8.3 MG/DL
CHLORIDE SERPL-SCNC: 110 MMOL/L
CO2 SERPL-SCNC: 22 MMOL/L
CREAT SERPL-MCNC: 1.4 MG/DL
DIFFERENTIAL METHOD: ABNORMAL
EOSINOPHIL # BLD AUTO: 0.2 K/UL
EOSINOPHIL NFR BLD: 6.2 %
ERYTHROCYTE [DISTWIDTH] IN BLOOD BY AUTOMATED COUNT: 18.5 %
EST. GFR  (AFRICAN AMERICAN): >60 ML/MIN/1.73 M^2
EST. GFR  (NON AFRICAN AMERICAN): >60 ML/MIN/1.73 M^2
GLUCOSE SERPL-MCNC: 83 MG/DL
HCT VFR BLD AUTO: 26.1 %
HGB BLD-MCNC: 7.3 G/DL
IMM GRANULOCYTES # BLD AUTO: 0.01 K/UL
IMM GRANULOCYTES NFR BLD AUTO: 0.3 %
LYMPHOCYTES # BLD AUTO: 1.4 K/UL
LYMPHOCYTES NFR BLD: 44.5 %
MAGNESIUM SERPL-MCNC: 1.9 MG/DL
MCH RBC QN AUTO: 20.4 PG
MCHC RBC AUTO-ENTMCNC: 28 G/DL
MCV RBC AUTO: 73 FL
MONOCYTES # BLD AUTO: 0.4 K/UL
MONOCYTES NFR BLD: 12.7 %
NEUTROPHILS # BLD AUTO: 1.1 K/UL
NEUTROPHILS NFR BLD: 35.7 %
NRBC BLD-RTO: 0 /100 WBC
PHOSPHATE SERPL-MCNC: 3.7 MG/DL
PLATELET # BLD AUTO: 115 K/UL
PMV BLD AUTO: ABNORMAL FL
POTASSIUM SERPL-SCNC: 4.7 MMOL/L
PROT SERPL-MCNC: 5.4 G/DL
RBC # BLD AUTO: 3.57 M/UL
SODIUM SERPL-SCNC: 139 MMOL/L
WBC # BLD AUTO: 3.08 K/UL

## 2018-03-22 PROCEDURE — 84100 ASSAY OF PHOSPHORUS: CPT

## 2018-03-22 PROCEDURE — 99232 SBSQ HOSP IP/OBS MODERATE 35: CPT | Mod: ,,, | Performed by: HOSPITALIST

## 2018-03-22 PROCEDURE — 97530 THERAPEUTIC ACTIVITIES: CPT

## 2018-03-22 PROCEDURE — 25000003 PHARM REV CODE 250: Performed by: HOSPITALIST

## 2018-03-22 PROCEDURE — 83735 ASSAY OF MAGNESIUM: CPT

## 2018-03-22 PROCEDURE — 97535 SELF CARE MNGMENT TRAINING: CPT

## 2018-03-22 PROCEDURE — 97161 PT EVAL LOW COMPLEX 20 MIN: CPT

## 2018-03-22 PROCEDURE — 11000001 HC ACUTE MED/SURG PRIVATE ROOM

## 2018-03-22 PROCEDURE — 36415 COLL VENOUS BLD VENIPUNCTURE: CPT

## 2018-03-22 PROCEDURE — 80053 COMPREHEN METABOLIC PANEL: CPT

## 2018-03-22 PROCEDURE — S0077 INJECTION, CLINDAMYCIN PHOSP: HCPCS | Performed by: HOSPITALIST

## 2018-03-22 PROCEDURE — 85025 COMPLETE CBC W/AUTO DIFF WBC: CPT

## 2018-03-22 PROCEDURE — 97165 OT EVAL LOW COMPLEX 30 MIN: CPT

## 2018-03-22 RX ORDER — HYDROCODONE BITARTRATE AND ACETAMINOPHEN 5; 325 MG/1; MG/1
1 TABLET ORAL EVERY 4 HOURS PRN
Status: DISCONTINUED | OUTPATIENT
Start: 2018-03-22 | End: 2018-03-23 | Stop reason: HOSPADM

## 2018-03-22 RX ADMIN — GABAPENTIN 300 MG: 300 CAPSULE ORAL at 09:03

## 2018-03-22 RX ADMIN — HYDROCODONE BITARTRATE AND ACETAMINOPHEN 1 TABLET: 5; 325 TABLET ORAL at 06:03

## 2018-03-22 RX ADMIN — ALLOPURINOL 100 MG: 100 TABLET ORAL at 02:03

## 2018-03-22 RX ADMIN — GABAPENTIN 300 MG: 300 CAPSULE ORAL at 10:03

## 2018-03-22 RX ADMIN — ALLOPURINOL 100 MG: 100 TABLET ORAL at 10:03

## 2018-03-22 RX ADMIN — ALLOPURINOL 100 MG: 100 TABLET ORAL at 09:03

## 2018-03-22 RX ADMIN — METOPROLOL TARTRATE 50 MG: 50 TABLET ORAL at 10:03

## 2018-03-22 RX ADMIN — GABAPENTIN 300 MG: 300 CAPSULE ORAL at 02:03

## 2018-03-22 RX ADMIN — HYDROCODONE BITARTRATE AND ACETAMINOPHEN 1 TABLET: 5; 325 TABLET ORAL at 10:03

## 2018-03-22 RX ADMIN — CLINDAMYCIN IN 5 PERCENT DEXTROSE 600 MG: 12 INJECTION, SOLUTION INTRAVENOUS at 02:03

## 2018-03-22 RX ADMIN — ACETAMINOPHEN 650 MG: 325 TABLET ORAL at 02:03

## 2018-03-22 RX ADMIN — PANTOPRAZOLE SODIUM 20 MG: 20 TABLET, DELAYED RELEASE ORAL at 09:03

## 2018-03-22 RX ADMIN — CLINDAMYCIN IN 5 PERCENT DEXTROSE 600 MG: 12 INJECTION, SOLUTION INTRAVENOUS at 10:03

## 2018-03-22 RX ADMIN — CLINDAMYCIN IN 5 PERCENT DEXTROSE 600 MG: 12 INJECTION, SOLUTION INTRAVENOUS at 06:03

## 2018-03-22 RX ADMIN — METOPROLOL TARTRATE 50 MG: 50 TABLET ORAL at 09:03

## 2018-03-22 RX ADMIN — PANTOPRAZOLE SODIUM 20 MG: 20 TABLET, DELAYED RELEASE ORAL at 10:03

## 2018-03-22 NOTE — PLAN OF CARE
Problem: Physical Therapy Goal  Goal: Physical Therapy Goal  Goals to be met by: 18     Patient will increase functional independence with mobility by performin. Sit to stand transfer with [place]  2. Gait  x 200 feet with [place]   3. Ascend/descend 10 stair with right Handrails Supervision demonstrating proper sequencing to minimize knee pain    Outcome: Ongoing (interventions implemented as appropriate)  PT Goals established following initial shane Swanson, PT  3/22/2018

## 2018-03-22 NOTE — PT/OT/SLP EVAL
Physical Therapy Evaluation    Patient Name:  Stu Mitchell   MRN:  [id number]    Recommendations:     Discharge Recommendations:  outpatient PT   Discharge Equipment Recommendations: none   Barriers to discharge: None    Assessment:     Stu Mitchell is a 43 y.o. male admitted with a medical diagnosis of Cellulitis of lower extremity.  He presents with the following impairments/functional limitations:  weakness, impaired endurance, gait instability, decreased ROM. Patient was independent prior to admission. Patient demonstrates good bed mobility and transfer skills, but requires close SBA during out of bed activity. Light gait instability may likely be due to fatigue. Patient presents w/ poor standing posture. Patient was receptive of education on stair training. Patient may benefit from OPPT followup to address postural deficits.     Rehab Prognosis: Good; patient would benefit from acute skilled PT services to address these deficits and reach maximum level of function.      Recent Surgery: * No surgery found *      Plan:     During this hospitalization, patient to be seen 2 x/week to address the above listed problems via gait training, therapeutic activities, therapeutic exercises, neuromuscular re-education  · Plan of Care Expires:  04/21/18   Plan of Care Reviewed with: patient    Subjective     Communicated with nurse prior to session.  Patient found R sidelying upon PT entry to room, agreeable to evaluation.      Chief Complaint: poor posture, gait instability   Patient comments/goals: to get better   Pain/Comfort:  · Pain Rating 1:  (c/o L knee pain, but not rated)    Patients cultural, spiritual, Mormon conflicts given the current situation: none stated    Living Environment:  Patient lives alone in an apartment w/ 10 stairs to enter, RHR. Tub shower   Prior to admission, patients level of function was Independent.  Patient has the following equipment: none.  DME owned (not currently used):  rolling walker and wheelchair.  Upon discharge, patient will have assistance from (none stated).    Objective:     Patient found with:  (all lines intact)     General Precautions: Standard, fall   Orthopedic Precautions:    Braces:       Exams:  · Cognitive Exam:  Patient is oriented to Person, Place, Time and Situation and follows 100% of all commands   · Postural Exam:  Patient presented with the following abnormalities: -       Rounded shoulders  · -       Forward head  · -       Kyphosis  · RLE ROM: WFL  · RLE Strength: WFL  · LLE ROM: WFL  · LLE Strength: WFL    Functional Mobility:  · Bed Mobility:     · Supine to Sit: supervision  · Sit to Supine: supervision  · Transfers:  Sit to Stand:  stand by assistance with no AD  · Gait: 100'  · SBA, no AD-decreased step width w/ slight LOB  · Stairs:  Pt ascended/descended 10 stair(s) with No Assistive Device with right handrail with Stand-by Assistance.     AM-PAC 6 CLICK MOBILITY  Total Score:22       Therapeutic Activities and Exercises:   PT Eval completed  Patient educated on stair training for correct sequencing to minimize pain in L knee    Patient left sitting EOB with all lines intact, call button in reach and nurse notified.    GOALS:    Physical Therapy Goals        Problem: Physical Therapy Goal    Goal Priority Disciplines Outcome Goal Variances Interventions   Physical Therapy Goal     PT/OT, PT Ongoing (interventions implemented as appropriate)     Description:  Goals to be met by: 18     Patient will increase functional independence with mobility by performin. Sit to stand transfer with [place]  2. Gait  x 200 feet with [place]   3. Ascend/descend 10 stair with right Handrails Supervision demonstrating proper sequencing to minimize knee pain                      History:     Past Medical History:   Diagnosis Date    Arthritis 10/11/2013    Blood transfusion     Cellulitis     GERD (gastroesophageal reflux disease)     GI  bleed     Gout attack     Hypertension     Neuropathy     Osteomyelitis        Past Surgical History:   Procedure Laterality Date    CYST REMOVAL      INCISION AND DRAINAGE OF WOUND      left hand third finger    SURERY ON LEFT MIDDLE FINGER      OSTEOMYLITIS       Clinical Decision Making:     History  Co-morbidities and personal factors that may impact the plan of care Examination  Body Structures and Functions, activity limitations and participation restrictions that may impact the plan of care Clinical Presentation   Decision Making/ Complexity Score   Co-morbidities:   [] Time since onset of injury / illness / exacerbation  [] Status of current condition  []Patient's cognitive status and safety concerns    [] Multiple Medical Problems (see med hx)  Personal Factors:   [] Patient's age  [] Prior Level of function   [] Patient's home situation (environment and family support)  [] Patient's level of motivation  [] Expected progression of patient      HISTORY:(criteria)    [] 82554 - no personal factors/history    [] 45677 - has 1-2 personal factor/comorbidity     [] 21702 - has >3 personal factor/comorbidity     Body Regions:  [] Objective examination findings  [] Head     []  Neck  [] Trunk   [] Upper Extremity  [] Lower Extremity    Body Systems:  [] For communication ability, affect, cognition, language, and learning style: the assessment of the ability to make needs known, consciousness, orientation (person, place, and time), expected emotional /behavioral responses, and learning preferences (eg, learning barriers, education  needs)  [] For the neuromuscular system: a general assessment of gross coordinated movement (eg, balance, gait, locomotion, transfers, and transitions) and motor function  (motor control and motor learning)  [] For the musculoskeletal system: the assessment of gross symmetry, gross range of motion, gross strength, height, and weight  [] For the integumentary system: the assessment  of pliability(texture), presence of scar formation, skin color, and skin integrity  [] For cardiovascular/pulmonary system: the assessment of heart rate, respiratory rate, blood pressure, and edema     Activity limitations:    [] Patient's cognitive status and saf ety concerns          [] Status of current condition      [] Weight bearing restriction  [] Cardiopulmunary Restriction    Participation Restrictions:   [] Goals and goal agreement with the patient     [] Rehab potential (prognosis) and probable outcome      Examination of Body System: (criteria)    [] 16272 - addressing 1-2 elements    [] 96443 - addressing a total of 3 or more elements     [] 43647 -  Addressing a total of 4 or more elements         Clinical Presentation: (criteria)  Stable - 73141     On examination of body system using standardized tests and measures patient presents with 1-2 elements from any of the following: body structures and functions, activity limitations, and/or participation restrictions.  Leading to a clinical presentation that is considered stable and/or uncomplicated                              Clinical Decision Making  (Eval Complexity):  Low- 88504     Time Tracking:     PT Received On: 03/22/18  PT Start Time: 0853     PT Stop Time: 0911  PT Total Time (min): 18 min     Billable Minutes: Evaluation 10 min and Therapeutic Activity 8 Min      Clemente Swanson, PT  03/22/2018

## 2018-03-22 NOTE — PLAN OF CARE
Problem: Occupational Therapy Goal  Goal: Occupational Therapy Goal  Outcome: Outcome(s) achieved Date Met: 03/22/18  Pt has no OT needs. OT to d/c.    SHELBY Julian  3/22/2018  Pager: [phone]

## 2018-03-22 NOTE — PT/OT/SLP EVAL
Occupational Therapy   Evaluation, Treatment and Discharge Note    Name: Stu Mitchell  MRN: [id number]  Admitting Diagnosis:  Cellulitis of lower extremity      Recommendations:     Discharge Recommendations: outpatient PT  Discharge Equipment Recommendations:  none  Barriers to discharge:  None    History:     Occupational Profile:  Living Environment: Pt lives alone in an apartment accessible via 10 stairs with R rail. 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  Roles and Routines: worker, friend  Equipment Owned:  none  Assistance upon Discharge: not available    Past Medical History:   Diagnosis Date    Arthritis 10/11/2013    Blood transfusion     Cellulitis     GERD (gastroesophageal reflux disease)     GI bleed     Gout attack     Hypertension     Neuropathy     Osteomyelitis        Past Surgical History:   Procedure Laterality Date    CYST REMOVAL      INCISION AND DRAINAGE OF WOUND      left hand third finger    SURERY ON LEFT MIDDLE FINGER      OSTEOMYLITIS       Subjective     Chief Complaint: L knee pain  Patient/Family stated goals: get better and go home  Communicated with: RN prior to session.  Pain/Comfort:  · Pain Rating 1:  (did not rate)  · Location - Side 1: Left  · Location 1:  (knee)  · Pain Addressed 1: Nurse notified, Cessation of Activity  · Pain Rating Post-Intervention 1:  (did not rate)    Patients cultural, spiritual, Rastafarian conflicts given the current situation: none stated    Objective:     Patient found with:  (none)    General Precautions: Standard, fall   Orthopedic Precautions:N/A   Braces: N/A     Occupational Performance:    Bed Mobility:    · Patient completed Supine to Sit with independence    Functional Mobility/Transfers:  · Patient completed Sit <> Stand Transfer with supervision  with  no assistive device   · Functional Mobility: Supervision with no  "AD while at [facility]; Will be (I) at d/c and at home.    Activities of Daily Living:  · UB Dressing: independence tshirt  · LB Dressing: independence underwear/shorts, pants, socks  · Toileting: independence urinated in stance at toilet    Cognitive/Visual Perceptual:  Cognitive/Psychosocial Skills:     -       Oriented to: Person, Place, Time and Situation   -       Follows Commands/attention:Follows multistep  commands  -       Communication: clear/fluent  -       Memory: No Deficits noted  -       Safety awareness/insight to disability: intact   -       Mood/Affect/Coping skills/emotional control: Appropriate to situation  Visual/Perceptual:      -Intact    Physical Exam:  Balance:    -       Good  Postural examination/scapula alignment:    -       Rounded shoulders  -       Forward head  Dominant hand:    -       R  Upper Extremity Range of Motion:     -       Right Upper Extremity: WFL  -       Left Upper Extremity: WFL  Upper Extremity Strength:    -       Right Upper Extremity: WFL  -       Left Upper Extremity: WFL   Strength:    -       Right Upper Extremity: WFL  -       Left Upper Extremity: WFL  Fine Motor Coordination:    -       Intact  Gross motor coordination:   WFL    Patient left sitting EOB with call button in reach    [facility] 6 Click:  [facility] Total Score: 24    Treatment & Education:  Pt ed re OT role and POC. Pt is (I) in all ADLs. Pt is safe with mobility. Pt ed re safety and required verbal cues for safety with urinating in stance 2/2 sway noted in static standing at toilet; however pt with no significant LOB.  Education:    Assessment:     Stu Mitchell is a 43 y.o. male with a medical diagnosis of Cellulitis of lower extremity. At this time, patient is functioning at their prior level of function and does not require further acute OT services.     Clinical Decision Makin.  OT Low:  "Pt evaluation falls under low complexity for evaluation coding due to performance deficits noted in " "1-3 areas as stated above and 0 co-morbities affecting current functional status. Data obtained from problem focused assessments. No modifications or assistance was required for completion of evaluation. Only brief occupational profile and history review completed."     Plan:     During this hospitalization, patient does not require further acute OT services.  Please re-consult if situation changes.    · Plan of Care Reviewed with: patient    This Plan of care has been discussed with the patient who was involved in its development and understands and is in agreement with the identified goals and treatment plan    GOALS:    Occupational Therapy Goals     Not on file          Multidisciplinary Problems (Resolved)        Problem: Occupational Therapy Goal    Goal Priority Disciplines Outcome Interventions   Occupational Therapy Goal   (Resolved)     OT, PT/OT Outcome(s) achieved                    Time Tracking:     OT Date of Treatment: 03/22/18  OT Start Time: 0852  OT Stop Time: 0908  OT Total Time (min): 16 min    Billable Minutes:Evaluation 8 minutes  Self Care/Home Management 8 minutes    SHELBY Julian  3/22/2018  Pager: [phone]    "

## 2018-03-22 NOTE — H&P
History and Physical  Hospital Medicine       Patient Name: Stu Mitchell  MRN:  [id number]  [place] [facility] [place] [facility] [place] Nuria Sen MD  Date of Admission:  3/21/2018     Principal Problem:  Cellulitis of lower extremity   Primary Care Physician: [facility] Rene Calabrese      History of Present Illness:     Mr. Stu Mitchell is a 43 y.o. male with HTN, gout and recurrent cellulitis who presents to the ED with LLE cellulitis and pain.  He was just seen in the ED on 3/8 for a similar complaint, and was discharged home on PO Keflex.  He returned to the ED with left anterior shin erythema and pain, that has caused him difficulty with ambulation.  He endorses fevers up to 100.4F and chills.  He denies any trauma to the leg.    In the ED, he was started on IV Clindamycin.  Ultrasound performed on 3/8 was negative for a DVT, so repeat wasn't needed.  He was admitted to Hospital Medicine for IV antibiotics for having failed outpatient therapy    Review of Systems   Constitutional: + chills, fever.   HENT: Negative for sore throat, trouble swallowing.    Eyes: Negative for photophobia, visual disturbance.   Respiratory: Negative for cough, shortness of breath.    Cardiovascular: Negative for chest pain, palpitations, leg swelling.   Gastrointestinal: Negative for abdominal pain, constipation, diarrhea, nausea, vomiting.   Endocrine: Negative for cold intolerance, heat intolerance.   Genitourinary: Negative for dysuria, frequency.   Musculoskeletal: Negative for arthralgias, myalgias.   Skin: + erythema   Neurological: Negative for dizziness, syncope, weakness, light-headedness.   Psychiatric/Behavioral: Negative for confusion, hallucinations, anxiety  All other systems reviewed and are negative.      Past Medical History: Patient has a past medical history of Arthritis (10/11/2013); Blood transfusion; Cellulitis; GERD (gastroesophageal reflux disease); GI bleed; Gout attack; Hypertension;  Neuropathy; and Osteomyelitis.    Past Surgical History: Patient has a past surgical history that includes Cyst Removal; Incision and drainage of wound; and SURERY ON LEFT MIDDLE FINGER.    Social History: Patient reports that he has never smoked. He has never used smokeless tobacco. He reports that he does not drink alcohol or use drugs.    Family History: family history includes Cancer in his mother; Diabetes in his mother; Heart disease in his mother; Hypertension in his mother; Kidney disease in his mother; Stroke in his mother.    Medications: Scheduled Meds:   [START ON 3/22/2018] clindamycin 600 MG/50 ML D5W  600 mg Intravenous Q8H    [START ON 3/22/2018] colchicine  0.3 mg Oral Daily    [START ON 3/22/2018] gabapentin  300 mg Oral TID    metoprolol tartrate  50 mg Oral BID    pantoprazole  20 mg Oral BID     Continuous Infusions:  PRN Meds:.acetaminophen, dextrose 50%, dextrose 50%, glucagon (human recombinant), glucose, glucose, ondansetron, ondansetron, ramelteon, sodium chloride 0.9%    Allergies: Patient is allergic to nsaids (non-steroidal anti-inflammatory drug).    Physical Exam:     Vital Signs (Most Recent):  Temp: 98.2 °F (36.8 °C) (03/21/18 2157)  Pulse: 91 (03/21/18 2157)  Resp: 16 (03/21/18 2157)  BP: (!) 142/73 (03/21/18 2157)  SpO2: 96 % (03/21/18 2157) Vital Signs Range (Last 24H):  Temp:  [98.2 °F (36.8 °C)-98.4 °F (36.9 °C)]   Pulse:  []   Resp:  [16]   BP: (135-142)/(73-80)   SpO2:  [96 %-100 %]    Body mass index is 28.65 kg/m².     Physical Exam:  Constitutional: Appears well-developed and well-nourished.   Head: Normocephalic and atraumatic.   Mouth/Throat: Oropharynx is clear and moist.   Eyes: EOM are normal. Pupils are equal, round, and reactive to light. No scleral icterus.   Neck: Normal range of motion. Neck supple.   Cardiovascular: Normal rate and regular rhythm.  No murmur heard.  Pulmonary/Chest: Effort normal and breath sounds normal. No respiratory distress. No  wheezes, rales, or rhonchi  Abdominal: Soft. Bowel sounds are normal.  No distension or tenderness  Musculoskeletal: Normal range of motion. No edema.   Neurological: Alert and oriented to person, place, and time. Occasional chills  Skin: Skin is warm and dry. LLE with mild erythema and edema and warmth up to mid shin  Psychiatric: Normal mood and affect. Behavior is normal.   Vitals reviewed.      Recent Labs  Lab 03/21/18  1600   WBC 4.92   HGB 8.1*   HCT 28.9*            Recent Labs  Lab 03/21/18  1600      K 4.1      CO2 28   BUN 14   CREATININE 1.5*   GLU 95   CALCIUM 9.4       Recent Labs  Lab 03/21/18  1600   ALKPHOS 133   ALT 16   AST 20   ALBUMIN 3.6   PROT 6.9   BILITOT 0.4      No results for input(s): POCTGLUCOSE in the last 168 hours.      Assessment and Plan:     Mr. Stu Mitchell is a 43 y.o. male who presented to [facility] on 3/21/2018 with LLE cellultiis    Active Hospital Problems    Diagnosis  POA    *Cellulitis of lower extremity [L03.119]  Yes    CKD (chronic kidney disease) stage 3, GFR 30-59 ml/min [N18.3]  Yes    Gout [M10.9]  Yes    Iron (Fe) deficiency anemia [D50.9]  Yes    Hypertension [I10]  Yes     Chronic    GERD (gastroesophageal reflux disease) [K21.9]  Yes     Chronic      Resolved Hospital Problems    Diagnosis Date Resolved POA   No resolved problems to display.     Cellulitis of Lower Extremity  · 1/4 SIRS criteria:  HR >90  · US 3/8 negative for DVT and cellulitic area marked  · Likely 2/2 beta hemolytic Strep.    · Continue Clinda IV as he failed outpatient therapy with PO Keflex  · Can potentially DC home on PO Clinda to complete a 5-10 day course  · PT/OT    CKD Stage 3  · Chronic and stable  · Monitor renal function  · Continue IVF    Gout  · Chronic and stable  · Continue Allopurinol 100mg PO TID    Iron Deficiency Anemia  · Chronic and stable  · Hgb 8, monitor    Hypertension  · Chronic and stable  · Continue Lopressor 50mg PO  BID    GERD  · Chronic and stable  · Continue Pantoprazole 40mg PO BID    Diet:  Regular  GI PPx:  PPI  DVT PPx:  Ambulating  Goals of Care:  Full    Disposition:  Pending improvement in cellulitis    Nuria Sen MD  [facility]  Cell:  [phone]  Spectra:  [phone]  Pager:  [phone]

## 2018-03-22 NOTE — PLAN OF CARE
[facility]  [address] / [place] [address]    Payor: MEDICAID / Plan: [place] [facility] CONNECT / Product Type: Managed Medicaid /        03/22/18 1212   Discharge Assessment   Assessment Type Discharge Planning Assessment   Confirmed/corrected address and phone number on facesheet? Yes   Assessment information obtained from? Patient   Prior to hospitilization cognitive status: Alert/Oriented   Prior to hospitalization functional status: Independent   Current cognitive status: Alert/Oriented   Current Functional Status: Independent   Lives With alone   Able to Return to Prior Arrangements yes   Is patient able to care for self after discharge? (patient's sister Yokasta provides assistance to the patient)   Who are your caregiver(s) and their phone number(s)? (Yokasta Mitchell  sister [phone])   Patient's perception of discharge disposition home or selfcare   Patient currently receives any other outside agency services? No   Equipment Currently Used at Home (patient states that he has his mother's rolling walker if needs it)   Does the patient have transportation home? Yes   Transportation Available family or friend will provide   Discharge Plan A Home   Discharge Plan B Home;Home Health  (skilled nurse only if needed)   Patient/Family In Agreement With Plan yes

## 2018-03-22 NOTE — PROGRESS NOTES
Progress Note   Hospital Medicine         Patient Name: Stu Mitchell  MRN:  [id number]  [facility] Medicine Team: [facility] [place] [facility] O JABARI Haider II  Date of Admission:  3/21/2018     Length of Stay:  LOS: 1 day   Expected Discharge Date:   Principal Problem:  Cellulitis of lower extremity       Subjective:     Interval History/Overnight Events:    NAEON VSSA. Admitted to -O overnight for cellulitis and IV abx having failed oral therapies. Tolerating Abx well. Cellulitis minimally changed from overnight based on markings and documented exam.     Review of Systems   Constitutional: Negative for chills, fatigue, fever.   HENT: Negative for sore throat, trouble swallowing.    Eyes: Negative for photophobia, visual disturbance.   Respiratory: Negative for cough, shortness of breath.    Cardiovascular: Negative for chest pain, palpitations, leg swelling.   Gastrointestinal: Negative for abdominal pain, constipation, diarrhea, nausea, vomiting.   Endocrine: Negative for cold intolerance, heat intolerance.   Genitourinary: Negative for dysuria, frequency.   Musculoskeletal: Negative for arthralgias, myalgias.   Skin: + erythema Negative for rash, wound  Neurological: Negative for dizziness, syncope, weakness, light-headedness.   Psychiatric/Behavioral: Negative for confusion, hallucinations, anxiety  All other systems reviewed and are negative.    Objective:     Temp:  [97.9 °F (36.6 °C)-98.4 °F (36.9 °C)]   Pulse:  []   Resp:  [12-18]   BP: (111-142)/(56-80)   SpO2:  [96 %-100 %]       Physical Exam:  Constitutional: Appears well-developed and well-nourished.   Head: Normocephalic and atraumatic.   Mouth/Throat: Oropharynx is clear and moist.   Eyes: EOM are normal. Pupils are equal, round, and reactive to light. No scleral icterus.   Neck: Normal range of motion. Neck supple.   Cardiovascular: Normal rate and regular rhythm.  No murmur heard.  Pulmonary/Chest: Effort normal and breath sounds normal. No  respiratory distress. No wheezes, rales, or rhonchi  Abdominal: Soft. Bowel sounds are normal.  No distension or tenderness  Musculoskeletal: Normal range of motion. No edema.   Neurological: Alert and oriented to person, place, and time. Occasional chills  Skin: Skin is warm and dry. LLE with mild erythema and edema and warmth up to mid shin  Psychiatric: Normal mood and affect. Behavior is normal.   Vitals reviewed.      Recent Labs  Lab 03/21/18  1600 03/22/18  0401   WBC 4.92 3.08*   HGB 8.1* 7.3*   HCT 28.9* 26.1*    115*       Recent Labs  Lab 03/21/18  1600 03/22/18  0401    139   K 4.1 4.7    110   CO2 28 22*   BUN 14 12   CREATININE 1.5* 1.4   GLU 95 83   CALCIUM 9.4 8.3*   MG  --  1.9   PHOS  --  3.7       Recent Labs  Lab 03/21/18  1600 03/22/18  0401   ALKPHOS 133 104   ALT 16 11   AST 20 19   ALBUMIN 3.6 2.7*   PROT 6.9 5.4*   BILITOT 0.4 0.2     No results for input(s): POCTGLUCOSE in the last 168 hours.     allopurinol  100 mg Oral TID    clindamycin 600 MG/50 ML D5W  600 mg Intravenous Q8H    gabapentin  300 mg Oral TID    metoprolol tartrate  50 mg Oral BID    pantoprazole  20 mg Oral BID       Assessment and Plan     Mr. Stu Mitchell is a 43 y.o. male who presented to [facility] on 3/21/2018 with     Hospital Course:    Mr. Stu Mitchell was admitted to Hospital Medicine for management of     Active Hospital Problems    Diagnosis  POA    *Cellulitis of lower extremity [L03.119]  Yes    CKD (chronic kidney disease) stage 3, GFR 30-59 ml/min [N18.3]  Yes    Gout [M10.9]  Yes    Iron (Fe) deficiency anemia [D50.9]  Yes    Hypertension [I10]  Yes     Chronic    GERD (gastroesophageal reflux disease) [K21.9]  Yes     Chronic      Resolved Hospital Problems    Diagnosis Date Resolved POA   No resolved problems to display.           Cellulitis of Lower Extremity  · 1/4 SIRS criteria:  HR >90  · US 3/8 negative for DVT and cellulitic area marked  · Likely 2/2 beta  hemolytic Strep.    · Continue Clinda IV as he failed outpatient therapy with PO Keflex  · Can potentially DC home on PO Clinda to complete a 5-10 day course  · PT/OT     CKD Stage 3  · Chronic and stable  · Monitor renal function  · Continue IVF     Gout  · Chronic and stable  · Continue Allopurinol 100mg PO TID     Iron Deficiency Anemia  · Hgb decreasing but asymptomatic.   · Will send type and screen, hemolysis labs/anemia workup with AM labs as well as occult blood.  · Hgb 7.3 today. Continue to monitor.  · Denies melena, BRBPR. No overt signs of bleeding or blood loss.  · No tachycardia but also under beta blockade.     Hypertension  · Chronic and stable  · Continue Lopressor 50mg PO BID     GERD  · Chronic and stable  · Continue Pantoprazole 40mg PO BID     Diet:  Regular  GI PPx:  PPI  DVT PPx:  Ambulating  Goals of Care:  Full     Disposition:  Pending improvement in cellulitis    Meena Lai II, MD  Internal Medicine PGY3  [phone]

## 2018-03-22 NOTE — NURSING
Patient reports pain unrelieved by Acetamenophen (9/10 before administration, 7/10 after).  Notified Dr. Sen, was told that order would be placed for additional pain medication.

## 2018-03-22 NOTE — PLAN OF CARE
Patient has a follow up appointment with the [facility] on 3/28/2018 at 8:45am located at [address] [place] [address]

## 2018-03-23 VITALS
OXYGEN SATURATION: 100 % | DIASTOLIC BLOOD PRESSURE: 55 MMHG | HEART RATE: 71 BPM | RESPIRATION RATE: 15 BRPM | SYSTOLIC BLOOD PRESSURE: 100 MMHG | BODY MASS INDEX: 28.73 KG/M2 | HEIGHT: 69 IN | WEIGHT: 194 LBS | TEMPERATURE: 99 F

## 2018-03-23 LAB
ABO + RH BLD: NORMAL
ALBUMIN SERPL BCP-MCNC: 2.6 G/DL
ALP SERPL-CCNC: 103 U/L
ALT SERPL W/O P-5'-P-CCNC: 10 U/L
ANION GAP SERPL CALC-SCNC: 7 MMOL/L
ANISOCYTOSIS BLD QL SMEAR: SLIGHT
AST SERPL-CCNC: 12 U/L
BASOPHILS # BLD AUTO: 0.04 K/UL
BASOPHILS NFR BLD: 1.4 %
BILIRUB SERPL-MCNC: 0.1 MG/DL
BLD GP AB SCN CELLS X3 SERPL QL: NORMAL
BUN SERPL-MCNC: 18 MG/DL
CALCIUM SERPL-MCNC: 8.1 MG/DL
CHLORIDE SERPL-SCNC: 106 MMOL/L
CO2 SERPL-SCNC: 25 MMOL/L
CREAT SERPL-MCNC: 1.5 MG/DL
DIFFERENTIAL METHOD: ABNORMAL
EOSINOPHIL # BLD AUTO: 0.2 K/UL
EOSINOPHIL NFR BLD: 6.5 %
ERYTHROCYTE [DISTWIDTH] IN BLOOD BY AUTOMATED COUNT: 18 %
EST. GFR  (AFRICAN AMERICAN): >60 ML/MIN/1.73 M^2
EST. GFR  (NON AFRICAN AMERICAN): 55.8 ML/MIN/1.73 M^2
FERRITIN SERPL-MCNC: 19 NG/ML
GLUCOSE SERPL-MCNC: 86 MG/DL
HCT VFR BLD AUTO: 25.8 %
HGB BLD-MCNC: 7.2 G/DL
HYPOCHROMIA BLD QL SMEAR: ABNORMAL
IMM GRANULOCYTES # BLD AUTO: 0 K/UL
IMM GRANULOCYTES NFR BLD AUTO: 0 %
IRON SERPL-MCNC: 16 UG/DL
LYMPHOCYTES # BLD AUTO: 1.3 K/UL
LYMPHOCYTES NFR BLD: 46.6 %
MAGNESIUM SERPL-MCNC: 1.6 MG/DL
MCH RBC QN AUTO: 20.2 PG
MCHC RBC AUTO-ENTMCNC: 27.9 G/DL
MCV RBC AUTO: 73 FL
MONOCYTES # BLD AUTO: 0.3 K/UL
MONOCYTES NFR BLD: 11.5 %
NEUTROPHILS # BLD AUTO: 1 K/UL
NEUTROPHILS NFR BLD: 34 %
NRBC BLD-RTO: 0 /100 WBC
OVALOCYTES BLD QL SMEAR: ABNORMAL
PHOSPHATE SERPL-MCNC: 4.2 MG/DL
PLATELET # BLD AUTO: 151 K/UL
PMV BLD AUTO: 10.8 FL
POIKILOCYTOSIS BLD QL SMEAR: SLIGHT
POLYCHROMASIA BLD QL SMEAR: ABNORMAL
POTASSIUM SERPL-SCNC: 4.2 MMOL/L
PROT SERPL-MCNC: 5.2 G/DL
RBC # BLD AUTO: 3.56 M/UL
SATURATED IRON: 5 %
SODIUM SERPL-SCNC: 138 MMOL/L
TOTAL IRON BINDING CAPACITY: 327 UG/DL
TRANSFERRIN SERPL-MCNC: 221 MG/DL
TRANSFERRIN SERPL-MCNC: 221 MG/DL
WBC # BLD AUTO: 2.79 K/UL

## 2018-03-23 PROCEDURE — 86901 BLOOD TYPING SEROLOGIC RH(D): CPT

## 2018-03-23 PROCEDURE — 85025 COMPLETE CBC W/AUTO DIFF WBC: CPT

## 2018-03-23 PROCEDURE — 99239 HOSP IP/OBS DSCHRG MGMT >30: CPT | Mod: ,,, | Performed by: HOSPITALIST

## 2018-03-23 PROCEDURE — S0077 INJECTION, CLINDAMYCIN PHOSP: HCPCS | Performed by: HOSPITALIST

## 2018-03-23 PROCEDURE — 25000003 PHARM REV CODE 250: Performed by: HOSPITALIST

## 2018-03-23 PROCEDURE — 80053 COMPREHEN METABOLIC PANEL: CPT

## 2018-03-23 PROCEDURE — 36415 COLL VENOUS BLD VENIPUNCTURE: CPT

## 2018-03-23 PROCEDURE — 82728 ASSAY OF FERRITIN: CPT

## 2018-03-23 PROCEDURE — 83540 ASSAY OF IRON: CPT

## 2018-03-23 PROCEDURE — 83735 ASSAY OF MAGNESIUM: CPT

## 2018-03-23 PROCEDURE — 84100 ASSAY OF PHOSPHORUS: CPT

## 2018-03-23 RX ORDER — CLINDAMYCIN HYDROCHLORIDE 150 MG/1
300 CAPSULE ORAL EVERY 6 HOURS
Status: DISCONTINUED | OUTPATIENT
Start: 2018-03-23 | End: 2018-03-23 | Stop reason: HOSPADM

## 2018-03-23 RX ORDER — QUINIDINE GLUCONATE 324 MG
240 TABLET, EXTENDED RELEASE ORAL
COMMUNITY
Start: 2018-03-23

## 2018-03-23 RX ORDER — HYDROCODONE BITARTRATE AND ACETAMINOPHEN 5; 325 MG/1; MG/1
1 TABLET ORAL EVERY 6 HOURS PRN
Qty: 12 TABLET | Refills: 0 | Status: SHIPPED | OUTPATIENT
Start: 2018-03-23 | End: 2018-03-23

## 2018-03-23 RX ORDER — CLINDAMYCIN HYDROCHLORIDE 300 MG/1
300 CAPSULE ORAL EVERY 6 HOURS
Qty: 40 CAPSULE | Refills: 0 | Status: SHIPPED | OUTPATIENT
Start: 2018-03-23 | End: 2018-04-02

## 2018-03-23 RX ORDER — HYDROCODONE BITARTRATE AND ACETAMINOPHEN 5; 325 MG/1; MG/1
1 TABLET ORAL EVERY 6 HOURS PRN
Qty: 12 TABLET | Refills: 0 | Status: SHIPPED | OUTPATIENT
Start: 2018-03-23 | End: 2018-03-30

## 2018-03-23 RX ADMIN — ALLOPURINOL 100 MG: 100 TABLET ORAL at 08:03

## 2018-03-23 RX ADMIN — CLINDAMYCIN IN 5 PERCENT DEXTROSE 600 MG: 12 INJECTION, SOLUTION INTRAVENOUS at 06:03

## 2018-03-23 RX ADMIN — HYDROCODONE BITARTRATE AND ACETAMINOPHEN 1 TABLET: 5; 325 TABLET ORAL at 08:03

## 2018-03-23 RX ADMIN — HYDROCODONE BITARTRATE AND ACETAMINOPHEN 1 TABLET: 5; 325 TABLET ORAL at 03:03

## 2018-03-23 RX ADMIN — GABAPENTIN 300 MG: 300 CAPSULE ORAL at 08:03

## 2018-03-23 RX ADMIN — PANTOPRAZOLE SODIUM 20 MG: 20 TABLET, DELAYED RELEASE ORAL at 08:03

## 2018-03-23 NOTE — DISCHARGE SUMMARY
Hospital Medicine  Discharge Summary      Patient Name: Stu Mitchell  MRN:  [id number]  Hospital Medicine Team: [facility] [place] [facility] O Nuria Sen MD  Date of Admission:  3/21/2018     Date of Discharge:  03/23/2018  Length of Stay:  LOS: 2 days   Principal Problem:  Cellulitis of lower extremity     Active Hospital Problems    Diagnosis  POA    *Cellulitis of lower extremity [L03.119]  Yes    CKD (chronic kidney disease) stage 3, GFR 30-59 ml/min [N18.3]  Yes    Gout [M10.9]  Yes    Iron (Fe) deficiency anemia [D50.9]  Yes    Hypertension [I10]  Yes     Chronic    GERD (gastroesophageal reflux disease) [K21.9]  Yes     Chronic      Resolved Hospital Problems    Diagnosis Date Resolved POA   No resolved problems to display.          History of Present Illness:  Mr. Stu Mitchell is a 43 y.o. male with HTN, gout and recurrent cellulitis who presents to the ED with LLE cellulitis and pain.  He was just seen in the ED on 3/8 for a similar complaint, and was discharged home on PO Keflex.  He returned to the ED with left anterior shin erythema and pain, that has caused him difficulty with ambulation.  He endorses fevers up to 100.4F and chills.  He denies any trauma to the leg.     In the ED, he was started on IV Clindamycin.  Ultrasound performed on 3/8 was negative for a DVT, so repeat wasn't needed.  He was admitted to Hospital Medicine for IV antibiotics for having failed outpatient therapy      Hospital Course of Principle Problem Addressed:  Cellulitis of Lower Extremity  · 1/4 SIRS criteria:  HR >90  · US 3/8 negative for DVT and cellulitic area marked  · Likely 2/2 beta hemolytic Strep.    · Transition from IV to PO clinda to D/C home with 10 day course of PO clindamycin.       Other Medical Problems Addressed in the Hospital:   CKD Stage 3  · Chronic and stable  · Monitor renal function  · Continue IVF     Gout  · Chronic and stable  · Continue Allopurinol 100mg PO TID     Iron Deficiency Anemia  · Hgb  decreasing but asymptomatic.   · Will send type and screen, hemolysis labs/anemia workup with AM labs as well as occult blood.  · Hgb 7.3 today. Continue to monitor.  · Denies melena, BRBPR. No overt signs of bleeding or blood loss.  ? No tachycardia but also under beta blockade.     Hypertension  · Chronic and stable  · Continue Lopressor 50mg PO BID     GERD  · Chronic and stable  · Continue Pantoprazole 40mg PO BID          Recent Labs  Lab 03/21/18  1600 03/22/18  0401 03/23/18  0453   WBC 4.92 3.08* 2.79*   HGB 8.1* 7.3* 7.2*   HCT 28.9* 26.1* 25.8*    115* 151       Recent Labs  Lab 03/21/18  1600 03/22/18  0401 03/23/18  0453    139 138   K 4.1 4.7 4.2    110 106   CO2 28 22* 25   BUN 14 12 18   CREATININE 1.5* 1.4 1.5*   GLU 95 83 86   CALCIUM 9.4 8.3* 8.1*   MG  --  1.9 1.6   PHOS  --  3.7 4.2       Recent Labs  Lab 03/21/18  1600 03/22/18  0401 03/23/18  0453   ALKPHOS 133 104 103   ALT 16 11 10   AST 20 19 12   ALBUMIN 3.6 2.7* 2.6*   PROT 6.9 5.4* 5.2*   BILITOT 0.4 0.2 0.1      No results for input(s): POCTGLUCOSE in the last 168 hours.  No results for input(s): CPK, CPKMB, MB, TROPONINI in the last 72 hours.    Recent Labs      03/21/18   1600   LACTATE  0.7          Significant Treatments/Procedures: None    Consultants:  None    Discharge Medication List as of 3/23/2018  9:40 AM      START taking these medications    Details   clindamycin (CLEOCIN) 300 MG capsule Take 1 capsule (300 mg total) by mouth every 6 (six) hours., Starting Fri 3/23/2018, Until Mon 4/2/2018, Normal         CONTINUE these medications which have CHANGED    Details   hydrocodone-acetaminophen 5-325mg (NORCO) 5-325 mg per tablet Take 1 tablet by mouth every 6 (six) hours as needed for Pain., Starting Fri 3/23/2018, Print         CONTINUE these medications which have NOT CHANGED    Details   allopurinol (ZYLOPRIM) 100 MG tablet Take 100 mg by mouth 4 (four) times daily., Historical Med      colchicine 0.6 mg  tablet 0.3 mg (1/2 tab) once daily, Print      gabapentin (NEURONTIN) 300 MG capsule Take 300 mg by mouth 3 (three) times daily., Historical Med      metoprolol tartrate (LOPRESSOR) 50 MG tablet Take 50 mg by mouth 2 (two) times daily., Historical Med      mupirocin (BACTROBAN) 2 % ointment Apply topically 3 (three) times daily., Starting Mon 3/5/2018, Print      pantoprazole (PROTONIX) 40 MG tablet Take 20 mg by mouth 2 (two) times daily. , Historical Med         STOP taking these medications       oxyCODONE-acetaminophen (PERCOCET) 5-325 mg per tablet Comments:   Reason for Stopping:         predniSONE (DELTASONE) 5 MG tablet Comments:   Reason for Stopping:               Discharge Diet:cardiac diet     Activity: activity as tolerated    Discharge Condition: Good    Disposition: Home or Self Care     Time spent on the discharge of the patient including review of hospital course with the patient. reviewing discharge medications and arranging follow-up care 35 minutes.  Patient was seen and examined on the date of discharge and determined to be suitable for discharge.    No future appointments.    Nuria Sen MD  [facility]  Cell:  [phone]  Spectra:  [phone]  Pager:  [phone]

## 2018-03-23 NOTE — PLAN OF CARE
Problem: Patient Care Overview  Goal: Plan of Care Review  Outcome: Ongoing (interventions implemented as appropriate)  Pt A&O x 4.  VSS on room air.  Pt c/o pain 7/10 - 8/10 to L knee and leg.  Given PRN hydrocodone 5 mg x 2, moderate relief obtained. Reassessed pain 6/10.  Given IV clindamycin. Will continue to monitor pt.

## 2018-03-23 NOTE — NURSING
Discharge instructions and prescriptions given to patient.  Patient verbalized understanding and had no further questions.  Patient IV removed and vital signs within normal limits.  Patient now awaiting ride, will continue to monitor.

## 2018-03-23 NOTE — PROGRESS NOTES
Progress Note   Hospital Medicine         Patient Name: Stu Mitchell  MRN:  [id number]  [facility] Medicine Team: [facility]OSP MED JABARI Reynolds II  Date of Admission:  3/21/2018     Length of Stay:  LOS: 2 days   Expected Discharge Date: 3/23/2018  Principal Problem:  Cellulitis of lower extremity       Subjective:     Interval History/Overnight Events:    NAEON VSSA. Admitted to -O overnight for cellulitis and IV abx having failed oral therapies. Tolerating Abx well. Cellulitis minimally changed from overnight based on markings and documented exam.     Review of Systems   Constitutional: Negative for chills, fatigue, fever.   HENT: Negative for sore throat, trouble swallowing.    Eyes: Negative for photophobia, visual disturbance.   Respiratory: Negative for cough, shortness of breath.    Cardiovascular: Negative for chest pain, palpitations, leg swelling.   Gastrointestinal: Negative for abdominal pain, constipation, diarrhea, nausea, vomiting.   Endocrine: Negative for cold intolerance, heat intolerance.   Genitourinary: Negative for dysuria, frequency.   Musculoskeletal: Negative for arthralgias, myalgias.   Skin: + erythema Negative for rash, wound  Neurological: Negative for dizziness, syncope, weakness, light-headedness.   Psychiatric/Behavioral: Negative for confusion, hallucinations, anxiety  All other systems reviewed and are negative.    Objective:     Temp:  [96.6 °F (35.9 °C)-98.8 °F (37.1 °C)]   Pulse:  [70-76]   Resp:  [15-18]   BP: (100-129)/(55-68)   SpO2:  [95 %-100 %]       Physical Exam:  Constitutional: Appears well-developed and well-nourished.   Head: Normocephalic and atraumatic.   Mouth/Throat: Oropharynx is clear and moist.   Eyes: EOM are normal. Pupils are equal, round, and reactive to light. No scleral icterus.   Neck: Normal range of motion. Neck supple.   Cardiovascular: Normal rate and regular rhythm.  No murmur heard.  Pulmonary/Chest: Effort normal and breath sounds normal.  No respiratory distress. No wheezes, rales, or rhonchi  Abdominal: Soft. Bowel sounds are normal.  No distension or tenderness  Musculoskeletal: Normal range of motion. No edema.   Neurological: Alert and oriented to person, place, and time. Occasional chills  Skin: Skin is warm and dry. LLE with mild erythema and edema and warmth up to mid shin. Markedly improved from yesterday's exam.   Psychiatric: Normal mood and affect. Behavior is normal.   Vitals reviewed.          Recent Labs  Lab 03/21/18  1600 03/22/18  0401 03/23/18  0453   WBC 4.92 3.08* 2.79*   HGB 8.1* 7.3* 7.2*   HCT 28.9* 26.1* 25.8*    115* 151       Recent Labs  Lab 03/21/18  1600 03/22/18  0401 03/23/18  0453    139 138   K 4.1 4.7 4.2    110 106   CO2 28 22* 25   BUN 14 12 18   CREATININE 1.5* 1.4 1.5*   GLU 95 83 86   CALCIUM 9.4 8.3* 8.1*   MG  --  1.9 1.6   PHOS  --  3.7 4.2       Recent Labs  Lab 03/21/18  1600 03/22/18  0401 03/23/18  0453   ALKPHOS 133 104 103   ALT 16 11 10   AST 20 19 12   ALBUMIN 3.6 2.7* 2.6*   PROT 6.9 5.4* 5.2*   BILITOT 0.4 0.2 0.1     No results for input(s): POCTGLUCOSE in the last 168 hours.     allopurinol  100 mg Oral TID    clindamycin  300 mg Oral Q6H    gabapentin  300 mg Oral TID    metoprolol tartrate  50 mg Oral BID    pantoprazole  20 mg Oral BID       Assessment and Plan     Mr. Stu Mitchell is a 43 y.o. male who presented to [facility] on 3/21/2018 with     Hospital Course:    Mr. Stu Mitchell was admitted to Hospital Medicine for management of     Active Hospital Problems    Diagnosis  POA    *Cellulitis of lower extremity [L03.119]  Yes    CKD (chronic kidney disease) stage 3, GFR 30-59 ml/min [N18.3]  Yes    Gout [M10.9]  Yes    Iron (Fe) deficiency anemia [D50.9]  Yes    Hypertension [I10]  Yes     Chronic    GERD (gastroesophageal reflux disease) [K21.9]  Yes     Chronic      Resolved Hospital Problems    Diagnosis Date Resolved POA   No resolved problems to  display.           Cellulitis of Lower Extremity  · 1/4 SIRS criteria:  HR >90  · US 3/8 negative for DVT and cellulitic area marked  · Likely 2/2 beta hemolytic Strep.    · Transition to PO clinda this morning  · D/C home with 10 day course of PO clindamycin.   ·      CKD Stage 3  · Chronic and stable  · Monitor renal function  · Continue IVF     Gout  · Chronic and stable  · Continue Allopurinol 100mg PO TID     Iron Deficiency Anemia  · Hgb decreasing but asymptomatic.   · Will send type and screen, hemolysis labs/anemia workup with AM labs as well as occult blood.  · Hgb 7.3 today. Continue to monitor.  · Denies melena, BRBPR. No overt signs of bleeding or blood loss.  · No tachycardia but also under beta blockade.     Hypertension  · Chronic and stable  · Continue Lopressor 50mg PO BID     GERD  · Chronic and stable  · Continue Pantoprazole 40mg PO BID     Diet:  Regular  GI PPx:  PPI  DVT PPx:  Ambulating  Goals of Care:  Full     Disposition:  Home today  Meena Lai II, MD  Internal Medicine PGY3  [phone]

## 2018-03-23 NOTE — PROGRESS NOTES
Progress Note   Hospital Medicine         Patient Name: Stu Mitchell  MRN:  [id number]  [facility] Medicine Team: [facility] MED JABARI Reynolds II  Date of Admission:  3/21/2018     Length of Stay:  LOS: 2 days   Expected Discharge Date: 3/24/2018  Principal Problem:  Cellulitis of lower extremity       Subjective:     Interval History/Overnight Events:    NAEON VSSA. Cellullitis appears markedly improved this morning. Will discharge home on PO clindamycin.    Review of Systems   Constitutional: Negative for chills, fatigue, fever.   HENT: Negative for sore throat, trouble swallowing.    Eyes: Negative for photophobia, visual disturbance.   Respiratory: Negative for cough, shortness of breath.    Cardiovascular: Negative for chest pain, palpitations, leg swelling.   Gastrointestinal: Negative for abdominal pain, constipation, diarrhea, nausea, vomiting.   Endocrine: Negative for cold intolerance, heat intolerance.   Genitourinary: Negative for dysuria, frequency.   Musculoskeletal: Negative for arthralgias, myalgias.   Skin: + erythema Negative for rash, wound  Neurological: Negative for dizziness, syncope, weakness, light-headedness.   Psychiatric/Behavioral: Negative for confusion, hallucinations, anxiety  All other systems reviewed and are negative.    Objective:     Temp:  [96.6 °F (35.9 °C)-98.8 °F (37.1 °C)]   Pulse:  [70-76]   Resp:  [15-18]   BP: (100-129)/(55-68)   SpO2:  [95 %-100 %]       Physical Exam:  Constitutional: Appears well-developed and well-nourished.   Head: Normocephalic and atraumatic.   Mouth/Throat: Oropharynx is clear and moist.   Eyes: EOM are normal. Pupils are equal, round, and reactive to light. No scleral icterus.   Neck: Normal range of motion. Neck supple.   Cardiovascular: Normal rate and regular rhythm.  No murmur heard.  Pulmonary/Chest: Effort normal and breath sounds normal. No respiratory distress. No wheezes, rales, or rhonchi  Abdominal: Soft. Bowel sounds are normal.   No distension or tenderness  Musculoskeletal: Normal range of motion. No edema.   Neurological: Alert and oriented to person, place, and time. Occasional chills  Skin: Skin is warm and dry. LLE with mild erythema and edema and warmth up to mid shin  Psychiatric: Normal mood and affect. Behavior is normal.   Vitals reviewed.      Recent Labs  Lab 03/21/18  1600 03/22/18  0401 03/23/18  0453   WBC 4.92 3.08* 2.79*   HGB 8.1* 7.3* 7.2*   HCT 28.9* 26.1* 25.8*    115* 151       Recent Labs  Lab 03/21/18  1600 03/22/18  0401 03/23/18  0453    139 138   K 4.1 4.7 4.2    110 106   CO2 28 22* 25   BUN 14 12 18   CREATININE 1.5* 1.4 1.5*   GLU 95 83 86   CALCIUM 9.4 8.3* 8.1*   MG  --  1.9 1.6   PHOS  --  3.7 4.2       Recent Labs  Lab 03/21/18  1600 03/22/18  0401 03/23/18  0453   ALKPHOS 133 104 103   ALT 16 11 10   AST 20 19 12   ALBUMIN 3.6 2.7* 2.6*   PROT 6.9 5.4* 5.2*   BILITOT 0.4 0.2 0.1     No results for input(s): POCTGLUCOSE in the last 168 hours.     allopurinol  100 mg Oral TID    clindamycin  300 mg Oral Q6H    gabapentin  300 mg Oral TID    metoprolol tartrate  50 mg Oral BID    pantoprazole  20 mg Oral BID       Assessment and Plan     Mr. Stu Mitchell is a 43 y.o. male who presented to [facility] on 3/21/2018 with     Hospital Course:    Mr. Stu Mitchell was admitted to Hospital Medicine for management of     Active Hospital Problems    Diagnosis  POA    *Cellulitis of lower extremity [L03.119]  Yes    CKD (chronic kidney disease) stage 3, GFR 30-59 ml/min [N18.3]  Yes    Gout [M10.9]  Yes    Iron (Fe) deficiency anemia [D50.9]  Yes    Hypertension [I10]  Yes     Chronic    GERD (gastroesophageal reflux disease) [K21.9]  Yes     Chronic      Resolved Hospital Problems    Diagnosis Date Resolved POA   No resolved problems to display.           Cellulitis of Lower Extremity  · 1/4 SIRS criteria:  HR >90  · US 3/8 negative for DVT and cellulitic area marked  · Likely 2/2  beta hemolytic Strep.    · De-esdcalate from Clindamycin IV to Clindamycin PO and D/C today.  · PT/OT     CKD Stage 3  · Chronic and stable  · Monitor renal function  · Continue IVF     Gout  · Chronic and stable  · Continue Allopurinol 100mg PO TID     Iron Deficiency Anemia  · Hgb decreasing but asymptomatic.   · Will send type and screen, hemolysis labs/anemia workup with AM labs as well as occult blood.  · Hgb 7.3 today. Continue to monitor.  · Denies melena, BRBPR. No overt signs of bleeding or blood loss.  · No tachycardia but also under beta blockade.     Hypertension  · Chronic and stable  · Continue Lopressor 50mg PO BID     GERD  · Chronic and stable  · Continue Pantoprazole 40mg PO BID     Diet:  Regular  GI PPx:  PPI  DVT PPx:  Ambulating  Goals of Care:  Full     Disposition: DC Home Today.    Meena Lai II, MD  Internal Medicine PGY3  [phone]

## 2018-03-23 NOTE — PROGRESS NOTES
Pharmacist Intervention IV to PO Note    Stu Mitchell is a 43 y.o. male being treated with IV medication clindamycin    Patient Data:    Vital Signs (Most Recent):  Temp: 98.6 °F (37 °C) (03/23/18 0747)  Pulse: 71 (03/23/18 0747)  Resp: 15 (03/23/18 0747)  BP: (!) 100/55 (03/23/18 0747)  SpO2: 100 % (03/23/18 0747)   Vital Signs (72h Range):  Temp:  [96.6 °F (35.9 °C)-98.8 °F (37.1 °C)]   Pulse:  []   Resp:  [12-18]   BP: (100-142)/(55-80)   SpO2:  [95 %-100 %]      CBC:    Recent Labs     Lab 03/21/18  1600 03/22/18  0401 03/23/18  0453   WBC 4.92 3.08* 2.79*   RBC 3.99* 3.57* 3.56*   HGB 8.1* 7.3* 7.2*   HCT 28.9* 26.1* 25.8*    115* 151   MCV 72* 73* 73*   MCH 20.3* 20.4* 20.2*   MCHC 28.0* 28.0* 27.9*     CMP:     Recent Labs     Lab 03/21/18  1600 03/22/18  0401 03/23/18  0453   GLU 95 83 86   CALCIUM 9.4 8.3* 8.1*   ALBUMIN 3.6 2.7* 2.6*   PROT 6.9 5.4* 5.2*    139 138   K 4.1 4.7 4.2   CO2 28 22* 25    110 106   BUN 14 12 18   CREATININE 1.5* 1.4 1.5*   ALKPHOS 133 104 103   ALT 16 11 10   AST 20 19 12   BILITOT 0.4 0.2 0.1       Dietary Orders:  Diet Orders            Diet Adult Regular (IDDSI Level 7): Regular starting at 03/21 1737            Based on the following criteria, this patient qualifies for intravenous to oral conversion:  [x] The patients gastrointestinal tract is functioning (tolerating medications via oral or enteral route for 24 hours and tolerating food or enteral feeds for 24 hours).  [x] The patient is hemodynamically stable for 24 hours (heart rate <100 beats per minute, systolic blood pressure >99 mm Hg, and respiratory rate <20 breaths per minute).  [x] The patient shows clinical improvement (afebrile for at least 24 hours and white blood cell count downtrending or normalized). Additionally, the patient must be non-neutropenic (absolute neutrophil count >500 cells/mm3).  [x] For antimicrobials, the patient has received IV therapy for at least 24  hours.    IV medication clindamycin 600 mg every 8 hours will be changed to oral medication clindamycin 300 mg every 6 hours    Pharmacist's Name: France Grant  Pharmacist's Extension: [id number]

## 2018-03-25 ENCOUNTER — HOSPITAL ENCOUNTER (EMERGENCY)
Facility: OTHER | Age: 43
Discharge: HOME | End: 2018-03-25
Attending: EMERGENCY MEDICINE
Payer: MEDICAID

## 2018-03-25 VITALS
SYSTOLIC BLOOD PRESSURE: 120 MMHG | RESPIRATION RATE: 16 BRPM | HEART RATE: 74 BPM | WEIGHT: 185 LBS | TEMPERATURE: 98 F | OXYGEN SATURATION: 100 % | DIASTOLIC BLOOD PRESSURE: 60 MMHG | HEIGHT: 69 IN | BODY MASS INDEX: 27.4 KG/M2

## 2018-03-25 DIAGNOSIS — R21 RASH AND NONSPECIFIC SKIN ERUPTION: ICD-10-CM

## 2018-03-25 DIAGNOSIS — R60.9 EDEMA, UNSPECIFIED TYPE: Primary | ICD-10-CM

## 2018-03-25 PROCEDURE — 25000003 PHARM REV CODE 250: Performed by: EMERGENCY MEDICINE

## 2018-03-25 PROCEDURE — 99283 EMERGENCY DEPT VISIT LOW MDM: CPT

## 2018-03-25 RX ORDER — DIPHENHYDRAMINE HCL 25 MG
50 CAPSULE ORAL
Status: COMPLETED | OUTPATIENT
Start: 2018-03-25 | End: 2018-03-25

## 2018-03-25 RX ADMIN — DIPHENHYDRAMINE HYDROCHLORIDE 50 MG: 25 CAPSULE ORAL at 10:03

## 2018-03-25 RX ADMIN — Medication: at 10:03

## 2018-03-25 NOTE — ED PROVIDER NOTES
Encounter Date: 3/25/2018    SCRIBE #1 NOTE: I, Sujey Ta, am scribing for, and in the presence of, Dr. Mendoza.       History     Chief Complaint   Patient presents with    Leg Pain     Patient reports pain in left knee, and left shin. Complains of swelling that has been treated with IV abx before     Time seen by provider: 10:23 AM    This is a 43 y.o. Male, with history of HTN,  who presents with complaint of pain to the left leg that has been constant since yesterday. The patient was admitted to [facility] on 3/21/2018 to 3/23/2018 for cellulitis to the LLE. He reports that there was minimal swelling when he was discharged two days ago. The patient currently reports associated itchiness, swelling, and bleeding. He denies SOB, fever, chills, myalgias, chest pain, or abdominal pain. The patient reports that his symptoms were unchanged by Bactroban.        The history is provided by the patient.     Review of patient's allergies indicates:   Allergen Reactions    Nsaids (non-steroidal anti-inflammatory drug)      Hx of GI bleed     Past Medical History:   Diagnosis Date    Arthritis 10/11/2013    Blood transfusion     Cellulitis     GERD (gastroesophageal reflux disease)     GI bleed     Gout attack     Hypertension     Neuropathy     Osteomyelitis      Past Surgical History:   Procedure Laterality Date    CYST REMOVAL      INCISION AND DRAINAGE OF WOUND      left hand third finger    SURERY ON LEFT MIDDLE FINGER      OSTEOMYLITIS     Family History   Problem Relation Age of Onset    Heart disease Mother     Diabetes Mother     Kidney disease Mother     Hypertension Mother     Stroke Mother     Cancer Mother      Social History   Substance Use Topics    Smoking status: Never Smoker    Smokeless tobacco: Never Used    Alcohol use No     Review of Systems   Constitutional: Negative for activity change, appetite change, chills, diaphoresis and fever.   HENT: Negative for  congestion, sore throat and trouble swallowing.    Eyes: Negative for photophobia and visual disturbance.   Respiratory: Negative for cough, chest tightness and shortness of breath.    Cardiovascular: Positive for leg swelling (Left.). Negative for chest pain.   Gastrointestinal: Negative for abdominal pain, nausea and vomiting.   Endocrine: Negative for polydipsia, polyphagia and polyuria.   Genitourinary: Negative for difficulty urinating and flank pain.   Musculoskeletal: Negative for back pain, myalgias and neck pain.        Positive for pain to the left leg. Positive for itchiness to the left leg. Positive for bleeding to the left leg.   Skin: Negative for pallor.   Neurological: Negative for weakness and headaches.   Psychiatric/Behavioral: Negative for confusion.       Physical Exam     Initial Vitals [03/25/18 1007]   BP Pulse Resp Temp SpO2   120/60 74 16 98.1 °F (36.7 °C) 100 %      MAP       80         Physical Exam    Nursing note and vitals reviewed.  Constitutional: He appears well-developed and well-nourished. No distress.   HENT:   Head: Normocephalic and atraumatic.   Eyes: Conjunctivae and EOM are normal. Pupils are equal, round, and reactive to light.   Neck: Normal range of motion. Neck supple.   Cardiovascular: Normal rate and normal heart sounds.   Pulmonary/Chest: Effort normal and breath sounds normal.   Abdominal: Soft. Normal appearance and bowel sounds are normal. There is no tenderness.   Musculoskeletal: Normal range of motion. He exhibits edema. He exhibits no tenderness.   Symmetrical 2+ non-pitting edema from feet to knees bilaterally. No calf tenderness.   Neurological: He is alert and oriented to person, place, and time. He has normal strength. No cranial nerve deficit or sensory deficit.   Skin: Skin is warm and dry.   Superficial linear excoriations present to left shin. No weeping. No crepitus.    Psychiatric: He has a normal mood and affect. His behavior is normal. Thought  content normal.         ED Course   Procedures  Labs Reviewed - No data to display          Medical Decision Making:   Initial Assessment:   Urgent evaluation of 43-year-old gentleman with hypertension, chronic lower extremity edema here with complaint of swelling, and itching to the left shin.  Patient was last admitted and discharged 2 days previously for cellulitis to that region, and discharged with by mouth antibiotics.  Ultrasound negative for DVT 3/8 and sent home with clindamycin. Here pt has excoriations present to the anterior surface of shin, though without drainage or overt cellulitis, and pt afebrile without concern for failed abx course. Pt encouraged on personal hygiene, compressions stockings and fu podiatry and PCP for repeat wound checks.  Will admin antihistamine po and dc home with topical ointment.             Scribe Attestation:   Scribe #1: I performed the above scribed service and the documentation accurately describes the services I performed. I attest to the accuracy of the note.    Attending Attestation:           Physician Attestation for Scribe:  Physician Attestation Statement for Scribe #1: I, Dr. Mendoza, reviewed documentation, as scribed by Sujey Ta in my presence, and it is both accurate and complete.                    Clinical Impression:     1. Edema, unspecified type    2. Rash and nonspecific skin eruption        Disposition:   Disposition: Discharged  Condition: Stable                        Lisbet Mendoza MD  03/25/18 8598

## 2018-03-25 NOTE — ED NOTES
Pt with bilateral lower extremity edema noted to R + L lower leg and feet x December. Pt recently admitted for cellulitis at [facility]. Pt with rash noted to lower legs. Otherwise, no errythema noted to bilateral LE. Denies SOB, CP, fevers, chills. Pt AAOx4 and appropriate at this time. Respirations even and unlabored. No acute distress noted.

## 2018-03-26 LAB
BACTERIA BLD CULT: NORMAL
BACTERIA BLD CULT: NORMAL

## 2018-03-30 ENCOUNTER — HOSPITAL ENCOUNTER (EMERGENCY)
Facility: HOSPITAL | Age: 43
Discharge: HOME | End: 2018-03-30
Attending: EMERGENCY MEDICINE
Payer: MEDICAID

## 2018-03-30 VITALS
RESPIRATION RATE: 18 BRPM | BODY MASS INDEX: 25.77 KG/M2 | TEMPERATURE: 99 F | OXYGEN SATURATION: 98 % | HEIGHT: 70 IN | WEIGHT: 180 LBS | DIASTOLIC BLOOD PRESSURE: 78 MMHG | SYSTOLIC BLOOD PRESSURE: 152 MMHG | HEART RATE: 78 BPM

## 2018-03-30 DIAGNOSIS — R21 RASH AND NONSPECIFIC SKIN ERUPTION: Primary | ICD-10-CM

## 2018-03-30 PROCEDURE — 99284 EMERGENCY DEPT VISIT MOD MDM: CPT | Mod: ,,, | Performed by: EMERGENCY MEDICINE

## 2018-03-30 PROCEDURE — 99283 EMERGENCY DEPT VISIT LOW MDM: CPT

## 2018-03-30 RX ORDER — HYDROXYZINE HYDROCHLORIDE 25 MG/1
25 TABLET, FILM COATED ORAL 3 TIMES DAILY
Qty: 21 TABLET | Refills: 0 | Status: SHIPPED | OUTPATIENT
Start: 2018-03-30 | End: 2019-01-31

## 2018-03-30 NOTE — ED NOTES
Received pt  Pt awake, alert, and oriented x4  Pt c/o rash on the Left lower abd  And R shoulder  MSE pending  Will continue to monitor

## 2018-03-30 NOTE — ED NOTES
LOC: The patient is awake, alert and aware of environment with an appropriate affect, the patient is oriented x 3 and speaking appropriately.  APPEARANCE: Patient resting comfortably and in no acute distress, patient is clean and well groomed  SKIN: The skin is warm and dry, color consistent with ethnicity, patient has normal skin turgor and moist mucus membranes, skin intact. Pt has diffuse rash to L legy, Left lower abd, and R shoulder  MUSCULOSKELETAL: Patient moving all extremities well, no obvious swelling or deformities noted.   RESPIRATORY: Airway is open and patent, breath sounds clear throughout all lung fields; respirations are spontaneous, patient has a normal effort and rate, no accessory muscle use noted.   CARDIAC: Patient has no peripheral edema noted, capillary refill < 3 seconds. No complaints of chest pain   ABDOMEN: Soft and non tender to palpation, no distention noted. Bowel sounds present x 4  NEUROLOGIC: PERRL, 3mm bilaterally, eyes open spontaneously, behavior appropriate to situation, follows commands, facial expression symmetrical, bilateral hand grasp equal and even, purposeful motor response noted, normal sensation in all extremities when touched with a finger.

## 2018-03-30 NOTE — ED PROVIDER NOTES
Encounter Date: 3/30/2018    SCRIBE #1 NOTE: I, Jose Siddiqui, am scribing for, and in the presence of,  Dr. Fernandes. I have scribed the following portions of the note -       History     Chief Complaint   Patient presents with    Skin Problem     skin on fire, was admitted for cellulitis is past  week     Time patient was seen by the provider: 2:25 PM      The patient is a 43 y.o. male with co-morbidities including: gout, cellulitis who presents to the ED with a complaint of urticaria x 4 days. Pt reports worsening rash and pain to L shin, abdomen and R shoulder. He was recently discharged for LLE cellulitis which resolved at home using Bactroban topical ointment and Benadryl. He notes additional chills but denies fever. He has no other complaints at this time. No new changes in diet or recent prescription changes.          Review of patient's allergies indicates:   Allergen Reactions    Nsaids (non-steroidal anti-inflammatory drug)      Hx of GI bleed     Past Medical History:   Diagnosis Date    Arthritis 10/11/2013    Blood transfusion     Cellulitis     GERD (gastroesophageal reflux disease)     GI bleed     Gout attack     Hypertension     Neuropathy     Osteomyelitis      Past Surgical History:   Procedure Laterality Date    CYST REMOVAL      INCISION AND DRAINAGE OF WOUND      left hand third finger    SURERY ON LEFT MIDDLE FINGER      OSTEOMYLITIS     Family History   Problem Relation Age of Onset    Heart disease Mother     Diabetes Mother     Kidney disease Mother     Hypertension Mother     Stroke Mother     Cancer Mother      Social History   Substance Use Topics    Smoking status: Never Smoker    Smokeless tobacco: Never Used    Alcohol use No     Review of Systems   Constitutional: Positive for chills. Negative for appetite change and fever.   HENT: Negative for sore throat.    Eyes: Negative for visual disturbance.   Respiratory: Negative for shortness of breath.     Cardiovascular: Negative for chest pain.   Gastrointestinal: Negative for abdominal pain.   Genitourinary: Negative for dysuria.   Musculoskeletal: Negative for back pain.   Skin: Positive for rash (L shin, abdomen, R shoulder).   Neurological: Negative for headaches.       Physical Exam     Initial Vitals [03/30/18 1419]   BP Pulse Resp Temp SpO2   (!) 140/65 79 18 98.6 °F (37 °C) 98 %      MAP       90         Physical Exam    Nursing note and vitals reviewed.  Constitutional: He appears well-developed and well-nourished. He is not diaphoretic. No distress.   HENT:   Head: Normocephalic and atraumatic.   Eyes: Conjunctivae and EOM are normal.   Neck: Normal range of motion. Neck supple.   Cardiovascular: Normal rate and regular rhythm.   Pulmonary/Chest: Breath sounds normal. No respiratory distress.   Abdominal: Soft. He exhibits no distension. There is no tenderness. There is no guarding.   Musculoskeletal: Normal range of motion.   Neurological: He is alert and oriented to person, place, and time.   Skin: Skin is warm and dry. Rash (chronic, non infected appearing, excoriated covering leg, abdomen and R shoulder, mild nodules and blanching noted) noted.         ED Course   Procedures  Labs Reviewed - No data to display          Medical Decision Making:   History:   Old Medical Records: I decided to obtain old medical records.  Initial Assessment:   44 yo male presents with chronic rash that is non infected, excoriated. No evidence of underlying pathology. Will treat with different anti histamines and re-assure pt to continue using antibiotic ointment. Plan to DC with PCP follow up.             Scribe Attestation:   Scribe #1: I performed the above scribed service and the documentation accurately describes the services I performed. I attest to the accuracy of the note.              Clinical Impression:   The encounter diagnosis was Rash and nonspecific skin eruption.    Disposition:   Disposition:  Discharged  Condition: Stable    I, Dr. Familia Fernandes, personally performed the services described in this documentation.   All medical record entries made by the scribe were at my direction and in my presence.   I have reviewed the chart and agree that the record is accurate and complete.   Familia Fernandes MD.  7:47 PM 03/30/2018                     Familia Fernandes MD  03/30/18 1946       Familia Fernandes MD  03/30/18 1947

## 2018-04-04 ENCOUNTER — HOSPITAL ENCOUNTER (EMERGENCY)
Facility: HOSPITAL | Age: 43
Discharge: HOME | End: 2018-04-04
Attending: EMERGENCY MEDICINE
Payer: MEDICAID

## 2018-04-04 VITALS
SYSTOLIC BLOOD PRESSURE: 152 MMHG | RESPIRATION RATE: 18 BRPM | TEMPERATURE: 99 F | DIASTOLIC BLOOD PRESSURE: 70 MMHG | OXYGEN SATURATION: 100 % | WEIGHT: 182 LBS | BODY MASS INDEX: 26.05 KG/M2 | HEART RATE: 98 BPM | HEIGHT: 70 IN

## 2018-04-04 DIAGNOSIS — L03.012 CELLULITIS OF FINGER OF LEFT HAND: Primary | ICD-10-CM

## 2018-04-04 PROCEDURE — 99283 EMERGENCY DEPT VISIT LOW MDM: CPT

## 2018-04-05 NOTE — ED TRIAGE NOTES
43 year old male pt presents to the ed with complaints of bump to pinky finger to left hand . Pt denies chest pain sob but complains of nausea. Pt is awake alert and oriented x4.

## 2018-04-05 NOTE — DISCHARGE INSTRUCTIONS
Apply bactroban to affected area two times a day. Also encourage warm soaks.     Our goal in the emergency department is to always give you outstanding care and exceptional service. You may receive a survey by mail or e-mail in the next week regarding your experience in our ED. We would greatly appreciate your completing and returning the survey. Your feedback provides us with a way to recognize our staff who give very good care and it helps us learn how to improve when your experience was below our aspiration of excellence.

## 2018-04-05 NOTE — ED NOTES
Pain: Denies at present.    Psychosocial: Patient is calm and cooperative. Patients insight and judgement are appropriate to situation. Appears clean, well maintained, with clothing appropriate to environment. No evidence of delusions, hallucinations, or psychosis.    Neuro: Eyes open spontaneously. Awake, alert, oriented x 4. Speech clear and appropriate. Tolerating saliva secretions well. Able to follow commands, demonstrating ability to actively and appropriately communicate within context of current conversation. Symmetrical facial muscles. Moving all extremities well with no noted weakness. Adequate muscle tone present. Movement is purposeful. No evidence of impaired sensation. Responds to external stimuli with appropriate reflexes.     Airway: Bilateral chest rise and fall. RR regular and non-labored. Air entry patent and clear x 5 lobes of the lungs. No crepitus or subcutaneous emphysema noted on palpation.     Circulatory: Skin warm, dry, and pink. Apical and radial pulses strong and regular. Capillary refill/skin blanching less than 3 seconds to distal of 4 extremities. Placed on CM in NSR without ectopy.    Abdomen: Abdomen obese, soft and non-distended. Positive normo-active bowel sounds x 4 quadrants.     Urinary: Patient reports routine urination without pain, frequency, or urgency. Voids independently. Reports urine appears cuauhtemoc/yellow in color.    Extremities: No redness, heat, swelling, deformity, or pain.     Skin: Intact with no bruising/discolorations noted. Bump to left pinky

## 2018-04-05 NOTE — ED PROVIDER NOTES
"Encounter Date: 4/4/2018       History     Chief Complaint   Patient presents with    Bump to Finger     Patient reports a small bump to left pinky, scant clear drainage noted. no redness or swelling     43-year-old male with medical history of hypertension, gout, neuropathy presents the ED with left pinky finger pain.  States he has had a bump to his finger for "years".  Reports it started oozing clear liquid yesterday.  Denies fever, chills, decreased sensation, decreased range of motion.          Review of patient's allergies indicates:   Allergen Reactions    Nsaids (non-steroidal anti-inflammatory drug)      Hx of GI bleed     Past Medical History:   Diagnosis Date    Arthritis 10/11/2013    Blood transfusion     Cellulitis     GERD (gastroesophageal reflux disease)     GI bleed     Gout attack     Hypertension     Neuropathy     Osteomyelitis      Past Surgical History:   Procedure Laterality Date    CYST REMOVAL      INCISION AND DRAINAGE OF WOUND      left hand third finger    SURERY ON LEFT MIDDLE FINGER      OSTEOMYLITIS     Family History   Problem Relation Age of Onset    Heart disease Mother     Diabetes Mother     Kidney disease Mother     Hypertension Mother     Stroke Mother     Cancer Mother      Social History   Substance Use Topics    Smoking status: Never Smoker    Smokeless tobacco: Never Used    Alcohol use No     Review of Systems   Constitutional: Negative for chills, diaphoresis, fatigue and fever.   HENT: Negative for congestion and sore throat.    Eyes: Negative for visual disturbance.   Respiratory: Negative for cough and shortness of breath.    Cardiovascular: Negative for chest pain and leg swelling.   Gastrointestinal: Negative for abdominal pain, nausea and vomiting.   Genitourinary: Negative for dysuria and hematuria.   Musculoskeletal: Negative for neck pain.   Skin: Positive for wound.   Neurological: Negative for syncope, weakness, light-headedness and " "headaches.   Psychiatric/Behavioral: The patient is not nervous/anxious.        Physical Exam     Initial Vitals [04/04/18 1909]   BP Pulse Resp Temp SpO2   (!) 152/70 98 18 98.5 °F (36.9 °C) 100 %      MAP       97.33         Physical Exam    Vitals reviewed.  Constitutional: He appears well-developed and well-nourished. He is not diaphoretic. No distress.   HENT:   Head: Normocephalic and atraumatic.   Eyes: Conjunctivae and EOM are normal. Pupils are equal, round, and reactive to light.   Neck: Normal range of motion. Neck supple.   Cardiovascular: Normal rate and intact distal pulses.   Pulmonary/Chest: No respiratory distress. He has no wheezes. He exhibits no tenderness.   Abdominal: Soft. Bowel sounds are normal. He exhibits no distension. There is no tenderness. There is no rebound.   Musculoskeletal: Normal range of motion.        Hands:  Neurological: He is alert and oriented to person, place, and time. He has normal strength. No sensory deficit.   Skin: Skin is warm and dry. Capillary refill takes less than 2 seconds. Abscess noted. No rash noted. There is erythema.   <1cm abscess/cyst currently draining with minimal erythema. Full ROM and sensation intact.   Psychiatric: He has a normal mood and affect.         ED Course   Procedures  Labs Reviewed - No data to display          Medical Decision Making:   History:   Old Medical Records: I decided to obtain old medical records.  Old Records Summarized: records from clinic visits.  Initial Assessment:   43-year-old male with medical history of hypertension, gout, neuropathy presents the ED with left pinky finger pain. "bump" has been there for years but it began oozing clear drainage yesterday.   Differential Diagnosis:   DDX includes but is not limited to abscess, cellulitis, cyst, dermatitis.  ED Management:  We'll discharge him with Bactroban cream and  encouraged warm soaks. Discharged to home in stable condition, return to ED warnings given, follow up " and patient care instructions given.    I have discussed the treatment and management of this patient with my supervisory physician, and we agree on the plan of care.                 Attending Attestation:     Physician Attestation Statement for NP/PA:   I reviewed the chart but I did not personally examine the patient. The face to face encounter was performed by the NP/PA.    Other NP/PA Attestation Additions:    History of Present Illness: 43-year-old white male with history of multiple ED visits presents for evaluation of increased swelling and tenderness over a bump which presents on his fingers for several years.  Pertinent in addition patient has had the wound I indeed in the past.  Today there is no new injury no fever but history of a questionable clear drainage with had occurred prior to presentation.   Physical Exam: Nonfluctuant crusted indurated area   Medical Decision Making: Examination reveals probable non-healing subcutaneous cutaneous cyst without cellulitis.  Patient will be treated with local Bactroban therapy.                    Clinical Impression:   The encounter diagnosis was Cellulitis of finger of left hand.    Disposition:   Disposition: Discharged  Condition: Stable                        Ana Rosa Persaud PA-C  04/04/18 2560

## 2018-04-13 ENCOUNTER — HOSPITAL ENCOUNTER (EMERGENCY)
Facility: HOSPITAL | Age: 43
Discharge: HOME | End: 2018-04-13
Attending: EMERGENCY MEDICINE
Payer: MEDICAID

## 2018-04-13 VITALS
DIASTOLIC BLOOD PRESSURE: 85 MMHG | TEMPERATURE: 98 F | HEART RATE: 85 BPM | RESPIRATION RATE: 16 BRPM | OXYGEN SATURATION: 99 % | SYSTOLIC BLOOD PRESSURE: 176 MMHG | WEIGHT: 180 LBS | BODY MASS INDEX: 25.83 KG/M2

## 2018-04-13 DIAGNOSIS — M10.9 ACUTE GOUT OF LEFT HAND, UNSPECIFIED CAUSE: Primary | ICD-10-CM

## 2018-04-13 DIAGNOSIS — I10 HTN (HYPERTENSION): ICD-10-CM

## 2018-04-13 LAB
ALBUMIN SERPL BCP-MCNC: 3.7 G/DL
ALP SERPL-CCNC: 145 U/L
ALT SERPL W/O P-5'-P-CCNC: 9 U/L
ANION GAP SERPL CALC-SCNC: 9 MMOL/L
AST SERPL-CCNC: 19 U/L
BASOPHILS # BLD AUTO: 0.07 K/UL
BASOPHILS NFR BLD: 1.5 %
BILIRUB SERPL-MCNC: 0.2 MG/DL
BUN SERPL-MCNC: 16 MG/DL
CALCIUM SERPL-MCNC: 9 MG/DL
CHLORIDE SERPL-SCNC: 105 MMOL/L
CO2 SERPL-SCNC: 24 MMOL/L
CREAT SERPL-MCNC: 1.7 MG/DL
DIFFERENTIAL METHOD: ABNORMAL
EOSINOPHIL # BLD AUTO: 0.2 K/UL
EOSINOPHIL NFR BLD: 5.1 %
ERYTHROCYTE [DISTWIDTH] IN BLOOD BY AUTOMATED COUNT: 17.6 %
EST. GFR  (AFRICAN AMERICAN): 55.5 ML/MIN/1.73 M^2
EST. GFR  (NON AFRICAN AMERICAN): 48 ML/MIN/1.73 M^2
GLUCOSE SERPL-MCNC: 91 MG/DL
HCT VFR BLD AUTO: 29.4 %
HGB BLD-MCNC: 8.4 G/DL
IMM GRANULOCYTES # BLD AUTO: 0.03 K/UL
IMM GRANULOCYTES NFR BLD AUTO: 0.6 %
LACTATE SERPL-SCNC: 0.8 MMOL/L
LYMPHOCYTES # BLD AUTO: 1.1 K/UL
LYMPHOCYTES NFR BLD: 24.2 %
MCH RBC QN AUTO: 20.4 PG
MCHC RBC AUTO-ENTMCNC: 28.6 G/DL
MCV RBC AUTO: 72 FL
MONOCYTES # BLD AUTO: 0.5 K/UL
MONOCYTES NFR BLD: 10.4 %
NEUTROPHILS # BLD AUTO: 2.8 K/UL
NEUTROPHILS NFR BLD: 58.2 %
NRBC BLD-RTO: 0 /100 WBC
PLATELET # BLD AUTO: 190 K/UL
PMV BLD AUTO: ABNORMAL FL
POTASSIUM SERPL-SCNC: 4.3 MMOL/L
PROT SERPL-MCNC: 7 G/DL
RBC # BLD AUTO: 4.11 M/UL
SODIUM SERPL-SCNC: 138 MMOL/L
URATE SERPL-MCNC: 9.7 MG/DL
WBC # BLD AUTO: 4.72 K/UL

## 2018-04-13 PROCEDURE — 99284 EMERGENCY DEPT VISIT MOD MDM: CPT | Mod: ,,, | Performed by: EMERGENCY MEDICINE

## 2018-04-13 PROCEDURE — 85025 COMPLETE CBC W/AUTO DIFF WBC: CPT

## 2018-04-13 PROCEDURE — 80053 COMPREHEN METABOLIC PANEL: CPT

## 2018-04-13 PROCEDURE — 25000003 PHARM REV CODE 250: Performed by: PHYSICIAN ASSISTANT

## 2018-04-13 PROCEDURE — 84550 ASSAY OF BLOOD/URIC ACID: CPT

## 2018-04-13 PROCEDURE — 83605 ASSAY OF LACTIC ACID: CPT

## 2018-04-13 PROCEDURE — 99284 EMERGENCY DEPT VISIT MOD MDM: CPT

## 2018-04-13 RX ORDER — HYDROCODONE BITARTRATE AND ACETAMINOPHEN 5; 325 MG/1; MG/1
1 TABLET ORAL
Status: COMPLETED | OUTPATIENT
Start: 2018-04-13 | End: 2018-04-13

## 2018-04-13 RX ORDER — SULFAMETHOXAZOLE AND TRIMETHOPRIM 800; 160 MG/1; MG/1
1 TABLET ORAL
COMMUNITY
End: 2019-01-18

## 2018-04-13 RX ORDER — COLCHICINE 0.6 MG/1
0.6 TABLET, FILM COATED ORAL
Status: COMPLETED | OUTPATIENT
Start: 2018-04-13 | End: 2018-04-13

## 2018-04-13 RX ORDER — LIDOCAINE HYDROCHLORIDE 10 MG/ML
1 INJECTION INFILTRATION; PERINEURAL
Status: COMPLETED | OUTPATIENT
Start: 2018-04-13 | End: 2018-04-13

## 2018-04-13 RX ORDER — CEPHALEXIN 500 MG/1
500 CAPSULE ORAL EVERY 6 HOURS
Qty: 40 CAPSULE | Refills: 0 | Status: SHIPPED | OUTPATIENT
Start: 2018-04-13 | End: 2018-04-23

## 2018-04-13 RX ADMIN — LIDOCAINE HYDROCHLORIDE 1 ML: 10 INJECTION, SOLUTION INFILTRATION; PERINEURAL at 02:04

## 2018-04-13 RX ADMIN — COLCHICINE 0.6 MG: 0.6 TABLET, FILM COATED ORAL at 02:04

## 2018-04-13 RX ADMIN — HYDROCODONE BITARTRATE AND ACETAMINOPHEN 1 TABLET: 5; 325 TABLET ORAL at 03:04

## 2018-04-13 NOTE — ED PROVIDER NOTES
"Encounter Date: 4/13/2018       History     Chief Complaint   Patient presents with    finger swelling     left middle finger red and swollen     Mr Mitchell is a 43YOWM who presents with suspected acute gout flare in DIP of 3rd digit in L hand, pertinent PMHx gout, HTN, neuropathy. Pt states he visited [facility] yesterday, where they completed workup with plain films, Dx of gout flare and d/c on Bactrim (no EPIC records). Pt returns to ED due to increased pain and swelling of digit. He reports spontaneous discharge of "white clumps". Digit is very TTP and has limited ROM 2/2 pain status. He also reports history of osteomyelitis several years prior in same digit which required IV antibiotics and surgery. He is taking colchicine and allopurinol as indicated for gout. Unable to take NSAIDS d/t "stomach bleed history". He denies abdominal pain, fevers, chills, recent URI, skin break to finger, pain in hand or LUE, crush injury, headache, AMS.          Review of patient's allergies indicates:   Allergen Reactions    Nsaids (non-steroidal anti-inflammatory drug)      Hx of GI bleed     Past Medical History:   Diagnosis Date    Arthritis 10/11/2013    Blood transfusion     Cellulitis     GERD (gastroesophageal reflux disease)     GI bleed     Gout attack     Hypertension     Neuropathy     Osteomyelitis      Past Surgical History:   Procedure Laterality Date    CYST REMOVAL      INCISION AND DRAINAGE OF WOUND      left hand third finger    SURERY ON LEFT MIDDLE FINGER      OSTEOMYLITIS     Family History   Problem Relation Age of Onset    Heart disease Mother     Diabetes Mother     Kidney disease Mother     Hypertension Mother     Stroke Mother     Cancer Mother      Social History   Substance Use Topics    Smoking status: Never Smoker    Smokeless tobacco: Never Used    Alcohol use No     Review of Systems   Constitutional: Negative for chills, diaphoresis, fatigue and fever.   HENT: Negative for " congestion, rhinorrhea, sinus pain and sinus pressure.    Eyes: Negative for photophobia and visual disturbance.   Respiratory: Negative for cough and shortness of breath.    Cardiovascular: Negative for chest pain and palpitations.   Gastrointestinal: Negative for abdominal distention and abdominal pain.   Genitourinary: Negative for dysuria, flank pain, frequency and hematuria.   Musculoskeletal: Positive for arthralgias (DIP of 3rd digit on L hand). Negative for myalgias.   Skin: Positive for wound (DIP of 3rd digit on L hand). Negative for color change.   Neurological: Negative for dizziness, weakness and headaches.   Psychiatric/Behavioral: Negative for agitation and decreased concentration. The patient is not nervous/anxious.        Physical Exam     Initial Vitals [04/13/18 1221]   BP Pulse Resp Temp SpO2   (!) 151/72 88 16 98.1 °F (36.7 °C) 98 %      MAP       98.33         Physical Exam    Nursing note and vitals reviewed.  Constitutional: He appears well-developed and well-nourished. He is not diaphoretic. No distress.   HENT:   Head: Normocephalic and atraumatic.   Right Ear: External ear normal.   Left Ear: External ear normal.   Eyes: Conjunctivae and EOM are normal. Pupils are equal, round, and reactive to light.   Neck: Normal range of motion.   Cardiovascular: Normal rate, regular rhythm, normal heart sounds and intact distal pulses.   Pulmonary/Chest: Breath sounds normal.   Abdominal: Soft. Bowel sounds are normal.   Musculoskeletal: Normal range of motion.        Hands:  3rd digit DIP of L hand with substantial circumferential swelling and erythema most concentrated on dorsal side of joint consistent with acute gout flare. Small laceration on dorsum of joint consistent with I&D completed at [facility] yesterday. Gouty tophi excreted with gentle palapation. No spreading erythema noted, no streaking present.    Neurological: He is alert and oriented to person, place, and time. He has normal strength.    Skin: Skin is warm and dry.   Psychiatric: Thought content normal. His affect is blunt. He is withdrawn. Cognition and memory are normal.   Flat affect. Patient is slouched over and does not make eye contact. A&Ox3 and responds to every question. He is attentive.         ED Course   Procedures  Labs Reviewed   CBC W/ AUTO DIFFERENTIAL   COMPREHENSIVE METABOLIC PANEL   URIC ACID   LACTIC ACID, PLASMA        Imaging Results          X-Ray Finger 2 or More Views Left (Final result)  Result time 04/13/18 16:09:17    Final result by Shasha David MD (04/13/18 16:09:17)                 Impression:      See above      Electronically signed by: Shasha David MD  Date:    04/13/2018  Time:    16:09             Narrative:    EXAMINATION:  XR FINGER 2 OR MORE VIEWS LEFT    CLINICAL HISTORY:  gout/cellulitis of DIP L 3rd digit;    TECHNIQUE:  Three views of the left 3rd digit were obtained.    COMPARISON:  Prior hand radiograph dated 03/21/2017    FINDINGS:  There is joint space narrowing erosion and soft tissue swelling involving the 3rd DIP joint.  Differential considerations include gout, erosive arthritis or joint infection.  Erosive change appears slightly worse compared to previous exam.                              Labs Reviewed   CBC W/ AUTO DIFFERENTIAL - Abnormal; Notable for the following:        Result Value    RBC 4.11 (*)     Hemoglobin 8.4 (*)     Hematocrit 29.4 (*)     MCV 72 (*)     MCH 20.4 (*)     MCHC 28.6 (*)     RDW 17.6 (*)     Immature Granulocytes 0.6 (*)     All other components within normal limits   COMPREHENSIVE METABOLIC PANEL - Abnormal; Notable for the following:     Creatinine 1.7 (*)     Alkaline Phosphatase 145 (*)     ALT 9 (*)     eGFR if  55.5 (*)     eGFR if non  48.0 (*)     All other components within normal limits   URIC ACID - Abnormal; Notable for the following:     Uric Acid 9.7 (*)     All other components within normal limits   LACTIC  ACID, PLASMA          Medical Decision Making:   Initial Assessment:   Pt presents s/p visit to [place] with increased pain and swelling to suspected gout of DIP 3rd digit of L hand.  Differential Diagnosis:   DDX acute gout flare, cellulitis, extensor tendon synovitis, osteomyelitis. Physical exam and history taking decrease clinical suspicion for fracture, arterial emboli, venous thrombosis.  ED Management:  Patient given colchicine as indicated for gout attack in addition to [place]. Ring block completed for imaging purposes and in preparation of possible I&D. Ultrasound does not reveal abscess or acute abnormality of flexor/extensor tendons. Previous I&D site palpated with moderate amount of gouty tophi discharge. Plain films reveal acute joint space inflammation consistent with clinical course of gout. No second I&D indicated at this time due to ultrasound and plain film findings. Labs reveal increase uric acid consistent with gout flare diagnosis. Anemia continues to be present but is moderately improved; no leukocytosis. Keflex Rx added on to enhance strep coverage to decrease incidence of secondary infection. Patient discharged in stable condition and given strict ED return instruction. He agreed to plan of care and voiced understanding.                        Clinical Impression:   The encounter diagnosis was Acute gout of left hand, unspecified cause.    Disposition:   Disposition: Discharged  Condition: Stable       Kathe Mackay PA-C  04/13/2018                     Kathe Mackay PA-C  04/13/18 2059

## 2018-04-13 NOTE — ED TRIAGE NOTES
Presents to ER with pain, redness and swelling to the distal knuckle of his left middle finger.  States that he was seen at [facility] and the area was incised and drained yesterday but the area keeps getting worse.  Pt identifiers checked and correct    LOC:   · The pt is awake, alert, and aware of environment with an appropriate affect,  as well as speaking appropriately; AAOx3 to person, place, and situation  APPEARANCE:   · Pt resting comfortably and in no acute distress; clean and well groomed  SKIN:   · Skin is warm and dry; color consistent with ethnicity; pt has normal skin turgor and moist mucus membranes  · Skin intact w/ no breakdown or brusing noted  MUSCULOSKELETAL:         Ambulates independently  RESPIRATORY:   · Airway is open and patent; respirations are spontaneous; normal effort and rate noted; absent of accessory-muscle use  · Breath sounds auscultated in all lung fields are noted to be clear and absent of adventitious sounds  CARDIAC:   · Pt denies chest pain; normal heart sounds auscultated; Pt has no peripheral edema noted; capillary refill < 3 seconds  · 2+ pulses palpated in all extremities   ABDOMEN:   · Soft and non-tender to palpation; no abnormal distention noted/reported; bowel sounds present x 4  NEUROLOGIC:   · PERRL, 3mm bilaterally, eyes open spontaneously; pt follows commands; facial expression symmetrical; equal hand  bilaterally; purposeful motor response noted  · Normal sensation to touch reported in distal region of all extremities

## 2018-04-13 NOTE — DISCHARGE INSTRUCTIONS
Take additional antibiotic to prevent subsequent infection of finger.Take colchicine as needed for gout attack management. Keep incision site of prior drainage covered, wash hands frequently and use Neosporin or Polysporin to decrease infection. Return to the ED immediately if you develop fevers, chills, experience swelling or increased pain to left hand, if symptoms worsen or new symptoms occur.    Our goal in the emergency department is to always give you outstanding care and exceptional service. You may receive a survey by mail or e-mail in the next week regarding your experience in our ED. We would greatly appreciate your completing and returning the survey. Your feedback provides us with a way to recognize our staff who give very good care and it helps us learn how to improve when your experience was below our aspiration of excellence.

## 2018-04-13 NOTE — ED NOTES
All discharge instructions reviewed with patient and questions addressed. VSS, NAD noted, and patient A&Ox4. All belongings with patient at time of departure. Patient ambulating from department without difficulty.

## 2018-04-19 ENCOUNTER — HOSPITAL ENCOUNTER (EMERGENCY)
Facility: OTHER | Age: 43
Discharge: HOME | End: 2018-04-19
Attending: EMERGENCY MEDICINE
Payer: MEDICAID

## 2018-04-19 VITALS
SYSTOLIC BLOOD PRESSURE: 144 MMHG | TEMPERATURE: 98 F | HEART RATE: 94 BPM | DIASTOLIC BLOOD PRESSURE: 71 MMHG | RESPIRATION RATE: 16 BRPM | OXYGEN SATURATION: 98 % | HEIGHT: 70 IN | BODY MASS INDEX: 26.48 KG/M2 | WEIGHT: 185 LBS

## 2018-04-19 DIAGNOSIS — M10.9 GOUT OF LEFT HAND, UNSPECIFIED CAUSE, UNSPECIFIED CHRONICITY: Primary | ICD-10-CM

## 2018-04-19 PROCEDURE — 99283 EMERGENCY DEPT VISIT LOW MDM: CPT | Mod: 25

## 2018-04-19 PROCEDURE — 63600175 PHARM REV CODE 636 W HCPCS: Performed by: EMERGENCY MEDICINE

## 2018-04-19 PROCEDURE — 96372 THER/PROPH/DIAG INJ SC/IM: CPT

## 2018-04-19 RX ORDER — PREDNISONE 50 MG/1
50 TABLET ORAL DAILY
Qty: 5 TABLET | Refills: 0 | Status: SHIPPED | OUTPATIENT
Start: 2018-04-19 | End: 2018-04-24

## 2018-04-19 RX ORDER — PREDNISONE 20 MG/1
60 TABLET ORAL
Status: COMPLETED | OUTPATIENT
Start: 2018-04-19 | End: 2018-04-19

## 2018-04-19 RX ORDER — KETOROLAC TROMETHAMINE 30 MG/ML
15 INJECTION, SOLUTION INTRAMUSCULAR; INTRAVENOUS
Status: COMPLETED | OUTPATIENT
Start: 2018-04-19 | End: 2018-04-19

## 2018-04-19 RX ADMIN — PREDNISONE 60 MG: 20 TABLET ORAL at 04:04

## 2018-04-19 RX ADMIN — KETOROLAC TROMETHAMINE 15 MG: 30 INJECTION, SOLUTION INTRAMUSCULAR at 04:04

## 2018-04-19 NOTE — ED PROVIDER NOTES
Encounter Date: 4/19/2018    SCRIBE #1 NOTE: ISheila, am scribing for, and in the presence of, Dr. Lau.       History     Chief Complaint   Patient presents with    Hand Pain     Pt has a red and swollen right 3rd finger; was seen at [facility] and is getting worse     Time seen by provider: 4:13 AM    This is a 43 y.o. male, with a history of arthritis and gout, who presents with complaint of finger pain that worsened one week ago. Patient reports associated pain, swelling, and discharge to the left 3 rd digit. Pain worsens with movement. He denies fever, chills, shortness of breath, nausea, vomiting, or numbness. He is currently being treatment with antibiotic and indomethacin. He denies being seen by rheumatology.       The history is provided by the patient.     Review of patient's allergies indicates:   Allergen Reactions    Nsaids (non-steroidal anti-inflammatory drug)      Hx of GI bleed     Past Medical History:   Diagnosis Date    Arthritis 10/11/2013    Blood transfusion     Cellulitis     GERD (gastroesophageal reflux disease)     GI bleed     Gout attack     Hypertension     Neuropathy     Osteomyelitis      Past Surgical History:   Procedure Laterality Date    CYST REMOVAL      INCISION AND DRAINAGE OF WOUND      left hand third finger    SURERY ON LEFT MIDDLE FINGER      OSTEOMYLITIS     Family History   Problem Relation Age of Onset    Heart disease Mother     Diabetes Mother     Kidney disease Mother     Hypertension Mother     Stroke Mother     Cancer Mother      Social History   Substance Use Topics    Smoking status: Never Smoker    Smokeless tobacco: Never Used    Alcohol use No     Review of Systems   Constitutional: Negative for chills and fever.   HENT: Negative for congestion and sore throat.    Respiratory: Negative for cough and shortness of breath.    Cardiovascular: Negative for chest pain.   Gastrointestinal: Negative for abdominal pain, nausea and vomiting.    Genitourinary: Negative for dysuria.   Musculoskeletal: Positive for joint swelling. Negative for back pain.        Positive for left 3 rd digit pain.    Skin: Positive for color change. Negative for rash.        Positive for redness and discharge to left 3 rd digit.    Neurological: Negative for weakness and numbness.   Hematological: Does not bruise/bleed easily.   Psychiatric/Behavioral: Negative for confusion.       Physical Exam     Initial Vitals [04/19/18 0332]   BP Pulse Resp Temp SpO2   (!) 144/71 94 16 98.4 °F (36.9 °C) 98 %      MAP       95.33         Physical Exam    Nursing note and vitals reviewed.  Constitutional: He appears well-developed and well-nourished. He is not diaphoretic. No distress.   HENT:   Head: Normocephalic and atraumatic.   Right Ear: External ear normal.   Left Ear: External ear normal.   Eyes: EOM are normal. Right eye exhibits no discharge. Left eye exhibits no discharge.   Neck: Normal range of motion.   Cardiovascular: Normal rate, regular rhythm and normal heart sounds. Exam reveals no gallop and no friction rub.    No murmur heard.  Pulmonary/Chest: Breath sounds normal. No respiratory distress. He has no wheezes. He has no rhonchi. He has no rales.   Abdominal: Soft. There is no tenderness. There is no rebound and no guarding.   Musculoskeletal: Normal range of motion.   No swelling or tenderness over flexor tendons.    Neurological: He is alert and oriented to person, place, and time.   Skin: Skin is warm and dry. No rash noted.   Left 3 rd digit at DIP is diffusely swollen, erythematous, and tender on the radial aspect of the dorsum draining tophaceous material. No foul odor. No purulence. Other digits unaffected.    Psychiatric: He has a normal mood and affect. His behavior is normal. Judgment and thought content normal.         ED Course   Procedures  Labs Reviewed - No data to display          Medical Decision Making:   ED Management:  Patient with frequent  "presentations (15 in the last 6 months to [facility] emergency departments alone) comes in complaining of finger swelling.  This is a frequent complaint.  I have evaluated the past.  Patient, gave history of gout, also with reports a life, also reported osteomyelitis.  I reviewed his recent evaluations.  Several evaluations for this complaint in the past several weeks.  Currently being treated for gout.  Also started on antibiotics to prevent "superinfection ".  Patient reports now that he is not improving.  On exam he does have significant swelling only over the DIP of the third finger.  No pain or tenderness over the flexor tendon to suggest flexor tenosynovitis.  Part of the swelling is actively draining, and I am able to express chalky white substance consistent with tophaceous material.  There are no findings of purulence.  There is no fluctuance or induration to suggest infection.  I review his recent imaging which shows worsening bony degeneration.  All this is very consistent with gout, as is his recent elevated uric acid level.  As he has not improved on colchicine and cannot take indomethacin, I will start him on steroids.  He is already on antibiotics and I  encouraged him to continue taking them. I've also expressly cautioned him that steroids would worsen an infection, so if he has worsening symptoms he'll need to return immediately for reevaluation.  At this time however I can find no signs of cellulitis, or abscess.  I have also encouraged follow-up with primary care, who is planning MRI to evaluate for osteomyelitis.  He is afebrile with no systemic symptoms at this time.    I did have an extensive talk regarding signs to return for and need for follow up. Patient expressed understanding and will monitor symptoms closely and follow-up as needed.    DANIEL Lau M.D.  04/19/2018  6:31 AM                Attending Attestation:           Physician Attestation for Scribe:  Physician Attestation " Statement for Scribe #1: I, Dr. Lau, reviewed documentation, as scribed by Sheila Kessler in my presence, and it is both accurate and complete.                    Clinical Impression:     1. Gout of left hand, unspecified cause, unspecified chronicity                                 Carlos Lau MD  04/19/18 0654

## 2018-04-19 NOTE — DISCHARGE INSTRUCTIONS
At this time there is no sign of infection.  The material coming out of the swollen finger is very consistent with gout.  The steroids should improve this.  Steroids however can worsen an infection.  So if you do not see improvement on the prednisone, and/or if things get worse, return immediately for reevaluation.

## 2018-05-14 ENCOUNTER — HOSPITAL ENCOUNTER (EMERGENCY)
Facility: HOSPITAL | Age: 43
Discharge: HOME | End: 2018-05-14
Attending: FAMILY MEDICINE
Payer: MEDICAID

## 2018-05-14 VITALS
RESPIRATION RATE: 16 BRPM | OXYGEN SATURATION: 100 % | HEIGHT: 70 IN | SYSTOLIC BLOOD PRESSURE: 188 MMHG | HEART RATE: 80 BPM | WEIGHT: 182 LBS | DIASTOLIC BLOOD PRESSURE: 92 MMHG | TEMPERATURE: 98 F | BODY MASS INDEX: 26.05 KG/M2

## 2018-05-14 DIAGNOSIS — M10.9 GOUTY ARTHRITIS: Primary | ICD-10-CM

## 2018-05-14 LAB
ALBUMIN SERPL BCP-MCNC: 3.1 G/DL
ALP SERPL-CCNC: 124 U/L
ALT SERPL W/O P-5'-P-CCNC: 9 U/L
ANION GAP SERPL CALC-SCNC: 10 MMOL/L
ANISOCYTOSIS BLD QL SMEAR: SLIGHT
AST SERPL-CCNC: 10 U/L
BASOPHILS # BLD AUTO: 0 K/UL
BASOPHILS NFR BLD: 0 %
BILIRUB SERPL-MCNC: 0.2 MG/DL
BUN SERPL-MCNC: 21 MG/DL
BURR CELLS BLD QL SMEAR: ABNORMAL
CALCIUM SERPL-MCNC: 8.1 MG/DL
CHLORIDE SERPL-SCNC: 112 MMOL/L
CO2 SERPL-SCNC: 21 MMOL/L
CREAT SERPL-MCNC: 1.3 MG/DL
CRP SERPL-MCNC: 2.3 MG/L
DIFFERENTIAL METHOD: ABNORMAL
EOSINOPHIL # BLD AUTO: 0 K/UL
EOSINOPHIL NFR BLD: 0 %
ERYTHROCYTE [DISTWIDTH] IN BLOOD BY AUTOMATED COUNT: 17.1 %
ERYTHROCYTE [SEDIMENTATION RATE] IN BLOOD BY WESTERGREN METHOD: 20 MM/HR
EST. GFR  (AFRICAN AMERICAN): >60 ML/MIN/1.73 M^2
EST. GFR  (NON AFRICAN AMERICAN): >60 ML/MIN/1.73 M^2
GLUCOSE SERPL-MCNC: 118 MG/DL
HCT VFR BLD AUTO: 31.7 %
HGB BLD-MCNC: 9 G/DL
HYPOCHROMIA BLD QL SMEAR: ABNORMAL
IMM GRANULOCYTES # BLD AUTO: 0.03 K/UL
IMM GRANULOCYTES NFR BLD AUTO: 0.4 %
LACTATE SERPL-SCNC: 1.9 MMOL/L
LACTATE SERPL-SCNC: 2.4 MMOL/L
LYMPHOCYTES # BLD AUTO: 0.6 K/UL
LYMPHOCYTES NFR BLD: 8 %
MCH RBC QN AUTO: 20.4 PG
MCHC RBC AUTO-ENTMCNC: 28.4 G/DL
MCV RBC AUTO: 72 FL
MONOCYTES # BLD AUTO: 0.2 K/UL
MONOCYTES NFR BLD: 2.8 %
NEUTROPHILS # BLD AUTO: 6.7 K/UL
NEUTROPHILS NFR BLD: 88.8 %
NRBC BLD-RTO: 0 /100 WBC
OVALOCYTES BLD QL SMEAR: ABNORMAL
PLATELET # BLD AUTO: 223 K/UL
PLATELET BLD QL SMEAR: ABNORMAL
PMV BLD AUTO: ABNORMAL FL
POIKILOCYTOSIS BLD QL SMEAR: SLIGHT
POLYCHROMASIA BLD QL SMEAR: ABNORMAL
POTASSIUM SERPL-SCNC: 3.7 MMOL/L
PROT SERPL-MCNC: 6.2 G/DL
RBC # BLD AUTO: 4.41 M/UL
SCHISTOCYTES BLD QL SMEAR: ABNORMAL
SCHISTOCYTES BLD QL SMEAR: PRESENT
SODIUM SERPL-SCNC: 143 MMOL/L
URATE SERPL-MCNC: 6.5 MG/DL
WBC # BLD AUTO: 7.51 K/UL

## 2018-05-14 PROCEDURE — 84550 ASSAY OF BLOOD/URIC ACID: CPT

## 2018-05-14 PROCEDURE — 85651 RBC SED RATE NONAUTOMATED: CPT

## 2018-05-14 PROCEDURE — 87040 BLOOD CULTURE FOR BACTERIA: CPT

## 2018-05-14 PROCEDURE — 25000003 PHARM REV CODE 250: Performed by: PHYSICIAN ASSISTANT

## 2018-05-14 PROCEDURE — 85025 COMPLETE CBC W/AUTO DIFF WBC: CPT

## 2018-05-14 PROCEDURE — 83605 ASSAY OF LACTIC ACID: CPT

## 2018-05-14 PROCEDURE — 99284 EMERGENCY DEPT VISIT MOD MDM: CPT | Mod: ,,, | Performed by: PHYSICIAN ASSISTANT

## 2018-05-14 PROCEDURE — 86140 C-REACTIVE PROTEIN: CPT

## 2018-05-14 PROCEDURE — 80053 COMPREHEN METABOLIC PANEL: CPT

## 2018-05-14 PROCEDURE — 99284 EMERGENCY DEPT VISIT MOD MDM: CPT | Mod: 25

## 2018-05-14 RX ORDER — HYDROCODONE BITARTRATE AND ACETAMINOPHEN 5; 325 MG/1; MG/1
1 TABLET ORAL
Status: COMPLETED | OUTPATIENT
Start: 2018-05-14 | End: 2018-05-14

## 2018-05-14 RX ORDER — METHYLPREDNISOLONE 4 MG/1
TABLET ORAL
Qty: 1 PACKAGE | Refills: 0 | Status: SHIPPED | OUTPATIENT
Start: 2018-05-14 | End: 2018-06-04

## 2018-05-14 RX ADMIN — HYDROCODONE BITARTRATE AND ACETAMINOPHEN 1 TABLET: 5; 325 TABLET ORAL at 05:05

## 2018-05-14 NOTE — ED NOTES
"Pt c/o "left 3rd digit red and swollen for 3 weeks and getting worse, saw PCP and told it was gout and given medicine for it and bactrim incase it was infected"    LOC: The patient is awake, alert and aware of environment with an appropriate affect, the patient is oriented x 3 and speaking appropriately.  APPEARANCE: Patient resting comfortably and in no acute distress, patient is clean and well groomed, patient's clothing is properly fastened.  SKIN: The skin is warm and dry, intact, patient has normal skin turgor and moist mucus membranes.   RESPIRATORY: Airway is open and patent, respirations are spontaneous, patient has a normal effort and rate.  CARDIAC: Patient has a normal rate and rhythm, no periphreal edema noted, capillary refill < 3 seconds. Bilateral radial pulses +2  ABDOMEN: Soft and non tender to palpation, no distention noted. Bowel sounds present  NEUROLOGIC: PERRL, facial expression is symmetrical, patient moving all extremities spontaneously, normal sensation in all extremities when touched with a finger. Follows all commands appropriately  MUSCULOSKELETAL: No obvious deformities. Full ROM in all 4 extremities.   "

## 2018-05-14 NOTE — PROVIDER PROGRESS NOTES - EMERGENCY DEPT.
Encounter Date: 5/14/2018    ED Physician Progress Notes        Physician Note:   I, Viviana Herrmann, am scribing for, and in the presence of, Dr. Mendez. I performed the above scribed service and the documentation accurately describes the services I performed. I attest to the accuracy of the note.     Patient with swelling and erythema to left middle finger DIP joint with swelling and nodules. The patient has been seen for this in the past and now was erythema. Finger is swollen. May need evaluation with labs, X-ray, and potential orthopedics consult. Will send through intake.     I initially evaluated this patient and ordered workup while in intake.  The patient will receive a full evaluation in an ED pod when space is available.  All results from tests ordered in intake will not be followed by the intake team, including myself. All results will be followed by the ED Pod team.

## 2018-05-14 NOTE — ED PROVIDER NOTES
Encounter Date: 5/14/2018       History     Chief Complaint   Patient presents with    Hand Pain     L finger pain with pain and redness, seen here  2 weeks ago     Patient is a 43-year-old male with a history of gout, osteomyelitis, hypertension, neuropathy is presenting to the ER for evaluation of hand pain.  Patient states that he has had redness and swelling to his 3rd left digit on and off for many months now.  He states that since Friday symptoms have worsened.   He states that he did notice thick white discharge from the finger when he expressed it a couple days ago. He was seen at family doctor's office and was prescribed Bactrim that he is currently taking.  Patient states that he was diagnosed with gout to that finger and is currently on colchicine and allopurinol.  He states that the symptoms have not improved.  Patient denies any injury or trauma. No paresthesia or focal weakness.  No fever chills at home.  The patient does have an appointment with rheumatology in June.  He has been taking Norco with some alleviation of his pain at home.  The patient states that the site was drained about 3 weeks ago.      The history is provided by the patient.     Review of patient's allergies indicates:   Allergen Reactions    Nsaids (non-steroidal anti-inflammatory drug)      Hx of GI bleed     Past Medical History:   Diagnosis Date    Arthritis 10/11/2013    Blood transfusion     Cellulitis     GERD (gastroesophageal reflux disease)     GI bleed     Gout attack     Hypertension     Neuropathy     Osteomyelitis      Past Surgical History:   Procedure Laterality Date    CYST REMOVAL      INCISION AND DRAINAGE OF WOUND      left hand third finger    SURERY ON LEFT MIDDLE FINGER      OSTEOMYLITIS     Family History   Problem Relation Age of Onset    Heart disease Mother     Diabetes Mother     Kidney disease Mother     Hypertension Mother     Stroke Mother     Cancer Mother      Social History    Substance Use Topics    Smoking status: Never Smoker    Smokeless tobacco: Never Used    Alcohol use No     Review of Systems   Constitutional: Negative for chills and fever.   Musculoskeletal: Positive for arthralgias and joint swelling.   Skin: Positive for color change. Negative for wound.   Allergic/Immunologic: Negative for immunocompromised state.   Neurological: Negative for weakness and numbness.   Hematological: Does not bruise/bleed easily.   Psychiatric/Behavioral: Negative for confusion.       Physical Exam     Initial Vitals [05/14/18 1525]   BP Pulse Resp Temp SpO2   (!) 186/90 99 18 98.3 °F (36.8 °C) 98 %      MAP       122         Physical Exam    Constitutional: He appears well-developed and well-nourished. He is not diaphoretic. No distress.   HENT:   Head: Normocephalic and atraumatic.   Eyes: Conjunctivae and EOM are normal.   Cardiovascular: Normal rate, regular rhythm and normal heart sounds.   Pulmonary/Chest: Breath sounds normal.   Musculoskeletal:        Hands:  Neurological: He is alert and oriented to person, place, and time.   Skin: Skin is warm and dry.         ED Course   Procedures  Labs Reviewed   CBC W/ AUTO DIFFERENTIAL - Abnormal; Notable for the following:        Result Value    RBC 4.41 (*)     Hemoglobin 9.0 (*)     Hematocrit 31.7 (*)     MCV 72 (*)     MCH 20.4 (*)     MCHC 28.4 (*)     RDW 17.1 (*)     Lymph # 0.6 (*)     Mono # 0.2 (*)     Gran% 88.8 (*)     Lymph% 8.0 (*)     Mono% 2.8 (*)     All other components within normal limits   COMPREHENSIVE METABOLIC PANEL - Abnormal; Notable for the following:     Chloride 112 (*)     CO2 21 (*)     Glucose 118 (*)     BUN, Bld 21 (*)     Calcium 8.1 (*)     Albumin 3.1 (*)     ALT 9 (*)     All other components within normal limits   LACTIC ACID, PLASMA - Abnormal; Notable for the following:     Lactate (Lactic Acid) 2.4 (*)     All other components within normal limits   SEDIMENTATION RATE, MANUAL - Abnormal; Notable for  the following:     Sed Rate 20 (*)     All other components within normal limits   CULTURE, BLOOD   CULTURE, BLOOD   C-REACTIVE PROTEIN   URIC ACID   LACTIC ACID, PLASMA    Narrative:     After one L IV fluid              X-Ray Finger 2 or More Views Left   Final Result      Stable appearance of the 3rd digit DIP joint which given the patient's history is likely related to gout flare-up.  Erosive arthritis or septic arthritis cannot be definitely excluded.  Correlation advised.         Electronically signed by: Tin Beltre MD   Date:    05/14/2018   Time:    18:44                APC / Resident Notes:   Patient was seen in the ER promptly upon arrival.  He is afebrile no acute distress.  Circumferential erythema to the PIP joint, 3rd digit left hand.  This does extend to the tip of the finger as well. Range of motion intact but limited.  It tophi noted.  No active purulence.  Laboratory studies show normal white count of 7.5.  Hemoglobin stable.  Chemistries unremarkable. Lactic acid initially was elevated 2.4.  After 1 L IV fluid itHas improved to 1.9.  CRP 2.3.  ESR is 20.  Uric acid was normal. X-ray of the hand obtained.   suspicious for gout flare up.    Consult Orthopedics who did come to ED and evaluated patient.  I did not feel this was septic arthritis.  This is likely flare up of gout.  Patient will be placed back on steroids.  He is currently on colchicine and allopurinol.  He is to follow up with Rheumatology.  I will also give patient information to [facility] for [facility].  Patient is otherwise stable for outpatient therapy at this time.    The care of this patient was overseen by attending physician who agrees with treatment, plan, and disposition.                ED Course as of May 14 2013   Mon May 14, 2018   3040 MPV: SEE COMMENT [AJ]      ED Course User Index  [AJ] Erika Goss PA-C     Clinical Impression:   The encounter diagnosis was Gouty arthritis.    Disposition:   Disposition:  Discharged  Condition: Stable                        Erika Goss PA-C  05/14/18 2013

## 2018-05-15 NOTE — DISCHARGE INSTRUCTIONS
Take medication as prescribed. Follow up with rheumatology as scheduled. Follow up with [facility] for appointment with Hand specialist.

## 2018-05-15 NOTE — CONSULTS
Consult Note  Orthopaedics    SUBJECTIVE:     History of Present Illness:  Patient is a 43-year-old male with a history of gout, osteomyelitis, hypertension, neuropathy is presenting to the ER for evaluation of hand pain.  Patient states that he has had redness and swelling to his left middle finger on and off for many months now.  He states that since Friday symptoms have worsened.   He states that he did notice thick white discharge from the finger when he expressed it a couple days ago. He was seen at family doctor's office and was prescribed Bactrim that he is currently taking.  Patient states that he was diagnosed with gout to that finger and is currently on colchicine and allopurinol.  He states that the symptoms have not improved.  Patient denies any injury or trauma. No paresthesia or focal weakness.  No fever chills at home.  The patient does have an appointment with rheumatology in June.  He has been taking Norco with some alleviation of his pain at home.  The patient states that the site was drained about 3 weeks ago.    Scheduled Meds:  Continuous Infusions:  PRN Meds:    Review of patient's allergies indicates:   Allergen Reactions    Nsaids (non-steroidal anti-inflammatory drug)      Hx of GI bleed       Past Medical History:   Diagnosis Date    Arthritis 10/11/2013    Blood transfusion     Cellulitis     GERD (gastroesophageal reflux disease)     GI bleed     Gout attack     Hypertension     Neuropathy     Osteomyelitis      Past Surgical History:   Procedure Laterality Date    CYST REMOVAL      INCISION AND DRAINAGE OF WOUND      left hand third finger    SURERY ON LEFT MIDDLE FINGER      OSTEOMYLITIS     Family History   Problem Relation Age of Onset    Heart disease Mother     Diabetes Mother     Kidney disease Mother     Hypertension Mother     Stroke Mother     Cancer Mother      Social History   Substance Use Topics    Smoking status: Never Smoker    Smokeless tobacco: Never  Used    Alcohol use No        Review of Systems:  Patient denies constitutional symptoms, cardiac symptoms, respiratory symptoms, GI symptoms.  The remainder of the musculoskeletal ROS is included in the HPI.      OBJECTIVE:     Vital Signs (Most Recent)  Temp: 98.3 °F (36.8 °C) (05/14/18 1525)  Pulse: 99 (05/14/18 1525)  Resp: 18 (05/14/18 1525)  BP: (!) 186/90 (05/14/18 1525)  SpO2: 98 % (05/14/18 1525)    Physical Exam:  Gen:  No acute distress  CV:  Peripherally well-perfused.  Pulses 2+ bilaterally.  Lungs:  Normal respiratory effort.  Abdomen:  Soft, non-tender, non-distended  Head/Neck:  Normocephalic.  Atraumatic. No TTP, AROM and PROM intact without pain  Neuro:  CN intact without deficit, SILT throughout B/L Upper & Lower Extremities  Pelvis: No TTP, Stable to direct anterior pressure over ASIS.    LEFT UPPER EXTREMITY:     INSPECTION  -  There is swelling of the DIP of the middle finger. There are three visible tophaceous gout.     PALPATION  -  TTP at the DIP    RANGE OF MOTION  - AROM and PROM intact at the DIP and the PIP.     NEUROVASCULAR  - AIN/PIN/Radial/Median/Ulnar Nerves assessed in isolation without deficit  - SILT throughout  - Radial & Ulnar arteries palpated 2+  - Capillary Refill <3s      Laboratory:  Recent Results (from the past 72 hour(s))   CBC auto differential    Collection Time: 05/14/18  4:43 PM   Result Value Ref Range    WBC 7.51 3.90 - 12.70 K/uL    RBC 4.41 (L) 4.60 - 6.20 M/uL    Hemoglobin 9.0 (L) 14.0 - 18.0 g/dL    Hematocrit 31.7 (L) 40.0 - 54.0 %    MCV 72 (L) 82 - 98 fL    MCH 20.4 (L) 27.0 - 31.0 pg    MCHC 28.4 (L) 32.0 - 36.0 g/dL    RDW 17.1 (H) 11.5 - 14.5 %    Platelets 223 150 - 350 K/uL    MPV SEE COMMENT 9.2 - 12.9 fL    Immature Granulocytes 0.4 0.0 - 0.5 %    Gran # (ANC) 6.7 1.8 - 7.7 K/uL    Immature Grans (Abs) 0.03 0.00 - 0.04 K/uL    Lymph # 0.6 (L) 1.0 - 4.8 K/uL    Mono # 0.2 (L) 0.3 - 1.0 K/uL    Eos # 0.0 0.0 - 0.5 K/uL    Baso # 0.00 0.00 - 0.20 K/uL     nRBC 0 0 /100 WBC    Gran% 88.8 (H) 38.0 - 73.0 %    Lymph% 8.0 (L) 18.0 - 48.0 %    Mono% 2.8 (L) 4.0 - 15.0 %    Eosinophil% 0.0 0.0 - 8.0 %    Basophil% 0.0 0.0 - 1.9 %    Platelet Estimate Appears normal     Aniso Slight     Poik Slight     Poly Occasional     Hypo Moderate     Ovalocytes Occasional     Garcia Cells Occasional     Schistocytes Present     Fragmented Cells Occasional     Differential Method Automated    Comprehensive metabolic panel    Collection Time: 05/14/18  4:43 PM   Result Value Ref Range    Sodium 143 136 - 145 mmol/L    Potassium 3.7 3.5 - 5.1 mmol/L    Chloride 112 (H) 95 - 110 mmol/L    CO2 21 (L) 23 - 29 mmol/L    Glucose 118 (H) 70 - 110 mg/dL    BUN, Bld 21 (H) 6 - 20 mg/dL    Creatinine 1.3 0.5 - 1.4 mg/dL    Calcium 8.1 (L) 8.7 - 10.5 mg/dL    Total Protein 6.2 6.0 - 8.4 g/dL    Albumin 3.1 (L) 3.5 - 5.2 g/dL    Total Bilirubin 0.2 0.1 - 1.0 mg/dL    Alkaline Phosphatase 124 55 - 135 U/L    AST 10 10 - 40 U/L    ALT 9 (L) 10 - 44 U/L    Anion Gap 10 8 - 16 mmol/L    eGFR if African American >60.0 >60 mL/min/1.73 m^2    eGFR if non African American >60.0 >60 mL/min/1.73 m^2   Lactic acid, plasma    Collection Time: 05/14/18  4:43 PM   Result Value Ref Range    Lactate (Lactic Acid) 2.4 (H) 0.5 - 2.2 mmol/L   Sedimentation rate, manual    Collection Time: 05/14/18  4:43 PM   Result Value Ref Range    Sed Rate 20 (H) 0 - 10 mm/Hr   C-reactive protein    Collection Time: 05/14/18  4:43 PM   Result Value Ref Range    CRP 2.3 0.0 - 8.2 mg/L   Uric acid    Collection Time: 05/14/18  4:43 PM   Result Value Ref Range    Uric Acid 6.5 3.4 - 7.0 mg/dL   Lactic acid, plasma    Collection Time: 05/14/18  6:23 PM   Result Value Ref Range    Lactate (Lactic Acid) 1.9 0.5 - 2.2 mmol/L       Diagnostic Results:  X-Ray: Reviewed    ASSESSMENT/PLAN:     A/P: Stu Mitchell is a 43 y.o. 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       Topher Toth M.D. PGY1  Orthopedic Surgery

## 2018-05-19 ENCOUNTER — HOSPITAL ENCOUNTER (EMERGENCY)
Facility: HOSPITAL | Age: 43
Discharge: HOME | End: 2018-05-19
Attending: FAMILY MEDICINE
Payer: MEDICAID

## 2018-05-19 VITALS
OXYGEN SATURATION: 100 % | DIASTOLIC BLOOD PRESSURE: 96 MMHG | HEART RATE: 80 BPM | WEIGHT: 180 LBS | TEMPERATURE: 98 F | BODY MASS INDEX: 25.77 KG/M2 | HEIGHT: 70 IN | RESPIRATION RATE: 16 BRPM | SYSTOLIC BLOOD PRESSURE: 193 MMHG

## 2018-05-19 DIAGNOSIS — L03.011 CELLULITIS OF FINGER OF RIGHT HAND: ICD-10-CM

## 2018-05-19 DIAGNOSIS — M10.9 GOUTY ARTHRITIS: Primary | ICD-10-CM

## 2018-05-19 LAB
ALBUMIN SERPL BCP-MCNC: 3.3 G/DL
ALP SERPL-CCNC: 123 U/L
ALT SERPL W/O P-5'-P-CCNC: 8 U/L
ANION GAP SERPL CALC-SCNC: 10 MMOL/L
AST SERPL-CCNC: 10 U/L
BACTERIA BLD CULT: NORMAL
BASOPHILS # BLD AUTO: 0.04 K/UL
BASOPHILS NFR BLD: 0.6 %
BILIRUB SERPL-MCNC: 0.2 MG/DL
BUN SERPL-MCNC: 15 MG/DL
CALCIUM SERPL-MCNC: 8.6 MG/DL
CHLORIDE SERPL-SCNC: 107 MMOL/L
CO2 SERPL-SCNC: 25 MMOL/L
CREAT SERPL-MCNC: 1.4 MG/DL
CRP SERPL-MCNC: 3.9 MG/L
DIFFERENTIAL METHOD: ABNORMAL
EOSINOPHIL # BLD AUTO: 0.2 K/UL
EOSINOPHIL NFR BLD: 2.3 %
ERYTHROCYTE [DISTWIDTH] IN BLOOD BY AUTOMATED COUNT: 16.9 %
ERYTHROCYTE [SEDIMENTATION RATE] IN BLOOD BY WESTERGREN METHOD: 19 MM/HR
EST. GFR  (AFRICAN AMERICAN): >60 ML/MIN/1.73 M^2
EST. GFR  (NON AFRICAN AMERICAN): >60 ML/MIN/1.73 M^2
GLUCOSE SERPL-MCNC: 94 MG/DL
HCT VFR BLD AUTO: 29.3 %
HGB BLD-MCNC: 8.3 G/DL
IMM GRANULOCYTES # BLD AUTO: 0.03 K/UL
IMM GRANULOCYTES NFR BLD AUTO: 0.5 %
LACTATE SERPL-SCNC: 1.1 MMOL/L
LYMPHOCYTES # BLD AUTO: 2 K/UL
LYMPHOCYTES NFR BLD: 29.7 %
MCH RBC QN AUTO: 20.5 PG
MCHC RBC AUTO-ENTMCNC: 28.3 G/DL
MCV RBC AUTO: 72 FL
MONOCYTES # BLD AUTO: 0.6 K/UL
MONOCYTES NFR BLD: 9.1 %
NEUTROPHILS # BLD AUTO: 3.8 K/UL
NEUTROPHILS NFR BLD: 57.8 %
NRBC BLD-RTO: 0 /100 WBC
PLATELET # BLD AUTO: 272 K/UL
PMV BLD AUTO: 11.6 FL
POTASSIUM SERPL-SCNC: 3.6 MMOL/L
PROT SERPL-MCNC: 6.3 G/DL
RBC # BLD AUTO: 4.05 M/UL
SODIUM SERPL-SCNC: 142 MMOL/L
WBC # BLD AUTO: 6.59 K/UL

## 2018-05-19 PROCEDURE — 87040 BLOOD CULTURE FOR BACTERIA: CPT | Mod: 59

## 2018-05-19 PROCEDURE — 83605 ASSAY OF LACTIC ACID: CPT

## 2018-05-19 PROCEDURE — 25000003 PHARM REV CODE 250: Performed by: PHYSICIAN ASSISTANT

## 2018-05-19 PROCEDURE — 80053 COMPREHEN METABOLIC PANEL: CPT

## 2018-05-19 PROCEDURE — 99284 EMERGENCY DEPT VISIT MOD MDM: CPT | Mod: 25

## 2018-05-19 PROCEDURE — 86140 C-REACTIVE PROTEIN: CPT

## 2018-05-19 PROCEDURE — S0077 INJECTION, CLINDAMYCIN PHOSP: HCPCS | Performed by: PHYSICIAN ASSISTANT

## 2018-05-19 PROCEDURE — 85025 COMPLETE CBC W/AUTO DIFF WBC: CPT

## 2018-05-19 PROCEDURE — 85651 RBC SED RATE NONAUTOMATED: CPT

## 2018-05-19 PROCEDURE — 96365 THER/PROPH/DIAG IV INF INIT: CPT

## 2018-05-19 RX ORDER — MORPHINE SULFATE 15 MG/1
15 TABLET ORAL
Status: COMPLETED | OUTPATIENT
Start: 2018-05-19 | End: 2018-05-19

## 2018-05-19 RX ORDER — CLINDAMYCIN HYDROCHLORIDE 150 MG/1
300 CAPSULE ORAL 4 TIMES DAILY
Qty: 56 CAPSULE | Refills: 0 | Status: SHIPPED | OUTPATIENT
Start: 2018-05-19 | End: 2018-05-26

## 2018-05-19 RX ORDER — CLINDAMYCIN PHOSPHATE 900 MG/50ML
900 INJECTION, SOLUTION INTRAVENOUS
Status: COMPLETED | OUTPATIENT
Start: 2018-05-19 | End: 2018-05-19

## 2018-05-19 RX ADMIN — CLINDAMYCIN PHOSPHATE 900 MG: 18 INJECTION, SOLUTION INTRAVENOUS at 08:05

## 2018-05-19 RX ADMIN — MORPHINE SULFATE 15 MG: 15 TABLET ORAL at 08:05

## 2018-05-19 NOTE — ED NOTES
"Middle finger L hand.  +swelling, redness and draining.  Pt eating chips with that hand.  Reports this has been present off and on for four weeks.  States he had some blood work and was called in for "infection"  Denies fevers  Reports h/o osteo to that hand  "

## 2018-05-19 NOTE — ED TRIAGE NOTES
Presents to ER with L hand middle finger pain. +redness +swelling +drainage.  Patient's name and date of birth checked and is correct.  LOC: The patient is awake, alert and aware of environment with an appropriate affect, the patient is oriented x 3 and speaking appropriately.  APPEARANCE: Patient resting comfortably and in no acute distress, patient is clean and well groomed, patient's clothing is properly fastened.  CARDIOVASCULAR:  Heart rate regular and even with no peripheral edema noted.  SKIN: The skin is warm and dry, patient has normal skin turgor and moist mucus membranes.  MUSKULOSKELETAL: Patient moving all extremities well, no obvious swelling or deformities noted.  RESPIRATORY: Airway is open and patent, respirations are spontaneous, patient has a normal effort and rate.

## 2018-05-19 NOTE — ED PROVIDER NOTES
Encounter Date: 5/19/2018       History     Chief Complaint   Patient presents with    Hand Pain     L hand middle finger pain. +redness +swelling +drainage      42 yo M with hx of gout, osteomyelitis, cellulitis, HTN returns to the ED with hand pain. He reports pain and swelling to the distal 3rd digit of the left hand for several days. He reports clear and cloudy drainage from the area. I personally called Mr. Mitchell yesterday after I received positive blood culture results obtained from his previous ED visit 5 days ago. He was treated for gout with steroids. He reports no significant improvement and notes persistent chills. Denies fever, weakness or other acute complaints.           Review of patient's allergies indicates:   Allergen Reactions    Nsaids (non-steroidal anti-inflammatory drug)      Hx of GI bleed     Past Medical History:   Diagnosis Date    Arthritis 10/11/2013    Blood transfusion     Cellulitis     GERD (gastroesophageal reflux disease)     GI bleed     Gout attack     Hypertension     Neuropathy     Osteomyelitis      Past Surgical History:   Procedure Laterality Date    CYST REMOVAL      INCISION AND DRAINAGE OF WOUND      left hand third finger    SURERY ON LEFT MIDDLE FINGER      OSTEOMYLITIS     Family History   Problem Relation Age of Onset    Heart disease Mother     Diabetes Mother     Kidney disease Mother     Hypertension Mother     Stroke Mother     Cancer Mother      Social History   Substance Use Topics    Smoking status: Never Smoker    Smokeless tobacco: Never Used    Alcohol use No     Review of Systems   Constitutional: Positive for chills. Negative for fever.   HENT: Negative for sore throat.    Respiratory: Negative for shortness of breath.    Cardiovascular: Negative for chest pain.   Gastrointestinal: Negative for nausea.   Genitourinary: Negative for dysuria.   Musculoskeletal: Positive for arthralgias (finger pain). Negative for back pain.   Skin:  Negative for rash.   Neurological: Negative for weakness.   Hematological: Does not bruise/bleed easily.       Physical Exam     Initial Vitals [05/19/18 0758]   BP Pulse Resp Temp SpO2   (!) 175/81 88 16 97.7 °F (36.5 °C) 100 %      MAP       112.33         Physical Exam    Nursing note and vitals reviewed.  Constitutional: He appears well-developed and well-nourished.  Non-toxic appearance. He does not appear ill. No distress.   HENT:   Head: Normocephalic and atraumatic.   Neck: Normal range of motion. Neck supple.   Cardiovascular: Normal rate and regular rhythm. Exam reveals no distant heart sounds.    Pulmonary/Chest: Effort normal. No accessory muscle usage. No tachypnea. No respiratory distress.   Abdominal: He exhibits no distension.   Musculoskeletal: Normal range of motion.   Swelling, tenderness, mild warmth, erythema, dried drainage to the DIP of the L 3rd digit. Tophi noted. Finger pad spared.    Neurological: He is alert.   Skin: Skin is warm and dry. No rash noted. No pallor.   Psychiatric: He has a normal mood and affect. His behavior is normal.         ED Course   Procedures  Labs Reviewed   CBC W/ AUTO DIFFERENTIAL - Abnormal; Notable for the following:        Result Value    RBC 4.05 (*)     Hemoglobin 8.3 (*)     Hematocrit 29.3 (*)     MCV 72 (*)     MCH 20.5 (*)     MCHC 28.3 (*)     RDW 16.9 (*)     All other components within normal limits   COMPREHENSIVE METABOLIC PANEL - Abnormal; Notable for the following:     Calcium 8.6 (*)     Albumin 3.3 (*)     ALT 8 (*)     All other components within normal limits   SEDIMENTATION RATE, MANUAL - Abnormal; Notable for the following:     Sed Rate 19 (*)     All other components within normal limits   CULTURE, BLOOD   CULTURE, BLOOD   LACTIC ACID, PLASMA   C-REACTIVE PROTEIN             Medical Decision Making:   History:   Old Medical Records: I decided to obtain old medical records.  Differential Diagnosis:   My differential diagnosis includes but is  not limited to:  Gout, cellulitis, osteomyelitis, bacteremia, septic joint  Clinical Tests:   Lab Tests: Ordered and Reviewed  Radiological Study: Ordered and Reviewed       APC / Resident Notes:   43-year-old male with chronic gout returns to the ED with positive blood culture results.  Patient reports persistent chills, however has frequent chills due to chronic anemia.  He is afebrile.  Hypertensive at 175/81.  Patient is nontoxic appearing.  Patient has tophitic gout to the left 3rd DIP.   No leukocytosis and blood work.  Normal lactate.  Blood cultures were repeated.  Mild elevation in sed rate.  CRP is normal. X-ray suggests gout.  Based on patient's clinical appearance and unremarkable workup, I do not feel that he needs to be admitted to the hospital.  He received 1 dose of IV clindamycin in the ED.  He will be discharged in stable condition. I will cover with antibiotics given the evidence of drainage from the finger.  Patient again referred to rheumatology.  Return precautions given.  I have reviewed the patient's records and discussed this case with my supervising physician.                   Clinical Impression:   The primary encounter diagnosis was Gouty arthritis. A diagnosis of Cellulitis of finger of right hand was also pertinent to this visit.    Disposition:   Disposition: Discharged  Condition: Stable                        Susan Vazquez PA-C  05/19/18 [id number]

## 2018-05-22 LAB
BACTERIA BLD CULT: NORMAL

## 2018-05-24 LAB
BACTERIA BLD CULT: NORMAL
BACTERIA BLD CULT: NORMAL

## 2018-07-02 ENCOUNTER — HOSPITAL ENCOUNTER (EMERGENCY)
Facility: HOSPITAL | Age: 43
Discharge: HOME | End: 2018-07-02
Attending: EMERGENCY MEDICINE
Payer: MEDICAID

## 2018-07-02 VITALS
BODY MASS INDEX: 26.2 KG/M2 | WEIGHT: 183 LBS | RESPIRATION RATE: 18 BRPM | OXYGEN SATURATION: 100 % | DIASTOLIC BLOOD PRESSURE: 101 MMHG | HEART RATE: 88 BPM | SYSTOLIC BLOOD PRESSURE: 148 MMHG | HEIGHT: 70 IN | TEMPERATURE: 99 F

## 2018-07-02 DIAGNOSIS — L30.9 DERMATITIS: Primary | ICD-10-CM

## 2018-07-02 PROCEDURE — 99283 EMERGENCY DEPT VISIT LOW MDM: CPT | Mod: ,,, | Performed by: PHYSICIAN ASSISTANT

## 2018-07-02 PROCEDURE — 99283 EMERGENCY DEPT VISIT LOW MDM: CPT

## 2018-07-02 RX ORDER — CLINDAMYCIN HYDROCHLORIDE 150 MG/1
300 CAPSULE ORAL 4 TIMES DAILY
Qty: 56 CAPSULE | Refills: 0 | Status: SHIPPED | OUTPATIENT
Start: 2018-07-02 | End: 2018-07-09

## 2018-07-02 NOTE — ED NOTES
LOC: The patient is awake and alert; oriented x 3 and speaking appropriately.  APPEARANCE: Patient resting comfortably, patient is clean and well groomed  SKIN: warm and dry, normal skin turgor & moist mucus membranes, skin intact, no breakdown noted.Redness, swelling and pain in both lewer legs.  MUSCULOSKELETAL: Patient moving all extremities well, no obvious swelling or deformities noted  RESPIRATORY: Airway is open and patent,  respirations are spontaneous, normal effort and rate  CARDIAC: Patient has a normal rate, no peripheral edema noted, capillary refill < 3 seconds; No complaints of chest pain   ABDOMEN: Soft and denies abd pain

## 2018-07-02 NOTE — ED TRIAGE NOTES
Increased swelling and redness with a burning pain in both l;egs, painful to walk. Onset 3 days ago. Completed bactrim several wks ago for  A toe infection. Deneis fever

## 2018-07-02 NOTE — ED PROVIDER NOTES
"The Encounter Date: 7/2/2018       History     Chief Complaint   Patient presents with    Cellulitis     Pt presents to ED stating "I think I have cellulitis in my legs". Sage LEs are red and swollen. + pain     This is a 43 year old male with a PMH of HTN, gout, GI bleed, recurrent cellulitis presents to the ED with a chief complaint of leg pain and rash. Patient has a hx of chronic dependent edema to bilateral lower extremities. He works in a bakery standing long hours every day. He reports increased redness to the anterior lower legs 3 days ago. Yesterday an area of superficial excoriation started draining on the right leg. This area is much more painful that the surrounding areas. He describes the pain as a burning pain "down to the bone." He has applied topical bactroban without significant improvement. He denies fever, chills, vomiting, myalgias, calf pain or additional complaints. He had an admission in March 2018 with similar complaints - failed outpatient treatment on Keflex and was transitioned to Clindamycin.           Review of patient's allergies indicates:   Allergen Reactions    Nsaids (non-steroidal anti-inflammatory drug)      Hx of GI bleed     Past Medical History:   Diagnosis Date    Arthritis 10/11/2013    Blood transfusion     Cellulitis     GERD (gastroesophageal reflux disease)     GI bleed     Gout attack     Hypertension     Neuropathy     Osteomyelitis      Past Surgical History:   Procedure Laterality Date    CYST REMOVAL      INCISION AND DRAINAGE OF WOUND      left hand third finger    SURERY ON LEFT MIDDLE FINGER      OSTEOMYLITIS     Family History   Problem Relation Age of Onset    Heart disease Mother     Diabetes Mother     Kidney disease Mother     Hypertension Mother     Stroke Mother     Cancer Mother      Social History   Substance Use Topics    Smoking status: Never Smoker    Smokeless tobacco: Never Used    Alcohol use No     Review of Systems "   Constitutional: Negative for chills and fever.   HENT: Negative for sore throat.    Respiratory: Negative for shortness of breath.    Cardiovascular: Positive for leg swelling. Negative for chest pain.   Gastrointestinal: Negative for nausea and vomiting.   Genitourinary: Negative for dysuria.   Musculoskeletal: Negative for back pain.   Skin: Positive for rash.   Neurological: Negative for weakness.   Hematological: Does not bruise/bleed easily.       Physical Exam     Initial Vitals [07/02/18 0643]   BP Pulse Resp Temp SpO2   (!) 180/102 93 20 98.5 °F (36.9 °C) 99 %      MAP       --         Physical Exam    Constitutional: He appears well-developed and well-nourished. No distress.   HENT:   Head: Atraumatic.   Eyes: Conjunctivae and EOM are normal. Pupils are equal, round, and reactive to light.   Cardiovascular: Normal rate, regular rhythm and normal heart sounds.   Pulmonary/Chest: Breath sounds normal. No respiratory distress. He has no wheezes. He has no rhonchi. He has no rales.   Abdominal: Soft. Bowel sounds are normal. There is no tenderness. There is no rebound and no guarding.   Neurological: He is alert and oriented to person, place, and time.   Skin: Skin is warm and dry. Rash noted. Rash is maculopapular. Rash is not vesicular. There is erythema.   2+ pitting edema of bilateral lower extremities    Patchy erythematous lesions of bilateral lower extremities with superficial telangiectasias.     Excoriated very tender 3cm x 2cm area on the anterior right lower leg. There is induration without appreciable drainage. Lesions to this area are crusted over.              ED Course   Procedures  Labs Reviewed - No data to display       Imaging Results    None                APC / Resident Notes:   43 year old male presents with rash of the lower extremities.  On exam he is afebrile and nontoxic. There is edema of the lower extremities with an irregular patchy erythematous rash. An area of the anterior right  shin appears excoriated and in different stages of healing.    DDx includes but is not limited to vasculitis, folliculitis, zoster, cellulitis, dependent edema, PVD.    Given patient's extensive history of progressive infections, I will treat him with clindamycin.  Have recommended that he follow up with Dermatology for prompt outpatient evaluation.  He does have some mildly elevated blood pressure in the ED today, and but is asymptomatic of this.  I have recommended that he continue with his home meds and keep a log to follow up with his PCP for a recheck later this week.  He is stable for discharge. I discussed the care of this patient with my supervising MD.                    Clinical Impression:   The encounter diagnosis was Dermatitis.      Disposition:   Disposition: Discharged  Condition: Stable                        Cielo Flynn PA-C  07/02/18 0912

## 2018-08-07 ENCOUNTER — HOSPITAL ENCOUNTER (EMERGENCY)
Facility: HOSPITAL | Age: 43
Discharge: HOME | End: 2018-08-07
Attending: EMERGENCY MEDICINE
Payer: MEDICAID

## 2018-08-07 VITALS
TEMPERATURE: 99 F | DIASTOLIC BLOOD PRESSURE: 80 MMHG | BODY MASS INDEX: 25.62 KG/M2 | OXYGEN SATURATION: 100 % | HEART RATE: 91 BPM | SYSTOLIC BLOOD PRESSURE: 146 MMHG | RESPIRATION RATE: 16 BRPM | HEIGHT: 70 IN | WEIGHT: 179 LBS

## 2018-08-07 DIAGNOSIS — M79.89 RIGHT LEG SWELLING: Primary | ICD-10-CM

## 2018-08-07 DIAGNOSIS — L03.115 CELLULITIS OF RIGHT ANTERIOR LOWER LEG: ICD-10-CM

## 2018-08-07 LAB
ANION GAP SERPL CALC-SCNC: 11 MMOL/L
BASOPHILS # BLD AUTO: 0.06 K/UL
BASOPHILS NFR BLD: 0.9 %
BUN SERPL-MCNC: 20 MG/DL
CALCIUM SERPL-MCNC: 9.1 MG/DL
CHLORIDE SERPL-SCNC: 105 MMOL/L
CO2 SERPL-SCNC: 22 MMOL/L
CREAT SERPL-MCNC: 1.6 MG/DL
DIFFERENTIAL METHOD: ABNORMAL
EOSINOPHIL # BLD AUTO: 0.2 K/UL
EOSINOPHIL NFR BLD: 3.7 %
ERYTHROCYTE [DISTWIDTH] IN BLOOD BY AUTOMATED COUNT: 17.1 %
EST. GFR  (AFRICAN AMERICAN): 59.7 ML/MIN/1.73 M^2
EST. GFR  (NON AFRICAN AMERICAN): 51.6 ML/MIN/1.73 M^2
GLUCOSE SERPL-MCNC: 88 MG/DL
HCT VFR BLD AUTO: 27.6 %
HGB BLD-MCNC: 8 G/DL
IMM GRANULOCYTES # BLD AUTO: 0.02 K/UL
IMM GRANULOCYTES NFR BLD AUTO: 0.3 %
LYMPHOCYTES # BLD AUTO: 1.2 K/UL
LYMPHOCYTES NFR BLD: 18.6 %
MCH RBC QN AUTO: 20.3 PG
MCHC RBC AUTO-ENTMCNC: 29 G/DL
MCV RBC AUTO: 70 FL
MONOCYTES # BLD AUTO: 0.5 K/UL
MONOCYTES NFR BLD: 8.3 %
NEUTROPHILS # BLD AUTO: 4.4 K/UL
NEUTROPHILS NFR BLD: 68.2 %
NRBC BLD-RTO: 0 /100 WBC
PLATELET # BLD AUTO: 288 K/UL
PMV BLD AUTO: 10.5 FL
POTASSIUM SERPL-SCNC: 3.9 MMOL/L
RBC # BLD AUTO: 3.94 M/UL
SODIUM SERPL-SCNC: 138 MMOL/L
WBC # BLD AUTO: 6.49 K/UL

## 2018-08-07 PROCEDURE — 85025 COMPLETE CBC W/AUTO DIFF WBC: CPT

## 2018-08-07 PROCEDURE — 99284 EMERGENCY DEPT VISIT MOD MDM: CPT | Mod: ,,, | Performed by: EMERGENCY MEDICINE

## 2018-08-07 PROCEDURE — 25000003 PHARM REV CODE 250: Performed by: EMERGENCY MEDICINE

## 2018-08-07 PROCEDURE — 99283 EMERGENCY DEPT VISIT LOW MDM: CPT

## 2018-08-07 PROCEDURE — 80048 BASIC METABOLIC PNL TOTAL CA: CPT

## 2018-08-07 RX ORDER — CEPHALEXIN 500 MG/1
500 CAPSULE ORAL 4 TIMES DAILY
Qty: 20 CAPSULE | Refills: 0 | Status: SHIPPED | OUTPATIENT
Start: 2018-08-07 | End: 2018-08-12

## 2018-08-07 RX ORDER — ACETAMINOPHEN 325 MG/1
650 TABLET ORAL
Status: COMPLETED | OUTPATIENT
Start: 2018-08-07 | End: 2018-08-07

## 2018-08-07 RX ADMIN — ACETAMINOPHEN 650 MG: 325 TABLET, FILM COATED ORAL at 06:08

## 2018-08-07 NOTE — ED TRIAGE NOTES
Pt. Presents to ED today with c/o right lower extremity pain and swelling x2 days. Pt. States itching to rle also, small area noted to anterior and medial calf with minimal drainage noted per pt. +ambulatory with steady gait.

## 2018-08-07 NOTE — ED PROVIDER NOTES
Encounter Date: 8/7/2018    SCRIBE #1 NOTE: I, Deisy Castellanos, am scribing for, and in the presence of,  Dr. Alfaro . I have scribed the entire note.       History     Chief Complaint   Patient presents with    Leg Swelling     right leg swelling and right foot swelling. hx of gout      Time patient was seen by the provider: 6:28 PM      This is a 43 y.o. male with co-morbidities including gout attack, HTN, cellulitis, neuropathy, arthritis and osteomyelitis who presents to the ED with a chief complaint of lower extremity swelling x 3 days. Patient endorses chronic bilateral lower leg and ankle swelling, worsened on right, and itching to his feet bilaterally. Additionally endorses constant, sharp pain to right ankle, worsened since last night, with limited range of motion secondary to pain, and chills and nausea since this morning. Denies pain to left leg or ankle. Patient also reports an area on his right shin that began oozing yesterday. Denies fever, chest pain, urinary symptoms.       The history is provided by the patient and medical records.     Review of patient's allergies indicates:   Allergen Reactions    Nsaids (non-steroidal anti-inflammatory drug)      Hx of GI bleed     Past Medical History:   Diagnosis Date    Arthritis 10/11/2013    Blood transfusion     Cellulitis     GERD (gastroesophageal reflux disease)     GI bleed     Gout attack     Hypertension     Neuropathy     Osteomyelitis      Past Surgical History:   Procedure Laterality Date    CYST REMOVAL      INCISION AND DRAINAGE OF WOUND      left hand third finger    SURERY ON LEFT MIDDLE FINGER      OSTEOMYLITIS     Family History   Problem Relation Age of Onset    Heart disease Mother     Diabetes Mother     Kidney disease Mother     Hypertension Mother     Stroke Mother     Cancer Mother      Social History   Substance Use Topics    Smoking status: Never Smoker    Smokeless tobacco: Never Used    Alcohol use No      Review of Systems   Constitutional: Positive for chills.   HENT: Negative for congestion.    Eyes: Negative for photophobia.   Respiratory: Negative for cough and shortness of breath.    Cardiovascular: Positive for leg swelling. Negative for chest pain.   Gastrointestinal: Positive for nausea. Negative for diarrhea and vomiting.   Genitourinary: Negative for dysuria, frequency and hematuria.   Musculoskeletal: Positive for arthralgias and joint swelling.   Skin:        Positive for area of oozing on right lower extremity.    Neurological: Negative for dizziness, light-headedness and headaches.       Physical Exam     Initial Vitals [08/07/18 1814]   BP Pulse Resp Temp SpO2   (!) 157/71 104 18 99.8 °F (37.7 °C) 99 %      MAP       --         Physical Exam    Nursing note and vitals reviewed.  Constitutional: He appears well-developed and well-nourished. He is not diaphoretic. No distress.   HENT:   Head: Normocephalic and atraumatic.   Mouth/Throat: Oropharynx is clear and moist.   Neck: Normal range of motion. Neck supple. No JVD present.   Cardiovascular: Normal rate, regular rhythm, normal heart sounds and intact distal pulses.   Pulmonary/Chest: Breath sounds normal. No respiratory distress. He has no wheezes. He has no rhonchi. He has no rales.   Abdominal: Soft. He exhibits no distension. There is no tenderness.   Musculoskeletal: Normal range of motion. He exhibits edema.   2+ edema to the mid-shin bilaterally. Limited range of motion of the right ankle secondary to pain.    Lymphadenopathy:     He has no cervical adenopathy.   Neurological: He is alert and oriented to person, place, and time. He has normal strength. No cranial nerve deficit or sensory deficit.   Skin: Skin is warm and dry. There is erythema.   Warmth and erythema on the anterior aspect of the right shin. Feet warm and well-perfused with some edema. Small excoriation on the right medial aspect of the shin with some clear drainage.          ED Course   Procedures  Labs Reviewed   CBC W/ AUTO DIFFERENTIAL - Abnormal; Notable for the following:        Result Value    RBC 3.94 (*)     Hemoglobin 8.0 (*)     Hematocrit 27.6 (*)     MCV 70 (*)     MCH 20.3 (*)     MCHC 29.0 (*)     RDW 17.1 (*)     All other components within normal limits   BASIC METABOLIC PANEL - Abnormal; Notable for the following:     CO2 22 (*)     Creatinine 1.6 (*)     eGFR if  59.7 (*)     eGFR if non  51.6 (*)     All other components within normal limits          Imaging Results    None          Medical Decision Making:   History:   Old Medical Records: I decided to obtain old medical records.  Initial Assessment:   Emergent evaluation of 43 y.o. male presenting to swelling to the right lower extremity and erythema. Patient's had multiple visits in the past for leg edema, ultrasounds that have been negative for DVT, and frequent visits for cellulitis.  Differential Diagnosis:   Cellulitis, acute gouty arthritis, chronic stasis disease, dependent leg edema. I considered, but doubt, septic joint.   Clinical Tests:   Lab Tests: Ordered and Reviewed  ED Management:  Basic labs ordered. I suspect this is secondary to venous stasis and dependent edema, but will discharge with antibiotics for right anterior shin erythema.     7:45 PM Labs reviewed with no significant abnormality. Patient's vital signs are stable. I doubt severe cellulitis or sepsis that would require inpatient treatment. Will discharge with oral antibiotics            Scribe Attestation:   Scribe #1: I performed the above scribed service and the documentation accurately describes the services I performed. I attest to the accuracy of the note.    Attending Attestation:           Physician Attestation for Scribe:      Comments: I, Dr. Viviana Alfaro, personally performed the services described in this documentation. All medical record entries made by the scribe were at my direction and  in my presence.  I have reviewed the chart and agree that the record reflects my personal performance and is accurate and complete. Viviana Alfaro MD.                 Clinical Impression:   The primary encounter diagnosis was Right leg swelling. A diagnosis of Cellulitis of right anterior lower leg was also pertinent to this visit.      Disposition:   Disposition: Discharged  Condition: Stable                        Viviana Alfaro MD  08/08/18 0054

## 2018-09-11 ENCOUNTER — HOSPITAL ENCOUNTER (EMERGENCY)
Facility: OTHER | Age: 43
Discharge: HOME | End: 2018-09-11
Attending: EMERGENCY MEDICINE
Payer: MEDICAID

## 2018-09-11 VITALS
HEART RATE: 84 BPM | DIASTOLIC BLOOD PRESSURE: 79 MMHG | RESPIRATION RATE: 14 BRPM | BODY MASS INDEX: 25.05 KG/M2 | WEIGHT: 175 LBS | HEIGHT: 70 IN | SYSTOLIC BLOOD PRESSURE: 160 MMHG | OXYGEN SATURATION: 100 % | TEMPERATURE: 99 F

## 2018-09-11 DIAGNOSIS — S62.357A CLOSED NONDISPLACED FRACTURE OF SHAFT OF FIFTH METACARPAL BONE OF LEFT HAND, INITIAL ENCOUNTER: Primary | ICD-10-CM

## 2018-09-11 PROCEDURE — 99283 EMERGENCY DEPT VISIT LOW MDM: CPT | Mod: 25

## 2018-09-11 PROCEDURE — 25000003 PHARM REV CODE 250: Performed by: EMERGENCY MEDICINE

## 2018-09-11 PROCEDURE — 29125 APPL SHORT ARM SPLINT STATIC: CPT

## 2018-09-11 RX ORDER — ACETAMINOPHEN 500 MG
1000 TABLET ORAL
Status: COMPLETED | OUTPATIENT
Start: 2018-09-11 | End: 2018-09-11

## 2018-09-11 RX ORDER — HYDROCODONE BITARTRATE AND ACETAMINOPHEN 5; 325 MG/1; MG/1
1 TABLET ORAL EVERY 4 HOURS PRN
Qty: 15 TABLET | Refills: 0 | Status: SHIPPED | OUTPATIENT
Start: 2018-09-11 | End: 2019-01-18

## 2018-09-11 RX ORDER — HYDROCODONE BITARTRATE AND ACETAMINOPHEN 5; 325 MG/1; MG/1
1 TABLET ORAL
Status: COMPLETED | OUTPATIENT
Start: 2018-09-11 | End: 2018-09-11

## 2018-09-11 RX ADMIN — ACETAMINOPHEN 1000 MG: 500 TABLET, FILM COATED ORAL at 03:09

## 2018-09-11 RX ADMIN — HYDROCODONE BITARTRATE AND ACETAMINOPHEN 1 TABLET: 5; 325 TABLET ORAL at 03:09

## 2018-09-11 NOTE — ED PROVIDER NOTES
Encounter Date: 9/11/2018    SCRIBE #1 NOTE: I, Betzy Cristina, am scribing for, and in the presence of, Dr. Lau.       History     Chief Complaint   Patient presents with    Hand Pain     Pt CO pain and swelling to left hand after a frying pan fell on it 3 days ago.      Time seen by provider: 3:05 AM    This is a 43 y.o. male who presents with complaint of frying pan falling on his left hand three days ago. He reports that his hand began swelling and any hand movement is painful.       The history is provided by the patient.     Review of patient's allergies indicates:   Allergen Reactions    Nsaids (non-steroidal anti-inflammatory drug)      Hx of GI bleed     Past Medical History:   Diagnosis Date    Arthritis 10/11/2013    Blood transfusion     Cellulitis     GERD (gastroesophageal reflux disease)     GI bleed     Gout attack     Hypertension     Neuropathy     Osteomyelitis      Past Surgical History:   Procedure Laterality Date    CYST REMOVAL      INCISION AND DRAINAGE OF WOUND      left hand third finger    IRRIGATION AND DEBRIDEMENT RIGHT FINGER  Right 3/7/2016    [facility]formed [facility]LR    IRRIGATION AND DEBRIDEMENT, UPPER EXTREMITY Left 10/24/2013    [facility]an [facility] at [facility]LR    SURERY ON LEFT MIDDLE FINGER      OSTEOMYLITIS     Family History   Problem Relation Age of Onset    Heart disease Mother     Diabetes Mother     Kidney disease Mother     Hypertension Mother     Stroke Mother     Cancer Mother      Social History     Tobacco Use    Smoking status: Never Smoker    Smokeless tobacco: Never Used   Substance Use Topics    Alcohol use: No    Drug use: No     Review of Systems   Constitutional: Negative for chills, diaphoresis and fever.   HENT: Negative for sore throat.    Respiratory: Negative for shortness of breath.    Cardiovascular: Negative for chest pain.   Gastrointestinal: Negative for diarrhea, nausea and  vomiting.   Genitourinary: Negative for dysuria.   Musculoskeletal: Negative for back pain.        Positive for hand pain. Positive for hand swelling.   Skin: Negative for rash.   Neurological: Negative for weakness.   Hematological: Does not bruise/bleed easily.       Physical Exam     Initial Vitals [09/11/18 0256]   BP Pulse Resp Temp SpO2   (!) 146/76 94 14 98.7 °F (37.1 °C) 100 %      MAP       --         Physical Exam    Nursing note and vitals reviewed.  Constitutional: He is not diaphoretic. No distress.   Musculoskeletal:   Limited exam. Less than two second capillary refill in all five digits. Chronic swelling and tenderness to DIP of third digit. Hand dorsum has diffuse swelling and tenderness with ecchymosis over ulnar aspect. No tenderness or swelling of wrist. Normal range of motion in wrist. No injuries proximal to wrist.         ED Course   Procedures  Labs Reviewed - No data to display       Imaging Results    None       X-Rays:   Independently Interpreted Readings:   Other Readings:  Fracture of the fifth metacarpal.    Medical Decision Making:   Clinical Tests:   Radiological Study: Ordered and Reviewed  ED Management:  Patient presents complaining of left hand pain and swelling after a traumatic event.  Well known to this emergency department, multiple previous evaluations for musculoskeletal pains.  Does have new swelling and pain tonight.  His usual complaint is left 3rd digit.  This demonstrates chronic swelling, but he reports it is not causing any trouble tonight.  X-rays obtained to do demonstrate nondisplaced fracture of the shaft of the 5th metacarpal.  Placed an ulnar gutter splint.  Prescribed with short prescription for Norco.  Provided with Hand surgery follow-up.  Discussed rice therapy.  Return here with any worsening symptoms.  Neurovascularly intact at time of discharge. Denies any other injuries.    I did have an extensive talk regarding signs to return for and need for follow  up. Patient expressed understanding and will monitor symptoms closely and follow-up as needed.    DANIEL Lau M.D.  09/11/2018  4:08 AM              Scribe Attestation:   Scribe #1: I performed the above scribed service and the documentation accurately describes the services I performed. I attest to the accuracy of the note.    Attending Attestation:           Physician Attestation for Scribe:  Physician Attestation Statement for Scribe #1: I, Dr. Lau, reviewed documentation, as scribed by Betzy Evans in my presence, and it is both accurate and complete.                    Clinical Impression:     1. Closed nondisplaced fracture of shaft of fifth metacarpal bone of left hand, initial encounter                                   Carlos Lau MD  09/11/18 0403

## 2018-10-08 ENCOUNTER — HOSPITAL ENCOUNTER (EMERGENCY)
Facility: HOSPITAL | Age: 43
Discharge: HOME | End: 2018-10-08
Attending: EMERGENCY MEDICINE
Payer: MEDICAID

## 2018-10-08 VITALS
RESPIRATION RATE: 16 BRPM | TEMPERATURE: 98 F | WEIGHT: 175 LBS | BODY MASS INDEX: 25.92 KG/M2 | SYSTOLIC BLOOD PRESSURE: 135 MMHG | OXYGEN SATURATION: 99 % | DIASTOLIC BLOOD PRESSURE: 83 MMHG | HEIGHT: 69 IN | HEART RATE: 104 BPM

## 2018-10-08 DIAGNOSIS — L03.119 CELLULITIS OF LOWER EXTREMITY, UNSPECIFIED LATERALITY: Primary | ICD-10-CM

## 2018-10-08 LAB
ALBUMIN SERPL BCP-MCNC: 4 G/DL
ALP SERPL-CCNC: 134 U/L
ALT SERPL W/O P-5'-P-CCNC: 8 U/L
ANION GAP SERPL CALC-SCNC: 12 MMOL/L
AST SERPL-CCNC: 16 U/L
BASOPHILS # BLD AUTO: 0.06 K/UL
BASOPHILS NFR BLD: 1.1 %
BILIRUB SERPL-MCNC: 0.4 MG/DL
BUN SERPL-MCNC: 15 MG/DL
CALCIUM SERPL-MCNC: 9.2 MG/DL
CHLORIDE SERPL-SCNC: 103 MMOL/L
CO2 SERPL-SCNC: 24 MMOL/L
CREAT SERPL-MCNC: 1.5 MG/DL
DIFFERENTIAL METHOD: ABNORMAL
EOSINOPHIL # BLD AUTO: 0.1 K/UL
EOSINOPHIL NFR BLD: 2.5 %
ERYTHROCYTE [DISTWIDTH] IN BLOOD BY AUTOMATED COUNT: 18.6 %
EST. GFR  (AFRICAN AMERICAN): >60 ML/MIN/1.73 M^2
EST. GFR  (NON AFRICAN AMERICAN): 55.8 ML/MIN/1.73 M^2
GLUCOSE SERPL-MCNC: 113 MG/DL
HCT VFR BLD AUTO: 30.5 %
HGB BLD-MCNC: 8.4 G/DL
IMM GRANULOCYTES # BLD AUTO: 0.02 K/UL
IMM GRANULOCYTES NFR BLD AUTO: 0.4 %
LYMPHOCYTES # BLD AUTO: 1.1 K/UL
LYMPHOCYTES NFR BLD: 19.2 %
MCH RBC QN AUTO: 19 PG
MCHC RBC AUTO-ENTMCNC: 27.5 G/DL
MCV RBC AUTO: 69 FL
MONOCYTES # BLD AUTO: 0.4 K/UL
MONOCYTES NFR BLD: 7.8 %
NEUTROPHILS # BLD AUTO: 3.8 K/UL
NEUTROPHILS NFR BLD: 69 %
NRBC BLD-RTO: 0 /100 WBC
PLATELET # BLD AUTO: 306 K/UL
PMV BLD AUTO: 11.1 FL
POTASSIUM SERPL-SCNC: 3.6 MMOL/L
PROT SERPL-MCNC: 7.4 G/DL
RBC # BLD AUTO: 4.42 M/UL
SODIUM SERPL-SCNC: 139 MMOL/L
WBC # BLD AUTO: 5.52 K/UL

## 2018-10-08 PROCEDURE — 99283 EMERGENCY DEPT VISIT LOW MDM: CPT

## 2018-10-08 PROCEDURE — 25000003 PHARM REV CODE 250: Performed by: EMERGENCY MEDICINE

## 2018-10-08 PROCEDURE — 80053 COMPREHEN METABOLIC PANEL: CPT

## 2018-10-08 PROCEDURE — 85025 COMPLETE CBC W/AUTO DIFF WBC: CPT

## 2018-10-08 PROCEDURE — 99284 EMERGENCY DEPT VISIT MOD MDM: CPT | Mod: ,,, | Performed by: EMERGENCY MEDICINE

## 2018-10-08 RX ORDER — CEPHALEXIN 500 MG/1
500 CAPSULE ORAL
Status: COMPLETED | OUTPATIENT
Start: 2018-10-08 | End: 2018-10-08

## 2018-10-08 RX ORDER — HYDROCODONE BITARTRATE AND ACETAMINOPHEN 5; 325 MG/1; MG/1
1 TABLET ORAL
Status: COMPLETED | OUTPATIENT
Start: 2018-10-08 | End: 2018-10-08

## 2018-10-08 RX ORDER — CEPHALEXIN 500 MG/1
500 CAPSULE ORAL 4 TIMES DAILY
Qty: 28 CAPSULE | Refills: 0 | Status: SHIPPED | OUTPATIENT
Start: 2018-10-08 | End: 2018-10-15

## 2018-10-08 RX ORDER — CLINDAMYCIN PHOSPHATE 600 MG/50ML
600 INJECTION, SOLUTION INTRAVENOUS
Status: DISCONTINUED | OUTPATIENT
Start: 2018-10-08 | End: 2018-10-08

## 2018-10-08 RX ADMIN — HYDROCODONE BITARTRATE AND ACETAMINOPHEN 1 TABLET: 5; 325 TABLET ORAL at 01:10

## 2018-10-08 RX ADMIN — CEPHALEXIN 500 MG: 500 CAPSULE ORAL at 04:10

## 2018-10-08 NOTE — ED NOTES
Stu Mitchell, a 43 y.o. male presents to the ED via PMV with CC edema lower extremities      Patient identifiers verified verbally with armband and correct for Stu Mitchell.    LOC/ APPEARANCE: The patient is awake, alert and oriented x 4. Pt is speaking appropriately, no slurred speech. Patient resting comfortably and in no acute distress. Pt is clean and well groomed. No JVD visible. Pt reports pain level of 0. Pt updated on POC. Bed low and locked with side rails up x2, call bell in pt reach.  SKIN: Skin is warm dry and intact, and color is consistent with ethnicity. Capillary refill <3 seconds. No breakdown or brusing visible and mucus membranes moist and acyanotic.  MUSCULOSKELETAL: Full range of motion present in all extremities. Hand  equal and leg strength strong +5 bilaterally.  RESPIRATORY: Airway is open and patent. Respirations-unlabored, regular rate, equal bilaterally on inspiration and expiration. No accessory muscle use noted. Lungs clear to auscultation in all fields bilaterally anterior and posterior.   CARDIAC: Patient has regular heart rate. peripheral edema noted +4 pitting lower extrimies, and patient has no c/o chest pain. Peripheral pulses present equal throughout.  ABDOMEN: Soft and non-tender to palpation with no distention noted. Normoactive bowel sounds x4 quadrants. Pt has no complaints of abnormal bowel movements, denies blood in stool. Pt reports normal appetite.   NEUROLOGIC: Eyes open spontaneously and facial expression symmetrical. Pt behavior appropriate to situation, and pt follows commands.  Pt reports sensation present in all extremities when touched with a finger. PERRLA  : No complaints of frequency, burning, urgency or blood in the urine. No complaints of incontinence.

## 2018-10-08 NOTE — ED PROVIDER NOTES
"Encounter Date: 10/8/2018    SCRIBE #1 NOTE: I, Viviana Herrmann, am scribing for, and in the presence of,  Dr. Reaves. I have scribed the entire note.       History     Chief Complaint   Patient presents with    Foot Problem     sores draining, chills     Time patient was seen by the provider: 1:12 PM    The patient is a 43 y.o. male with co-morbidities including: gout, cellulitis, chronic LE edema, and anemia who presents to the ED with a complaint of worsening BLE erythema and pain for 4 days. He has had multiple ED visits for a similar presentation of cellulitis in the last 2 months. Pt states that it feels like "his skin is going to rip and tear off." He reports that his BLE edema has not worsened from baseline. Endorses a white discharge between his toes, decreased ROM of toes, and chills, but denies fevers. He notes taking 20 mg Lasix daily without much improvement of swelling.      The history is provided by the patient and medical records.     Review of patient's allergies indicates:   Allergen Reactions    Nsaids (non-steroidal anti-inflammatory drug)      Hx of GI bleed     Past Medical History:   Diagnosis Date    Arthritis 10/11/2013    Blood transfusion     Cellulitis     GERD (gastroesophageal reflux disease)     GI bleed     Gout attack     Hypertension     Neuropathy     Osteomyelitis      Past Surgical History:   Procedure Laterality Date    CYST REMOVAL      INCISION AND DRAINAGE OF WOUND      left hand third finger    IRRIGATION AND DEBRIDEMENT RIGHT FINGER  Right 3/7/2016    Performed by Boar [facility] at [facility] 2ND FLR    IRRIGATION AND DEBRIDEMENT, UPPER EXTREMITY Left 10/24/2013    Performed by [facility]an [facility] at [facility] OR 2ND FLR    SURERY ON LEFT MIDDLE FINGER      OSTEOMYLITIS     Family History   Problem Relation Age of Onset    Heart disease Mother     Diabetes Mother     Kidney disease Mother     Hypertension Mother     Stroke Mother     Cancer Mother  "     Social History     Tobacco Use    Smoking status: Never Smoker    Smokeless tobacco: Never Used   Substance Use Topics    Alcohol use: No    Drug use: No     Review of Systems   Constitutional: Positive for chills. Negative for fever.   HENT: Negative for nosebleeds.    Eyes: Negative for visual disturbance.   Respiratory: Negative for wheezing.    Cardiovascular: Positive for leg swelling.   Gastrointestinal: Negative for abdominal pain.   Musculoskeletal: Positive for myalgias.        (+) decreased ROM of toes   Skin: Negative for wound.        (+) BLE erythema  (+) white discharge between toes     Neurological: Negative for speech difficulty.   Psychiatric/Behavioral: Negative for confusion.       Physical Exam     Initial Vitals [10/08/18 1216]   BP Pulse Resp Temp SpO2   (!) 158/72 91 18 98.6 °F (37 °C) 100 %      MAP       --         Physical Exam    Constitutional: He appears well-developed and well-nourished.   HENT:   Head: Normocephalic and atraumatic.   Eyes: Conjunctivae and EOM are normal. Pupils are equal, round, and reactive to light.   Neck: Normal range of motion. Neck supple.   Cardiovascular: Normal rate, regular rhythm, normal heart sounds and normal pulses.   No murmur heard.  Pulmonary/Chest: Breath sounds normal. He has no wheezes. He has no rhonchi. He has no rales.   Abdominal: Soft. There is no tenderness. There is no rebound and no guarding.   Musculoskeletal: He exhibits edema.   Bilateral 3+ edema from the knees down   Neurological: He is alert and oriented to person, place, and time. He has normal strength. No cranial nerve deficit.   Skin: Skin is warm and dry.   Areas of erythema over lower leg and proximal foot bilaterally, with no underlying fluctuance to suggest abscess   Psychiatric: He has a normal mood and affect. His behavior is normal. Thought content normal.         ED Course   Procedures  Labs Reviewed   COMPREHENSIVE METABOLIC PANEL - Abnormal; Notable for the  following components:       Result Value    Glucose 113 (*)     Creatinine 1.5 (*)     ALT 8 (*)     eGFR if non  55.8 (*)     All other components within normal limits   CBC W/ AUTO DIFFERENTIAL - Abnormal; Notable for the following components:    RBC 4.42 (*)     Hemoglobin 8.4 (*)     Hematocrit 30.5 (*)     MCV 69 (*)     MCH 19.0 (*)     MCHC 27.5 (*)     RDW 18.6 (*)     All other components within normal limits          Imaging Results    None          Medical Decision Making:   History:   Old Medical Records: I decided to obtain old medical records.  Initial Assessment:       42 y/o male with a hx of CKD, HTN, and chronic LE edema presents with worsening pain and redness to both legs for 4 days. He has frequent ER visits with similar presentation and is usually d/c with abx for cellulitis. He has had multiple negative DVT studies in the past and has no worsening edema from baseline or asymmetry to suggest DVT now. No systemic symptoms and afebrile and has no other complaints on arrival. Exam concerning for chronic edema and superimposed cellulitis, but no sign of abscess or osteomyelitis. Will check basic labs and reassess.    Basic labs with no elevated WBC or other acute finding, chronic anemia and renal function at baseline.  Per chart review, he has been given Keflex and clindamycin in the past for this.  Patient states that Keflex has worked much better the past with less side effects, so will start this, 1st dose in the ED.  Patient will follow up with PCP for wound check in 2-3 days and return to the ED for any worsening erythema, fevers or any other concerns.      Clinical Tests:   Lab Tests: Ordered and Reviewed                      Clinical Impression:   The encounter diagnosis was Cellulitis of lower extremity, unspecified laterality.      Disposition:   Disposition: Discharged  Condition: Stable                        Dwight Reaves MD  10/09/18 0403

## 2018-10-30 ENCOUNTER — HOSPITAL ENCOUNTER (EMERGENCY)
Facility: HOSPITAL | Age: 43
Discharge: HOME | End: 2018-10-30
Attending: EMERGENCY MEDICINE
Payer: MEDICAID

## 2018-10-30 VITALS
HEIGHT: 69 IN | HEART RATE: 76 BPM | RESPIRATION RATE: 14 BRPM | OXYGEN SATURATION: 99 % | SYSTOLIC BLOOD PRESSURE: 144 MMHG | TEMPERATURE: 98 F | WEIGHT: 177 LBS | BODY MASS INDEX: 26.22 KG/M2 | DIASTOLIC BLOOD PRESSURE: 76 MMHG

## 2018-10-30 DIAGNOSIS — R60.0 BILATERAL LEG EDEMA: Primary | ICD-10-CM

## 2018-10-30 LAB
ALBUMIN SERPL BCP-MCNC: 3.3 G/DL
ALP SERPL-CCNC: 106 U/L
ALT SERPL W/O P-5'-P-CCNC: 8 U/L
ANION GAP SERPL CALC-SCNC: 7 MMOL/L
AST SERPL-CCNC: 15 U/L
BASOPHILS # BLD AUTO: 0.04 K/UL
BASOPHILS NFR BLD: 0.8 %
BILIRUB SERPL-MCNC: 0.2 MG/DL
BUN SERPL-MCNC: 17 MG/DL
CALCIUM SERPL-MCNC: 8.7 MG/DL
CHLORIDE SERPL-SCNC: 108 MMOL/L
CO2 SERPL-SCNC: 24 MMOL/L
CREAT SERPL-MCNC: 1.5 MG/DL
DIFFERENTIAL METHOD: ABNORMAL
EOSINOPHIL # BLD AUTO: 0.2 K/UL
EOSINOPHIL NFR BLD: 4.9 %
ERYTHROCYTE [DISTWIDTH] IN BLOOD BY AUTOMATED COUNT: 18.7 %
EST. GFR  (AFRICAN AMERICAN): >60 ML/MIN/1.73 M^2
EST. GFR  (NON AFRICAN AMERICAN): 55.8 ML/MIN/1.73 M^2
GLUCOSE SERPL-MCNC: 89 MG/DL
HCT VFR BLD AUTO: 26.1 %
HGB BLD-MCNC: 7.2 G/DL
IMM GRANULOCYTES # BLD AUTO: 0 K/UL
IMM GRANULOCYTES NFR BLD AUTO: 0 %
LYMPHOCYTES # BLD AUTO: 1.5 K/UL
LYMPHOCYTES NFR BLD: 31.6 %
MCH RBC QN AUTO: 19.5 PG
MCHC RBC AUTO-ENTMCNC: 27.6 G/DL
MCV RBC AUTO: 71 FL
MONOCYTES # BLD AUTO: 0.6 K/UL
MONOCYTES NFR BLD: 11.9 %
NEUTROPHILS # BLD AUTO: 2.5 K/UL
NEUTROPHILS NFR BLD: 50.8 %
NRBC BLD-RTO: 0 /100 WBC
PLATELET # BLD AUTO: 239 K/UL
PMV BLD AUTO: 10.9 FL
POTASSIUM SERPL-SCNC: 3.9 MMOL/L
PROT SERPL-MCNC: 6.4 G/DL
RBC # BLD AUTO: 3.69 M/UL
SODIUM SERPL-SCNC: 139 MMOL/L
WBC # BLD AUTO: 4.87 K/UL

## 2018-10-30 PROCEDURE — 85025 COMPLETE CBC W/AUTO DIFF WBC: CPT

## 2018-10-30 PROCEDURE — 80053 COMPREHEN METABOLIC PANEL: CPT

## 2018-10-30 PROCEDURE — 25000003 PHARM REV CODE 250: Performed by: STUDENT IN AN ORGANIZED HEALTH CARE EDUCATION/TRAINING PROGRAM

## 2018-10-30 PROCEDURE — 99284 EMERGENCY DEPT VISIT MOD MDM: CPT | Mod: ,,, | Performed by: EMERGENCY MEDICINE

## 2018-10-30 PROCEDURE — 99283 EMERGENCY DEPT VISIT LOW MDM: CPT

## 2018-10-30 RX ORDER — HYDROCODONE BITARTRATE AND ACETAMINOPHEN 5; 325 MG/1; MG/1
1 TABLET ORAL
Status: COMPLETED | OUTPATIENT
Start: 2018-10-30 | End: 2018-10-30

## 2018-10-30 RX ADMIN — HYDROCODONE BITARTRATE AND ACETAMINOPHEN 1 TABLET: 5; 325 TABLET ORAL at 01:10

## 2018-10-30 NOTE — ED TRIAGE NOTES
43 year old male pt presents to the ed with complaints of bilateral leg swelling chronically with increasing pain and swelling noted today. Pt is aox 3 and complains of pain 9/10 bilateral lower extremities. Pt denies chest pain sob.

## 2018-10-30 NOTE — DISCHARGE INSTRUCTIONS
Please keep legs elevated, at or above the level of your chest/heart.  Sleep with pillows under your legs.  Ask your primary care doctor, at [facility] about special compression socks for larger legs.   Keep taking the lasix 20 mg daily to help with the fluid removal.   Try your best to start looking for a job that doesn't require you to be standing all day. I'm sure you can find one. Keep trying!  Keep your toes dry, and put baby powder on your feet and in between your toes, before wearing socks.  Rotate your legs when you are lying down, so that you don't put too much pressure on one part of the leg.  Keep your legs as clean as you can, because an infection in your leg will be difficult to heal/cure.  Finally, please read the information in the paperwork about venous insufficiency and lymphedema.

## 2018-10-30 NOTE — ED PROVIDER NOTES
"Encounter Date: 10/30/2018       History     Chief Complaint   Patient presents with    Leg Swelling     Pt states "The skin on my feet is coming off and they are all swollen, I think they are infected". Pt's feet red, swollen, hot to touch, foul odor, small skin tears. Pt denies fever, vomiting, numbness/tingling to BLE. Pt reports 9/10 pain to both feet. Pt also reports increased swelling to BLE which is chronic.     Foot Pain     Pt ambulatory.      HPI   43 y.o. M p/w BL foot and leg pain. Has been seen her multiple times for the same issues, however says that for the past week, his legs have become significantly more edematous. He endorses the same burning pain, that is usually relieved with norco in the ED. His skin continues to be dry in his feet, with skin coming off. This was previously noted in Epic, and patient denies any new changes.   He's tried 1000 mg tylenol today, without relief.   Denies fevers, actual joint pain, SOB, CP.  He has tried using compression stockings in the past, however says that his legs become too swollen to take them off.   He has tried multiple rounds of abx in the past, most recently this month, however erythema on BLLE persists.  DVT u/s performed twice in March 2018 showed no DVT, and no reflux that would suggest venous incompetence.         Review of patient's allergies indicates:   Allergen Reactions    Nsaids (non-steroidal anti-inflammatory drug)      Hx of GI bleed     Past Medical History:   Diagnosis Date    Arthritis 10/11/2013    Blood transfusion     Cellulitis     GERD (gastroesophageal reflux disease)     GI bleed     Gout attack     Hypertension     Neuropathy     Osteomyelitis      Past Surgical History:   Procedure Laterality Date    CYST REMOVAL      INCISION AND DRAINAGE OF WOUND      left hand third finger    IRRIGATION AND DEBRIDEMENT RIGHT FINGER  Right 3/7/2016    Performed by B[facility] Cruz MD at [facility] OR [address] [place] [address]    IRRIGATION AND " DEBRIDEMENT, UPPER EXTREMITY Left 10/24/2013    Performed by Alonso S[facility]ivasan [facility] at [facility] OR [address] [place] [address] ON LEFT MIDDLE FINGER      OSTEOMYLITIS     Family History   Problem Relation Age of Onset    Heart disease Mother     Diabetes Mother     Kidney disease Mother     Hypertension Mother     Stroke Mother     Cancer Mother      Social History     Tobacco Use    Smoking status: Never Smoker    Smokeless tobacco: Never Used   Substance Use Topics    Alcohol use: No    Drug use: No     Review of Systems   Constitutional: Negative for chills, diaphoresis and fever.   HENT: Negative for rhinorrhea, sore throat and trouble swallowing.    Eyes: Negative for pain and visual disturbance.   Respiratory: Negative for cough and chest tightness.    Cardiovascular: Negative for chest pain and palpitations.   Gastrointestinal: Negative for blood in stool and constipation.   Genitourinary: Negative for dysuria, frequency and hematuria.   Musculoskeletal: Negative for back pain and neck stiffness.   Skin: Negative for rash and wound.   Neurological: Negative for seizures, syncope and weakness.   All other systems reviewed and are negative.      Physical Exam     Initial Vitals [10/30/18 0051]   BP Pulse Resp Temp SpO2   (!) 143/72 97 17 97.7 °F (36.5 °C) 100 %      MAP       --         Physical Exam    Nursing note and vitals reviewed.  Constitutional: He appears well-developed. He is not diaphoretic. No distress.   HENT:   Head: Normocephalic and atraumatic.   Nose: Nose normal.   Mouth/Throat: Oropharynx is clear and moist.   Eyes: Conjunctivae and EOM are normal. Pupils are equal, round, and reactive to light.   Neck: Normal range of motion. Neck supple.   Cardiovascular: Normal heart sounds.   No murmur heard.  Pulses:       Dorsalis pedis pulses are 2+ on the right side, and 2+ on the left side.   1+ DP pulses, hard to palpate through the edema (see pictures).    Pulmonary/Chest: Breath sounds normal. No  "respiratory distress.   Abdominal: Soft. Normal appearance and bowel sounds are normal. He exhibits no distension. There is no tenderness.   Musculoskeletal: He exhibits edema (3+ BLLE to knee) and tenderness (BLLE).   Neurological: He is alert and oriented to person, place, and time. He has normal strength. No sensory deficit. He exhibits normal muscle tone.   Skin: Skin is warm and dry. No pallor.                   ED Course   Procedures  Labs Reviewed - No data to display        HO-III MDM  43 y.o.male presents with BLLE edema and pain, chronic. Hx of leg edema; no DVT's in the past. Phys exam was as above, see pictures. Comparing the pictures from the note on 7/02/18 to today's presentation, his condition is definitely worsening.   DDX includes: venous insufficiency, lymphedema, cellulitis considered, given superficial open wounds. DVT considered, however he has not cardiopulm sx's, his vitals are stable, no unilateral preference of swelling.   No indurations on exam, therefore large abscess unlikely.  Nec fasc considered, however his bullae on toes are not hemorrhagic, no paresthesias, no tight skin, no hx of DM or HIV, and he is hemodynamically stable.   Work up and treatment include CBC, CMP  Norco 5-325 for pain.     Pt's pain was relieved with norco.  Results showed no leukocytosis or renal injury.   I have explained this and the future recommendations for continuing his care at [facility].   P"lease keep legs elevated, at or above the level of your chest/heart.  Sleep with pillows under your legs.  Ask your primary care doctor, at [facility] about special compression socks for larger legs.   Keep taking the lasix 20 mg daily to help with the fluid removal.   Try your best to start looking for a job that doesn't require you to be standing all day. I'm sure you can find one. Keep trying!  Keep your toes dry, and put baby powder on your feet and in between your toes, before wearing socks.  Rotate your " "legs when you are lying down, so that you don't put too much pressure on one part of the leg.  Keep your legs as clean as you can, because an infection in your leg will be difficult to heal/cure.  Finally, please read the information in the paperwork about venous insufficiency and lymphedema."      Pt is aware of plan and is amenable.     "CJ" Vignesh Hanson M.D.  [facility] EMERGENCY MEDICINE PGY-2  2:46 AM 10/30/2018      Imaging Results    None                               Clinical Impression:   The encounter diagnosis was Bilateral leg edema.                             Vignesh Hanson MD  Resident  10/30/18 0244    "

## 2018-11-12 ENCOUNTER — HOSPITAL ENCOUNTER (EMERGENCY)
Facility: HOSPITAL | Age: 43
Discharge: HOME | End: 2018-11-12
Attending: EMERGENCY MEDICINE
Payer: MEDICAID

## 2018-11-12 VITALS
WEIGHT: 176 LBS | HEART RATE: 97 BPM | HEIGHT: 65 IN | BODY MASS INDEX: 29.32 KG/M2 | TEMPERATURE: 97 F | OXYGEN SATURATION: 100 % | DIASTOLIC BLOOD PRESSURE: 79 MMHG | SYSTOLIC BLOOD PRESSURE: 165 MMHG | RESPIRATION RATE: 18 BRPM

## 2018-11-12 DIAGNOSIS — M79.644 PAIN IN FINGER OF RIGHT HAND: Primary | ICD-10-CM

## 2018-11-12 DIAGNOSIS — M10.9 GOUT, UNSPECIFIED CAUSE, UNSPECIFIED CHRONICITY, UNSPECIFIED SITE: ICD-10-CM

## 2018-11-12 PROCEDURE — 99282 EMERGENCY DEPT VISIT SF MDM: CPT | Mod: ,,, | Performed by: EMERGENCY MEDICINE

## 2018-11-12 PROCEDURE — 29130 APPL FINGER SPLINT STATIC: CPT

## 2018-11-12 PROCEDURE — 99283 EMERGENCY DEPT VISIT LOW MDM: CPT

## 2018-11-13 NOTE — ED PROVIDER NOTES
"Encounter Date: 11/12/2018    SCRIBE #1 NOTE: I, Areli Uribejohn, am scribing for, and in the presence of,  Dr. Spence. I have scribed the entire note.       History     Chief Complaint   Patient presents with    Hand Pain     pt has swelling and pain to tip of R middle finger; states he has had infections in this area before and he burnt it on a hot pan a few weeks ago at work; no broken skin noted     Time seen by provider: 9:02 PM    This is a 43 y.o. male with medical conditions including GERD, gout attack, HTN, cellulitis, neuropathy, arthritis, osteomyelitis, and GI bleed, who presents with complaint of right hand pain. Patient has been experiencing a "sticking and stabbing" pain to his right third finger after burning it at work approximately 2 weeks ago. Patient endorses third finger swelling. Patient denies additional trauma to painful area, extremity numbness, or extremity weakness. Reports hx of gout. Patient compliant with at home medications, including colchicine.        The history is provided by the patient.     Review of patient's allergies indicates:   Allergen Reactions    Nsaids (non-steroidal anti-inflammatory drug)      Hx of GI bleed     Past Medical History:   Diagnosis Date    Arthritis 10/11/2013    Blood transfusion     Cellulitis     GERD (gastroesophageal reflux disease)     GI bleed     Gout attack     Hypertension     Neuropathy     Osteomyelitis      Past Surgical History:   Procedure Laterality Date    CYST REMOVAL      INCISION AND DRAINAGE OF WOUND      left hand third finger    IRRIGATION AND DEBRIDEMENT RIGHT FINGER  Right 3/7/2016    Performed by [facility] at [facility] OR [facility]    IRRIGATION AND DEBRIDEMENT, UPPER EXTREMITY Left 10/24/2013    Performed by Alonso Srinivasan MD at [facility] OR [facility]    SURERY ON LEFT MIDDLE FINGER      OSTEOMYLITIS     Family History   Problem Relation Age of Onset    Heart disease Mother     Diabetes Mother     Kidney " disease Mother     Hypertension Mother     Stroke Mother     Cancer Mother      Social History     Tobacco Use    Smoking status: Never Smoker    Smokeless tobacco: Never Used   Substance Use Topics    Alcohol use: No    Drug use: No     Review of Systems   Constitutional: Negative for chills and fever.   HENT: Negative for facial swelling and rhinorrhea.    Eyes: Negative for visual disturbance.   Respiratory: Negative for cough and shortness of breath.    Cardiovascular: Negative for chest pain.   Gastrointestinal: Negative for abdominal pain and diarrhea.   Genitourinary: Negative for dysuria and flank pain.   Musculoskeletal: Negative for back pain and gait problem.        Third right finger pain and swelling   Neurological: Negative for weakness and numbness.   Psychiatric/Behavioral: Negative for behavioral problems and confusion.       Physical Exam     Initial Vitals [11/12/18 2017]   BP Pulse Resp Temp SpO2   (!) 165/79 97 18 97.2 °F (36.2 °C) 100 %      MAP       --         Physical Exam    Nursing note and vitals reviewed.  Constitutional: He appears well-developed and well-nourished. No distress.   Cardiovascular: Normal rate, regular rhythm, normal heart sounds and intact distal pulses. Exam reveals no gallop and no friction rub.    No murmur heard.  Pulmonary/Chest: Breath sounds normal. No respiratory distress.   Abdominal: Soft. He exhibits no distension. There is no tenderness.   Musculoskeletal:   Right hand third finger distal volar tuft with small amount of what appears to be calcification of the DIP joint. Tenderness to palpation over that area. No signs of erythema or swelling of the digit. Normal flexion and extension of the digit. No sign of tenosynovitis.    Neurological: He is alert and oriented to person, place, and time. He has normal strength. No cranial nerve deficit or sensory deficit.   Skin: Skin is warm and dry. Capillary refill takes less than 2 seconds.         ED Course    Procedures  Labs Reviewed - No data to display       Imaging Results    None          Medical Decision Making:   History:   Old Medical Records: I decided to obtain old medical records.  Initial Assessment:   43 y.o.male with hx of gout. Most likely has calcification in DIP joint. No signs of inflammation or infection. Will treat symptomatically. Applied aluminnum finger splint to area and encouraged patient to follow up with his rheumatologist.             Scribe Attestation:   Scribe #1: I performed the above scribed service and the documentation accurately describes the services I performed. I attest to the accuracy of the note.               Clinical Impression:   The primary encounter diagnosis was Pain in finger of right hand. A diagnosis of Gout, unspecified cause, unspecified chronicity, unspecified site was also pertinent to this visit.      Disposition:   Disposition: Discharged  Condition: Stable                        Harsha Spence MD  11/13/18 0138

## 2018-11-13 NOTE — ED TRIAGE NOTES
"Pt reports pain to R middle finger, reports burning it on a pan at work "a few weeks ago" and over the last few days has had increased swelling and "stabbing" pain to the area. Reports hx of infection and gout in the finger. No redness noted to finger.    Patient Identifiers for Stu Mitchell checked and correct  LOC: The patient is awake, alert and aware of environment with an appropriate affect, the patient is oriented x 3 and speaking appropriate.  APPEARANCE: Patient resting comfortably and in no acute distress, patient is clean and well groomed, patient's clothing is properly fastened.  SKIN: The skin is warm and dry, patient has normal skin turgor and moist mucus membranes,no rashes or lesions.Skin Intact , No Breakdown Noted  Musculoskeletal :  Normal range of motion noted. Moves all extremeties well, swelling noted to R middle finger, no redness noted  RESPIRATORY: Airway is open and patent, respirations are spontaneous, patient has a normal effort and rate.  CARDIAC: Patient has a normal rate and rhythm, no periphreal edema noted, capillary refill < 3 seconds.   ABDOMEN: Soft and non tender to palpation, no distention noted.   PULSES: 2+  And symmetrical in all extremeties  NEUROLOGIC: PERRL. facial expression is symmetrical, patient moving all extremities, normal sensation in all extremities when touched with a finger.The patient is awake, alert and cooperative with a calm affect, patient is aware of environment.    Will continue to monitor    "

## 2018-12-01 ENCOUNTER — HOSPITAL ENCOUNTER (EMERGENCY)
Facility: HOSPITAL | Age: 43
Discharge: HOME | End: 2018-12-01
Attending: EMERGENCY MEDICINE
Payer: MEDICAID

## 2018-12-01 VITALS
HEART RATE: 74 BPM | BODY MASS INDEX: 31.32 KG/M2 | OXYGEN SATURATION: 100 % | DIASTOLIC BLOOD PRESSURE: 65 MMHG | HEIGHT: 65 IN | SYSTOLIC BLOOD PRESSURE: 136 MMHG | TEMPERATURE: 98 F | WEIGHT: 188 LBS | RESPIRATION RATE: 18 BRPM

## 2018-12-01 DIAGNOSIS — M79.605 LEFT LEG PAIN: ICD-10-CM

## 2018-12-01 DIAGNOSIS — R60.0 BILATERAL LEG EDEMA: ICD-10-CM

## 2018-12-01 DIAGNOSIS — R60.0 EDEMA, LOWER EXTREMITY: ICD-10-CM

## 2018-12-01 LAB
ALBUMIN SERPL BCP-MCNC: 3.4 G/DL
ALP SERPL-CCNC: 128 U/L
ALT SERPL W/O P-5'-P-CCNC: 8 U/L
ANION GAP SERPL CALC-SCNC: 9 MMOL/L
AST SERPL-CCNC: 13 U/L
BASOPHILS # BLD AUTO: 0.05 K/UL
BASOPHILS NFR BLD: 1.2 %
BILIRUB SERPL-MCNC: 0.3 MG/DL
BILIRUB UR QL STRIP: NEGATIVE
BNP SERPL-MCNC: 13 PG/ML
BUN SERPL-MCNC: 22 MG/DL
CALCIUM SERPL-MCNC: 9.1 MG/DL
CHLORIDE SERPL-SCNC: 104 MMOL/L
CLARITY UR REFRACT.AUTO: CLEAR
CO2 SERPL-SCNC: 28 MMOL/L
COLOR UR AUTO: NORMAL
CREAT SERPL-MCNC: 1.9 MG/DL
D DIMER PPP IA.FEU-MCNC: 0.84 MG/L FEU
DIFFERENTIAL METHOD: ABNORMAL
EOSINOPHIL # BLD AUTO: 0.2 K/UL
EOSINOPHIL NFR BLD: 4.6 %
ERYTHROCYTE [DISTWIDTH] IN BLOOD BY AUTOMATED COUNT: 18.1 %
EST. GFR  (AFRICAN AMERICAN): 48.5 ML/MIN/1.73 M^2
EST. GFR  (NON AFRICAN AMERICAN): 41.9 ML/MIN/1.73 M^2
GLUCOSE SERPL-MCNC: 84 MG/DL
GLUCOSE UR QL STRIP: NEGATIVE
HCT VFR BLD AUTO: 28.1 %
HGB BLD-MCNC: 7.9 G/DL
HGB UR QL STRIP: NEGATIVE
IMM GRANULOCYTES # BLD AUTO: 0.01 K/UL
IMM GRANULOCYTES NFR BLD AUTO: 0.2 %
KETONES UR QL STRIP: NEGATIVE
LEUKOCYTE ESTERASE UR QL STRIP: NEGATIVE
LYMPHOCYTES # BLD AUTO: 1.2 K/UL
LYMPHOCYTES NFR BLD: 29.8 %
MCH RBC QN AUTO: 19.3 PG
MCHC RBC AUTO-ENTMCNC: 28.1 G/DL
MCV RBC AUTO: 69 FL
MONOCYTES # BLD AUTO: 0.4 K/UL
MONOCYTES NFR BLD: 9.5 %
NEUTROPHILS # BLD AUTO: 2.2 K/UL
NEUTROPHILS NFR BLD: 54.7 %
NITRITE UR QL STRIP: NEGATIVE
NRBC BLD-RTO: 0 /100 WBC
PH UR STRIP: 6 [PH] (ref 5–8)
PLATELET # BLD AUTO: 216 K/UL
PMV BLD AUTO: ABNORMAL FL
POTASSIUM SERPL-SCNC: 4.1 MMOL/L
PROT SERPL-MCNC: 6.8 G/DL
PROT UR QL STRIP: NEGATIVE
RBC # BLD AUTO: 4.09 M/UL
SODIUM SERPL-SCNC: 141 MMOL/L
SP GR UR STRIP: 1.01 (ref 1–1.03)
URN SPEC COLLECT METH UR: NORMAL
WBC # BLD AUTO: 4.09 K/UL

## 2018-12-01 PROCEDURE — 81003 URINALYSIS AUTO W/O SCOPE: CPT

## 2018-12-01 PROCEDURE — 80053 COMPREHEN METABOLIC PANEL: CPT

## 2018-12-01 PROCEDURE — 85025 COMPLETE CBC W/AUTO DIFF WBC: CPT

## 2018-12-01 PROCEDURE — 85379 FIBRIN DEGRADATION QUANT: CPT

## 2018-12-01 PROCEDURE — 99285 EMERGENCY DEPT VISIT HI MDM: CPT | Mod: 25

## 2018-12-01 PROCEDURE — 25000003 PHARM REV CODE 250: Performed by: PHYSICIAN ASSISTANT

## 2018-12-01 PROCEDURE — 99283 EMERGENCY DEPT VISIT LOW MDM: CPT | Mod: ,,, | Performed by: PHYSICIAN ASSISTANT

## 2018-12-01 PROCEDURE — 83880 ASSAY OF NATRIURETIC PEPTIDE: CPT

## 2018-12-01 RX ORDER — HYDROCODONE BITARTRATE AND ACETAMINOPHEN 5; 325 MG/1; MG/1
1 TABLET ORAL
Status: COMPLETED | OUTPATIENT
Start: 2018-12-01 | End: 2018-12-01

## 2018-12-01 RX ORDER — METOPROLOL TARTRATE 50 MG/1
50 TABLET ORAL
Status: COMPLETED | OUTPATIENT
Start: 2018-12-01 | End: 2018-12-01

## 2018-12-01 RX ADMIN — HYDROCODONE BITARTRATE AND ACETAMINOPHEN 1 TABLET: 5; 325 TABLET ORAL at 07:12

## 2018-12-01 RX ADMIN — METOPROLOL TARTRATE 50 MG: 50 TABLET ORAL at 07:12

## 2018-12-01 NOTE — DISCHARGE INSTRUCTIONS
You were seen and evaluated in the ED for swelling of bilateral lower extremity. Your ultrasound was negative for blood clot. Your kidney function is abnormal so we recommend you do not take your fluid pill (lasix) until you see your PCP Wednesday, as scheduled. Please wear compression stocking or wrap legs with ACE wrap from foot up to knee. Please return to the ED with any new or worsening symptoms like chest pain, shortness of breath, numbness in lower extremities or any new concerns.

## 2018-12-01 NOTE — ED PROVIDER NOTES
Encounter Date: 12/1/2018       History     Chief Complaint   Patient presents with    Leg Swelling     pt complains of leg swelling x 3 days and complains of pain to the left leg behind the knee 9/10. pt is aox 3. pt denies chest pain, sob but does have nausea.     43 y.o. M with PMH of GERD, HTN, Arthritis, Gout, GI blered, Chronic LE edema and cellulitis presents to the ED with chief complaint of B lower extremity edema above his baseline and left calf pain x 3 days. He has been seen in the ED multiple times with a similar presentation. He states that the edema of B lower extremity is slightly worse than his baseline, and he only endorses pain in the left posterior calf. No pain in right lower extremity. There is bilateral LE erythema extending from the ankle to the knee with associated excoriations, no pus or signs of cellulitis.  He is able to ambulate and has full ROM of B ankles and knees. He denies joint pain, CP, SOB, nausea, vomiting, fever, chills, dysuria, or abdominal pain.           Review of patient's allergies indicates:   Allergen Reactions    Nsaids (non-steroidal anti-inflammatory drug)      Hx of GI bleed     Past Medical History:   Diagnosis Date    Arthritis 10/11/2013    Blood transfusion     Cellulitis     GERD (gastroesophageal reflux disease)     GI bleed     Gout attack     Hypertension     Neuropathy     Osteomyelitis      Past Surgical History:   Procedure Laterality Date    CYST REMOVAL      INCISION AND DRAINAGE OF WOUND      left hand third finger    IRRIGATION AND DEBRIDEMENT RIGHT FINGER  Right 3/7/2016    [facility]formed [facility] at [facility]ND [facility]LR    IRRIGATION AND DEBRIDEMENT, UPPER EXTREMITY Left 10/24/2013    [facility] at [facility] 2ND FLR    SURERY ON LEFT MIDDLE FINGER      OSTEOMYLITIS     Family History   Problem Relation Age of Onset    Heart disease Mother     Diabetes Mother     Kidney disease Mother     Hypertension Mother      Stroke Mother     Cancer Mother      Social History     Tobacco Use    Smoking status: Never Smoker    Smokeless tobacco: Never Used   Substance Use Topics    Alcohol use: No    Drug use: No     Review of Systems   Constitutional: Negative for chills, fatigue and fever.   HENT: Negative for congestion, nosebleeds and sore throat.    Eyes: Negative for pain and visual disturbance.   Respiratory: Negative for apnea, cough, chest tightness, shortness of breath and wheezing.    Cardiovascular: Positive for leg swelling (Bilateral). Negative for chest pain and palpitations.   Gastrointestinal: Negative for abdominal distention, abdominal pain, nausea and vomiting.   Genitourinary: Negative for difficulty urinating and dysuria.   Musculoskeletal: Positive for gait problem and joint swelling (bilateral ankle).   Skin: Positive for rash (Excoriations of Bilateral LE) and wound (excorations of BLE).   Neurological: Negative for dizziness, light-headedness and headaches.   Psychiatric/Behavioral: Negative for agitation and confusion. The patient is not nervous/anxious.        Physical Exam     Initial Vitals [12/01/18 0457]   BP Pulse Resp Temp SpO2   (!) 147/69 69 20 98 °F (36.7 °C) 99 %      MAP       --         Physical Exam    Nursing note and vitals reviewed.  Constitutional: He appears well-developed and well-nourished. No distress.   HENT:   Head: Normocephalic and atraumatic.   Eyes: Conjunctivae and EOM are normal. Pupils are equal, round, and reactive to light.   Neck: Normal range of motion.   Cardiovascular: Normal rate, regular rhythm, normal heart sounds and intact distal pulses.   Pulses:       Popliteal pulses are 1+ on the right side, and 1+ on the left side.        Dorsalis pedis pulses are 1+ on the right side, and 1+ on the left side.        Posterior tibial pulses are 1+ on the right side, and 1+ on the left side.   Pulmonary/Chest: Breath sounds normal. No respiratory distress. He has no  wheezes. He has no rales.   Abdominal: Soft. Bowel sounds are normal. There is no tenderness.   Musculoskeletal:        Right ankle: He exhibits decreased range of motion and swelling. He exhibits no ecchymosis, no deformity and no laceration.        Left ankle: He exhibits decreased range of motion and swelling. He exhibits no ecchymosis and no deformity.        Right lower leg: He exhibits tenderness and edema.        Left lower leg: He exhibits tenderness, edema and deformity. He exhibits no swelling and no laceration.   BLE 3+ pitting edema from ankle to knee   Neurological: He is alert and oriented to person, place, and time.   Skin: Skin is warm and dry. Rash (BLE with excorations) noted. No abscess noted. There is erythema (BLE from ankle to knee). No pallor.   Psychiatric: He has a normal mood and affect.             ED Course   Procedures  Labs Reviewed   COMPREHENSIVE METABOLIC PANEL - Abnormal; Notable for the following components:       Result Value    BUN, Bld 22 (*)     Creatinine 1.9 (*)     Albumin 3.4 (*)     ALT 8 (*)     eGFR if  48.5 (*)     eGFR if non  41.9 (*)     All other components within normal limits   CBC W/ AUTO DIFFERENTIAL - Abnormal; Notable for the following components:    RBC 4.09 (*)     Hemoglobin 7.9 (*)     Hematocrit 28.1 (*)     MCV 69 (*)     MCH 19.3 (*)     MCHC 28.1 (*)     RDW 18.1 (*)     All other components within normal limits   D DIMER, QUANTITATIVE - Abnormal; Notable for the following components:    D-Dimer 0.84 (*)     All other components within normal limits    Narrative:     ADD ON BNP PER DR. SHELLIE LAZARO AT  12/01/2018  08:24 (CBCWD)  ADD ON DDIMR PER DR. SHELLIE LAZARO AT  12/01/2018  09:44 (BLUEX)   URINALYSIS, REFLEX TO URINE CULTURE    Narrative:     Preferred Collection Type->Urine, Clean Catch  CUP ONLY  12/01/2018  09:55    B-TYPE NATRIURETIC PEPTIDE   B-TYPE NATRIURETIC PEPTIDE    Narrative:     ADD ON BNP PER  DR. SHELLIE LAZARO AT  12/01/2018  08:24 (CBCWD)   D DIMER, QUANTITATIVE          Imaging Results          US Lower Extremity Veins Bilateral (Final result)  Result time 12/01/18 15:01:35    Final result by Wagner Elizondo MD (12/01/18 15:01:35)                 Impression:      No evidence of deep venous thrombosis in either lower extremity.    Electronically signed by resident: Bora Salazar  Date:    12/01/2018  Time:    14:36    Electronically signed by: Wagner Elizondo MD  Date:    12/01/2018  Time:    15:01             Narrative:    EXAMINATION:  US LOWER EXTREMITY VEINS BILATERAL    CLINICAL HISTORY:  Localized edema    TECHNIQUE:  Duplex and color flow Doppler and dynamic compression was performed of the bilateral lower extremity veins was performed.    COMPARISON:  None.    FINDINGS:  Right thigh veins: The common femoral, femoral, popliteal, upper greater saphenous, and deep femoral veins are patent and free of thrombus. The veins are normally compressible and have normal phasic flow and augmentation response.    Right calf veins: The visualized calf veins are patent.    Left thigh veins: The common femoral, femoral, popliteal, upper greater saphenous, and deep femoral veins are patent and free of thrombus. The veins are normally compressible and have normal phasic flow and augmentation response.    Left calf veins: The visualized calf veins are patent.    Miscellaneous: None.                               X-Ray Chest PA And Lateral (Final result)  Result time 12/01/18 08:28:33    Final result by Wagner Elizondo MD (12/01/18 08:28:33)                 Impression:      No acute abnormality.    Multiple healed rib fractures bilaterally.      Electronically signed by: Wagner Elizondo MD  Date:    12/01/2018  Time:    08:28             Narrative:    EXAMINATION:  XR CHEST PA AND LATERAL    CLINICAL HISTORY:  Localized edema    TECHNIQUE:  PA and lateral views of the chest were  performed.    COMPARISON:  None    Chest radiograph from 10/30/2017    FINDINGS:  The lungs are clear, with normal appearance of pulmonary vasculature and no pleural effusion or pneumothorax.    The cardiac silhouette is normal in size. The hilar and mediastinal contours are unremarkable.    Multiple healed rib fractures bilaterally.  Extension waited kyphosis of the thoracic spine.                                 Medical Decision Making:   Initial Assessment:   43 y.o. M with PMH of GERD, HTN Gout, arthritis, cellulitis, chronic B LE edema and GI bleed. Presents today with chief complaint of B LE edema and lef calf pain x 3 days. On exam, he has 3+ pitting edema of B LE with associated erythema of B LE for ankle to knee. He has a rash with open wounds of B lower extremity that he reports as a chronic issue. He reports pain of 8/10 currently and states 1000mg tylenol last night offered no relief of pain. He reports his PCP increased his lasix from 20mg PO daily to 20mg PO twice daioy with recent increased lower extremity edema, has follow up appt with PCP this Wednesday, 12/05/2018. He denies joint SOB, CP, nausea, vomiting, fever, chills, paraesthesias or dysuria.     7:28 AM CBC, CMP and B LE U/S ordered. Patient BP 0f 147/69, will give a.m. dose of Metoprolol 50mg now and Norco 5-325mg PO x 1 for pain 8/10. Will continue to monitor.    12:43 PM D-Dimer of 0.84, discussed findings with patient. Elevated BUN 22/Creatinine 1.9. Will not give Lasix as previously discussed with patient. Advised that B lower extremity ultrasound was ordered. Patient voiced understanding. Will continue to monitor.      3:21 PM Spoke with patient in [facility], informed of negative BLE U/S. Discussed abnormal kidney function with BUN 22 and Creatine 1.9, recommend he hold his Lasix until his follow up appt this Wednesday with his PCP at [facility] at which time they can re check his kidney function. Advised to wear lower extremity compression  stockings if possible, if he is unable to wear the compression stockings I recommend he wrap BLE with ACE wrap starting from the foot up the knee. Offered to provide ACE wraps but states he has them at home. He was advised to return to the ED with any new or worsening symptoms, any chest pain, SOB, numbness, paresthesias, fever, chills, or signs of infection. Patient voiced understanding and plans to follow up with his PCP Wednesday, 12/05/2018.       I discussed the care of this patient with my supervising MD.    Differential Diagnosis:   Cellulitis/Dermatitis, renal insufficiency, DVT, venous stasis                   ED Course as of Dec 01 1557   Sat Dec 01, 2018   1329 US Lower Extremity Veins Bilateral [EL]      ED Course User Index  [EL] AILYN Millan     Clinical Impression:   Diagnoses of Left leg pain, Bilateral leg edema, and Edema, lower extremity were pertinent to this visit.      Disposition:   Disposition: Discharged  Condition: Stable                        AILYN Millan  12/01/18 [id number]

## 2018-12-01 NOTE — ED NOTES
Edema to lower extremities 3+ edema noted stated having pain in Lft. calf area started 2-3 days ago.

## 2018-12-08 NOTE — PROVIDER PROGRESS NOTES - EMERGENCY DEPT.
Encounter Date: 10/30/2018    ED Physician Progress Notes        Physician Note:   I personally evaluated the pt and agree with residents hpi/ros/pe and mdm

## 2019-01-18 ENCOUNTER — HOSPITAL ENCOUNTER (EMERGENCY)
Facility: HOSPITAL | Age: 44
Discharge: HOME | End: 2019-01-18
Attending: EMERGENCY MEDICINE
Payer: MEDICAID

## 2019-01-18 VITALS
RESPIRATION RATE: 18 BRPM | OXYGEN SATURATION: 100 % | WEIGHT: 174 LBS | HEIGHT: 69 IN | DIASTOLIC BLOOD PRESSURE: 78 MMHG | SYSTOLIC BLOOD PRESSURE: 175 MMHG | HEART RATE: 97 BPM | TEMPERATURE: 99 F | BODY MASS INDEX: 25.77 KG/M2

## 2019-01-18 DIAGNOSIS — M1A.9XX1 GOUTY TOPHI: Primary | ICD-10-CM

## 2019-01-18 PROCEDURE — 99284 PR EMERGENCY DEPT VISIT,LEVEL IV: ICD-10-PCS | Mod: ,,, | Performed by: EMERGENCY MEDICINE

## 2019-01-18 PROCEDURE — 99283 EMERGENCY DEPT VISIT LOW MDM: CPT

## 2019-01-18 PROCEDURE — 99284 EMERGENCY DEPT VISIT MOD MDM: CPT | Mod: ,,, | Performed by: EMERGENCY MEDICINE

## 2019-01-18 NOTE — DISCHARGE INSTRUCTIONS
- your x-ray is normal  - continue pain medication as prescribed by PCP.  - there is no evidence of infection  - follow-up with PCP or return to ER if symptoms become worse.

## 2019-01-18 NOTE — ED PROVIDER NOTES
"Encounter Date: 1/18/2019    SCRIBE #1 NOTE: I, Kamaljit Sugey, am scribing for, and in the presence of,  Viviana Alfaro MD. I have scribed the entire note.       History     Chief Complaint   Patient presents with    Finger Pain     43 y.o. male patient with PMHx of GERD, gout, HTN, who presents to the Emergency Department for right 3rd digit pain with onset 1 weeks ago. Pt describes the pain as a "sticking and burning." Pain is rated at 9/10 in severity. Pt has taken Tylenol and tramadol without relief. Pt denies any fever, N/V/D, CP, SOB, and all other sxs at this time. No further complaints or concerns at this time.      The history is provided by the patient.     Review of patient's allergies indicates:   Allergen Reactions    Nsaids (non-steroidal anti-inflammatory drug)      Hx of GI bleed     Past Medical History:   Diagnosis Date    Arthritis 10/11/2013    Blood transfusion     Cellulitis     GERD (gastroesophageal reflux disease)     GI bleed     Gout attack     Hypertension     Neuropathy     Osteomyelitis      Past Surgical History:   Procedure Laterality Date    CYST REMOVAL      INCISION AND DRAINAGE OF WOUND      left hand third finger    IRRIGATION AND DEBRIDEMENT RIGHT FINGER  Right 3/7/2016    Performed by B[facility] Cruz MD at [facility] OR 2ND FLR    IRRIGATION AND DEBRIDEMENT, UPPER EXTREMITY Left 10/24/2013    Performed by Alonso Srinivasan MD at [facility] OR 2ND FLR    SURERY ON LEFT MIDDLE FINGER      OSTEOMYLITIS     Family History   Problem Relation Age of Onset    Heart disease Mother     Diabetes Mother     Kidney disease Mother     Hypertension Mother     Stroke Mother     Cancer Mother      Social History     Tobacco Use    Smoking status: Never Smoker    Smokeless tobacco: Never Used   Substance Use Topics    Alcohol use: No    Drug use: No     Review of Systems   Constitutional: Negative for fever.   HENT: Negative for sore throat.    Respiratory: Negative for shortness of " breath.    Cardiovascular: Negative for chest pain.   Gastrointestinal: Negative for diarrhea, nausea and vomiting.   Genitourinary: Negative for dysuria.   Musculoskeletal: Negative for back pain.        Right 3rd digit pain   Skin: Negative for rash.   Neurological: Negative for weakness.   Hematological: Does not bruise/bleed easily.       Physical Exam     Initial Vitals [01/18/19 0937]   BP Pulse Resp Temp SpO2   (!) 175/78 97 18 98.7 °F (37.1 °C) 100 %      MAP       --         Physical Exam    Nursing note and vitals reviewed.  Constitutional: He appears well-developed and well-nourished. He is not diaphoretic. No distress.   HENT:   Head: Normocephalic and atraumatic.   Mouth/Throat: Oropharynx is clear and moist.   Neck: Normal range of motion. Neck supple. No JVD present.   Cardiovascular: Normal rate, regular rhythm, normal heart sounds and intact distal pulses.   Pulmonary/Chest: Breath sounds normal. No respiratory distress. He has no wheezes. He has no rhonchi. He has no rales.   Abdominal: Soft. He exhibits no distension. There is no tenderness.   Musculoskeletal: Normal range of motion. He exhibits edema (distal 3rd digit) and tenderness (distal 3rd digit).        Right hand: Right index finger: Exhibits deformity (deformity at DIP and phalanx with tophi).   Lymphadenopathy:     He has no cervical adenopathy.   Neurological: He is alert and oriented to person, place, and time. He has normal strength. No cranial nerve deficit or sensory deficit.   Skin: Skin is warm and dry.         ED Course   Procedures  Labs Reviewed - No data to display       Imaging Results          X-Ray Finger 2 or More Views Right (Final result)  Result time 01/18/19 10:28:27    Final result by Ramiro Dang MD (01/18/19 10:28:27)                 Impression:      No acute bony abnormality.      Electronically signed by: Ramiro Dang MD  Date:    01/18/2019  Time:    10:28             Narrative:    EXAMINATION:  XR FINGER 2  OR MORE VIEWS RIGHT    CLINICAL HISTORY:  3rd digit;    TECHNIQUE:  Three views of the middle finger    COMPARISON:  09/11/2018    FINDINGS:  No evidence of acute fracture, dislocation or bone destruction.  Alignment is satisfactory.  Soft tissue swelling along the distal aspect of the middle finger.  No abnormal radiopaque retained foreign body.                              X-Rays:   Independently Interpreted Readings:   Other Readings:  X-Ray Finger: no acute fracture or dislocation.    Medical Decision Making:   History:   Old Medical Records: I decided to obtain old medical records.  Initial Assessment:   Urgent evaluation a 43-year-old male presenting acute onset of 3rd distal finger pain for the past 1 week.  Differential Diagnosis:   Gouty tophi, arthritis, low suspicion for tenosynovitis  Clinical Tests:   Radiological Study: Ordered and Reviewed  ED Management:  - x-ray    X-ray reviewed with no evidence of fracture or bony destruction.  Exam is consistent with gouty tophi.  On examination of patient's other fingers, there is similar appearance on the left hand.  I have low suspicion for infectious processes there is no erythema, edema or warmth.  Patient does have full range of motion of the finger joints. Will discharge with supportive care.            Scribe Attestation:   Scribe #1: I performed the above scribed service and the documentation accurately describes the services I performed. I attest to the accuracy of the note.               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      Disposition:   Disposition: Discharged  Condition: Stable                        Viviana Alfaro MD  01/18/19 1038

## 2019-01-31 ENCOUNTER — HOSPITAL ENCOUNTER (EMERGENCY)
Facility: HOSPITAL | Age: 44
Discharge: HOME | End: 2019-01-31
Attending: EMERGENCY MEDICINE
Payer: MEDICAID

## 2019-01-31 VITALS
BODY MASS INDEX: 26.83 KG/M2 | DIASTOLIC BLOOD PRESSURE: 85 MMHG | WEIGHT: 177 LBS | HEART RATE: 86 BPM | RESPIRATION RATE: 16 BRPM | SYSTOLIC BLOOD PRESSURE: 154 MMHG | OXYGEN SATURATION: 99 % | TEMPERATURE: 98 F | HEIGHT: 68 IN

## 2019-01-31 DIAGNOSIS — L03.115 CELLULITIS OF RIGHT LOWER EXTREMITY: Primary | ICD-10-CM

## 2019-01-31 PROCEDURE — 99283 EMERGENCY DEPT VISIT LOW MDM: CPT

## 2019-01-31 PROCEDURE — 99284 PR EMERGENCY DEPT VISIT,LEVEL IV: ICD-10-PCS | Mod: ,,, | Performed by: PHYSICIAN ASSISTANT

## 2019-01-31 PROCEDURE — 99284 EMERGENCY DEPT VISIT MOD MDM: CPT | Mod: ,,, | Performed by: PHYSICIAN ASSISTANT

## 2019-01-31 RX ORDER — SULFAMETHOXAZOLE AND TRIMETHOPRIM 800; 160 MG/1; MG/1
1 TABLET ORAL 2 TIMES DAILY
Qty: 10 TABLET | Refills: 0 | Status: SHIPPED | OUTPATIENT
Start: 2019-01-31 | End: 2019-02-05

## 2019-01-31 NOTE — ED NOTES
LOC: The patient is awake, alert, and oriented to place, time, situation. Affect is appropriate.  Speech is appropriate and clear.     APPEARANCE: Patient resting comfortably in no acute distress.  Patient is clean and well groomed.    SKIN: Right calf with area of redness, scabbing, pt states the area burns, The skin is warm and dry; color consistent with ethnicity.  Patient has normal skin turgor and moist mucus membranes.      MUSCULOSKELETAL: Patient moving upper and lower extremities without difficulty.  Denies weakness.     RESPIRATORY: Airway is open and patent. Respirations spontaneous, even, easy, and non-labored.  Patient has a normal effort and rate.  No accessory muscle use noted. Denies cough.     CARDIAC: No peripheral edema noted. No complaints of chest pain.      ABDOMEN: Soft and non tender to palpation.  No distention noted.     NEUROLOGIC: Eyes open spontaneously.  Behavior appropriate to situation.  Follows commands; facial expression symmetrical.  Purposeful motor response noted; normal sensation in all extremities.

## 2019-01-31 NOTE — ED NOTES
Discharge home with family, states understanding to follow up as directed. Ambulates out of ED without difficulty.  Discharged home per instructions from PA.

## 2019-01-31 NOTE — ED PROVIDER NOTES
Encounter Date: 1/31/2019       History     Chief Complaint   Patient presents with    Abscess     Pt has abscess to right lower leg, thinks he was bitten by something.     43-year-old male with hypertension, gout and history of recurrent cellulitis presents for wound to the medial right lower leg which has been present for 4 days.  Patient states initially he noticed a swelling which he thought to be a pimple was exquisitely tender to the touch.  States that 2 days ago it popped with drainage of purulent material.  Denies any known trauma or injury. Reports that he felt feverish 2 days ago states that this has resolved.  Denies numbness, paresthesias, weakness or difficulty with gait.          Review of patient's allergies indicates:   Allergen Reactions    Nsaids (non-steroidal anti-inflammatory drug)      Hx of GI bleed     Past Medical History:   Diagnosis Date    Arthritis 10/11/2013    Blood transfusion     Cellulitis     GERD (gastroesophageal reflux disease)     GI bleed     Gout attack     Hypertension     Neuropathy     Osteomyelitis      Past Surgical History:   Procedure Laterality Date    CYST REMOVAL      INCISION AND DRAINAGE OF WOUND      left hand third finger    IRRIGATION AND DEBRIDEMENT RIGHT FINGER  Right 3/7/2016    Performed [facility] at [facility] OR [facility]    IRRIGATION AND DEBRIDEMENT, UPPER EXTREMITY Left 10/24/2013    Performed [facility] at [facility] 2ND FLR    SURERY ON LEFT MIDDLE FINGER      OSTEOMYLITIS     Family History   Problem Relation Age of Onset    Heart disease Mother     Diabetes Mother     Kidney disease Mother     Hypertension Mother     Stroke Mother     Cancer Mother      Social History     Tobacco Use    Smoking status: Never Smoker    Smokeless tobacco: Never Used   Substance Use Topics    Alcohol use: No    Drug use: No     Review of Systems   Constitutional: Positive for fever. Negative for appetite change, chills and  fatigue.   Respiratory: Negative for cough and shortness of breath.    Cardiovascular: Negative for chest pain and palpitations.   Gastrointestinal: Negative for abdominal pain, constipation, diarrhea, nausea and vomiting.   Genitourinary: Negative for dysuria and hematuria.   Musculoskeletal: Negative for gait problem.   Skin: Positive for color change and wound.   Allergic/Immunologic: Negative for immunocompromised state.   Neurological: Negative for weakness and numbness.   Hematological: Does not bruise/bleed easily.       Physical Exam     Initial Vitals [01/31/19 1620]   BP Pulse Resp Temp SpO2   (!) 154/85 86 16 97.9 °F (36.6 °C) 99 %      MAP       --         Physical Exam    Nursing note and vitals reviewed.  Constitutional: He appears well-developed and well-nourished. He is not diaphoretic. No distress.   HENT:   Head: Normocephalic and atraumatic.   Eyes: EOM are normal. Pupils are equal, round, and reactive to light.   Neck: Normal range of motion. Neck supple.   Cardiovascular: Normal rate, regular rhythm, normal heart sounds and intact distal pulses. Exam reveals no gallop and no friction rub.    No murmur heard.  Pulmonary/Chest: Breath sounds normal.   Musculoskeletal: Normal range of motion. He exhibits no edema or tenderness.   Neurological: He is alert and oriented to person, place, and time.   Skin: Skin is warm and dry. No rash and no abscess noted. There is erythema. No pallor.   1 cm crusted sore the medial right leg.  Small amount of surrounding erythema, with touch.  No drainage, edema or bleeding.   Psychiatric: He has a normal mood and affect.         ED Course   Procedures  Labs Reviewed - No data to display       Imaging Results    None          Medical Decision Making:   History:   Old Medical Records: I decided to obtain old medical records.       APC / Resident Notes:   43-year-old male presenting for sore on the inner right leg.  Hypertensive with BP of 154/85, vitals otherwise  normal. Small, healing wound with surrounding cellulitis.  Suspect patient had an abscess which has since drained.  Given history of recurrent skin infections and purulence will treat with Bactrim to cover for MRSA. Stressed the importance of follow-up, strict ED return precautions given.  Patient voiced understanding and is comfortable with discharge. I discussed this patient with my supervising physician.    Marily Valladares PA-C                     Clinical Impression:   The encounter diagnosis was Cellulitis of right lower extremity.      Disposition:   Disposition: Discharged  Condition: Stable                        Marily Valladares PA-C  01/31/19 3027

## 2019-01-31 NOTE — ED TRIAGE NOTES
Comes to the ED with a sore on his right calf.  Believes he was bite by something.  Site is red, scabbed, and has no drainage now.  Pt concerned it is infected.

## 2019-02-10 ENCOUNTER — HOSPITAL ENCOUNTER (EMERGENCY)
Facility: HOSPITAL | Age: 44
Discharge: HOME | End: 2019-02-10
Attending: EMERGENCY MEDICINE
Payer: MEDICAID

## 2019-02-10 VITALS
HEIGHT: 69 IN | WEIGHT: 178 LBS | TEMPERATURE: 98 F | OXYGEN SATURATION: 98 % | SYSTOLIC BLOOD PRESSURE: 160 MMHG | BODY MASS INDEX: 26.36 KG/M2 | DIASTOLIC BLOOD PRESSURE: 92 MMHG | RESPIRATION RATE: 18 BRPM | HEART RATE: 80 BPM

## 2019-02-10 DIAGNOSIS — L02.91 ABSCESS: Primary | ICD-10-CM

## 2019-02-10 DIAGNOSIS — L03.90 CELLULITIS, UNSPECIFIED CELLULITIS SITE: ICD-10-CM

## 2019-02-10 PROCEDURE — 99283 EMERGENCY DEPT VISIT LOW MDM: CPT | Mod: 25

## 2019-02-10 PROCEDURE — 10060 I&D ABSCESS SIMPLE/SINGLE: CPT

## 2019-02-10 PROCEDURE — 99283 EMERGENCY DEPT VISIT LOW MDM: CPT | Mod: 25,,, | Performed by: PHYSICIAN ASSISTANT

## 2019-02-10 PROCEDURE — 25000003 PHARM REV CODE 250: Performed by: PHYSICIAN ASSISTANT

## 2019-02-10 PROCEDURE — 10060 PR DRAIN SKIN ABSCESS SIMPLE: ICD-10-PCS | Mod: ,,, | Performed by: PHYSICIAN ASSISTANT

## 2019-02-10 PROCEDURE — 10060 I&D ABSCESS SIMPLE/SINGLE: CPT | Mod: ,,, | Performed by: PHYSICIAN ASSISTANT

## 2019-02-10 PROCEDURE — 99283 PR EMERGENCY DEPT VISIT,LEVEL III: ICD-10-PCS | Mod: 25,,, | Performed by: PHYSICIAN ASSISTANT

## 2019-02-10 RX ORDER — ACETAMINOPHEN 500 MG
1000 TABLET ORAL
Status: COMPLETED | OUTPATIENT
Start: 2019-02-10 | End: 2019-02-10

## 2019-02-10 RX ORDER — LIDOCAINE HYDROCHLORIDE 10 MG/ML
10 INJECTION INFILTRATION; PERINEURAL ONCE
Status: DISCONTINUED | OUTPATIENT
Start: 2019-02-10 | End: 2019-02-10 | Stop reason: HOSPADM

## 2019-02-10 RX ORDER — DOXYCYCLINE 100 MG/1
100 CAPSULE ORAL 2 TIMES DAILY
Qty: 10 CAPSULE | Refills: 0 | Status: SHIPPED | OUTPATIENT
Start: 2019-02-10 | End: 2019-02-15

## 2019-02-10 RX ORDER — DOXYCYCLINE HYCLATE 100 MG
100 TABLET ORAL
Status: COMPLETED | OUTPATIENT
Start: 2019-02-10 | End: 2019-02-10

## 2019-02-10 RX ADMIN — DOXYCYCLINE HYCLATE 100 MG: 100 TABLET, COATED ORAL at 05:02

## 2019-02-10 RX ADMIN — ACETAMINOPHEN 1000 MG: 500 TABLET ORAL at 05:02

## 2019-02-10 NOTE — ED PROVIDER NOTES
Encounter Date: 2/10/2019       History     Chief Complaint   Patient presents with    Cellulitis     right lower leg x 1 week     Abscess     4:56 PM  Patient is a 43-year-old male with past medical history of HTN, cellulitis, MRSA, GERD who presents the ED with redness and swelling to R lower medial leg.  Patient states that he noted discoloration to his skin 1 week ago.  He states the past few days, he had noticed increased swelling and pain to the area as if it is burning.  He has complaining of 9/10 pain to the area.  He endorses chills and sweats, but denies any fever.  He states he was treated with Bactrim approximately 2 weeks ago for redness and swelling to an area above the area he is complaining of today; this old wound has improved since.  He has no other complaints at this time.          Review of patient's allergies indicates:   Allergen Reactions    Nsaids (non-steroidal anti-inflammatory drug)      Hx of GI bleed     Past Medical History:   Diagnosis Date    Arthritis 10/11/2013    Blood transfusion     Cellulitis     GERD (gastroesophageal reflux disease)     GI bleed     Gout attack     Hypertension     Neuropathy     Osteomyelitis      Past Surgical History:   Procedure Laterality Date    CYST REMOVAL      INCISION AND DRAINAGE OF WOUND      left hand third finger    IRRIGATION AND DEBRIDEMENT RIGHT FINGER  Right 3/7/2016    Performed by [facility] at [facility]ND FLR    IRRIGATION AND DEBRIDEMENT, UPPER EXTREMITY Left 10/24/2013    Performed by Alonso Srinivasan MD at [facility] OR 2ND FLR    SURERY ON LEFT MIDDLE FINGER      OSTEOMYLITIS     Family History   Problem Relation Age of Onset    Heart disease Mother     Diabetes Mother     Kidney disease Mother     Hypertension Mother     Stroke Mother     Cancer Mother      Social History     Tobacco Use    Smoking status: Never Smoker    Smokeless tobacco: Never Used   Substance Use Topics    Alcohol use: No    Drug use:  No     Review of Systems   Constitutional: Positive for chills and diaphoresis. Negative for fever.   HENT: Negative for ear pain and sore throat.    Respiratory: Negative for shortness of breath.    Cardiovascular: Negative for chest pain.   Gastrointestinal: Negative for nausea.   Genitourinary: Negative for dysuria and hematuria.   Musculoskeletal: Negative for back pain.   Skin: Positive for color change and wound. Negative for rash.   Neurological: Negative for weakness and headaches.   Hematological: Does not bruise/bleed easily.       Physical Exam     Initial Vitals [02/10/19 1532]   BP Pulse Resp Temp SpO2   (!) 189/96 86 18 98.4 °F (36.9 °C) 100 %      MAP       --         Physical Exam    Vitals reviewed.  Constitutional: He appears well-developed and well-nourished. He is not diaphoretic.   Pleasant average age male in NAD and nontoxic appearing.   HENT:   Head: Normocephalic and atraumatic.   Nose: Nose normal.   Eyes: Conjunctivae and EOM are normal.   Neck: Normal range of motion.   Cardiovascular: Normal rate, regular rhythm and normal heart sounds. Exam reveals no friction rub.    No murmur heard.  2-3+ bilateral pitting edema.    Pulmonary/Chest: Breath sounds normal. No respiratory distress. He has no wheezes. He has no rales.   Abdominal: Soft. Bowel sounds are normal. He exhibits no distension. There is no tenderness.   Musculoskeletal: Normal range of motion.   Neurological: He is alert and oriented to person, place, and time. He has normal strength. No sensory deficit.   Skin: Skin is warm and dry. Abscess (1 x 1 cm abscess to medial R lower leg) noted. There is erythema.   Psychiatric: He has a normal mood and affect. Thought content normal.             ED Course   Unroof abscess  Location procedure was performed: [facility]  Performed by: Maren Luu PA-C  Authorized by: Brody Webber MD   Consent Done: Yes  Type: abscess  Body area: lower extremity  Anesthesia: local  infiltration    Anesthesia:  Local Anesthetic: lidocaine 1% without epinephrine  Anesthetic total: 0.5 mL  Scalpel size: 25 guage needle.  Incision type: unroof.  Complexity: simple  Drainage: pus  Drainage amount: scant  Wound treatment: wound left open and  expression of material  Complications: No  Patient tolerance: Patient tolerated the procedure well with no immediate complications        Labs Reviewed - No data to display       Imaging Results    None          Medical Decision Making:   History:   Old Medical Records: I decided to obtain old medical records.  Old Records Summarized: records from previous admission(s).       <> Summary of Records: Seen in ED on 1/31/19 for similar complaint. Rx Bactrim. Area that was treated then is above the abscess today. No signs of significant cellulitis around that wound today.   Initial Assessment:   Patient with a history of cellulitis and MRSA present to the ED with small abscess and cellulitis.   Differential Diagnosis:   Includes but is not limited to abscess and cellulitis.   ED Management:  Bedside US shows fluid filled pocked. I anesthestized and unroof area. Small amounts of purulent material was expressed from wound. He tolerated procedure well.    Given history of cellulitis and MRSA, will cover with doxycycline. First dose and acetaminophen given here. I advise proper wound care. He is to f/u with PCP for wound check or for worsening symptoms or return to the ED. All questions answered. Patient is comfortable with plan and stable for discharge.     I have reviewed patient's chart and discussed this case with my supervising MD.     Maren Luu PA-C  Emergent Department  [facility]  Spectralink #[id number] or #[phone]                        Clinical Impression:   The primary encounter diagnosis was Abscess. A diagnosis of Cellulitis, unspecified cellulitis site was also pertinent to this visit.      Disposition:   Disposition: Discharged  Condition:  Uziel Luu PA-C  02/11/19 1122

## 2019-02-10 NOTE — ED NOTES
LOC: The patient is awake, alert and aware of environment with an appropriate affect, the patient is oriented x 3 and speaking appropriately.  APPEARANCE: Patient resting comfortably and in no acute distress, patient is clean and well groomed, patient's clothing is properly fastened.  SKIN: The skin is warm and dry, color consistent with ethnicity, patient has normal skin turgor and moist mucus membranes, no breakdown or bruising noted.  Area of redness with scab at center to lateral aspect of right ankle, no drainage.  MUSCULOSKELETAL: Patient moving all extremities spontaneously, no obvious swelling or deformities noted.  RESPIRATORY: Airway is open and patent, respirations are spontaneous, patient has a normal effort and rate, no accessory muscle use noted.

## 2019-02-10 NOTE — DISCHARGE INSTRUCTIONS
Take over-the-counter acetaminophen/ Tylenol as needed for pain relief.  You were given your 1st dose of oral antibiotics in the emergency department.  Continue take doxycycline twice a day for the next 5 days for your mild abscess that was drained in the ED and cellulitis.    Call and follow up with your primary care physician or primary care physician at [facility] if her symptoms not improve or worsen return to the emergency department.    No future appointments.    Our goal in the emergency department is to always give you outstanding care and exceptional service. You may receive a survey by mail or e-mail in the next week regarding your experience in our ED. We would greatly appreciate your completing and returning the survey. Your feedback provides us with a way to recognize our staff who give very good care and it helps us learn how to improve when your experience was below our aspiration of excellence.

## 2019-08-11 ENCOUNTER — HOSPITAL ENCOUNTER (EMERGENCY)
Facility: HOSPITAL | Age: 44
Discharge: HOME | End: 2019-08-11
Attending: EMERGENCY MEDICINE

## 2019-08-11 VITALS
WEIGHT: 182 LBS | OXYGEN SATURATION: 100 % | HEART RATE: 75 BPM | RESPIRATION RATE: 18 BRPM | DIASTOLIC BLOOD PRESSURE: 78 MMHG | TEMPERATURE: 98 F | SYSTOLIC BLOOD PRESSURE: 143 MMHG | HEIGHT: 68 IN | BODY MASS INDEX: 27.58 KG/M2

## 2019-08-11 DIAGNOSIS — M79.89 RIGHT LEG SWELLING: ICD-10-CM

## 2019-08-11 DIAGNOSIS — M79.89 LEFT LEG SWELLING: Primary | ICD-10-CM

## 2019-08-11 LAB
ALBUMIN SERPL BCP-MCNC: 4.1 G/DL (ref 3.5–5.2)
ALP SERPL-CCNC: 150 U/L (ref 55–135)
ALT SERPL W/O P-5'-P-CCNC: 11 U/L (ref 10–44)
ANION GAP SERPL CALC-SCNC: 10 MMOL/L (ref 8–16)
ANISOCYTOSIS BLD QL SMEAR: SLIGHT
AST SERPL-CCNC: 20 U/L (ref 10–40)
BASOPHILS # BLD AUTO: 0.06 K/UL (ref 0–0.2)
BASOPHILS NFR BLD: 1.4 % (ref 0–1.9)
BILIRUB SERPL-MCNC: 0.3 MG/DL (ref 0.1–1)
BNP SERPL-MCNC: <10 PG/ML (ref 0–99)
BUN SERPL-MCNC: 18 MG/DL (ref 6–20)
CALCIUM SERPL-MCNC: 9.3 MG/DL (ref 8.7–10.5)
CHLORIDE SERPL-SCNC: 109 MMOL/L (ref 95–110)
CO2 SERPL-SCNC: 21 MMOL/L (ref 23–29)
CREAT SERPL-MCNC: 1.9 MG/DL (ref 0.5–1.4)
DIFFERENTIAL METHOD: ABNORMAL
EOSINOPHIL # BLD AUTO: 0.2 K/UL (ref 0–0.5)
EOSINOPHIL NFR BLD: 4.8 % (ref 0–8)
ERYTHROCYTE [DISTWIDTH] IN BLOOD BY AUTOMATED COUNT: 18.9 % (ref 11.5–14.5)
EST. GFR  (AFRICAN AMERICAN): 48.1 ML/MIN/1.73 M^2
EST. GFR  (NON AFRICAN AMERICAN): 41.6 ML/MIN/1.73 M^2
GIANT PLATELETS BLD QL SMEAR: PRESENT
GLUCOSE SERPL-MCNC: 94 MG/DL (ref 70–110)
HCT VFR BLD AUTO: 36.1 % (ref 40–54)
HGB BLD-MCNC: 10.4 G/DL (ref 14–18)
IMM GRANULOCYTES # BLD AUTO: 0.01 K/UL (ref 0–0.04)
IMM GRANULOCYTES NFR BLD AUTO: 0.2 % (ref 0–0.5)
LYMPHOCYTES # BLD AUTO: 1.2 K/UL (ref 1–4.8)
LYMPHOCYTES NFR BLD: 27.7 % (ref 18–48)
MCH RBC QN AUTO: 22.6 PG (ref 27–31)
MCHC RBC AUTO-ENTMCNC: 28.8 G/DL (ref 32–36)
MCV RBC AUTO: 79 FL (ref 82–98)
MONOCYTES # BLD AUTO: 0.4 K/UL (ref 0.3–1)
MONOCYTES NFR BLD: 8.4 % (ref 4–15)
NEUTROPHILS # BLD AUTO: 2.4 K/UL (ref 1.8–7.7)
NEUTROPHILS NFR BLD: 57.5 % (ref 38–73)
NRBC BLD-RTO: 0 /100 WBC
OVALOCYTES BLD QL SMEAR: ABNORMAL
PLATELET # BLD AUTO: 194 K/UL (ref 150–350)
PLATELET BLD QL SMEAR: ABNORMAL
PMV BLD AUTO: ABNORMAL FL (ref 9.2–12.9)
POIKILOCYTOSIS BLD QL SMEAR: SLIGHT
POTASSIUM SERPL-SCNC: 4.7 MMOL/L (ref 3.5–5.1)
PROT SERPL-MCNC: 7.4 G/DL (ref 6–8.4)
RBC # BLD AUTO: 4.6 M/UL (ref 4.6–6.2)
SODIUM SERPL-SCNC: 140 MMOL/L (ref 136–145)
WBC # BLD AUTO: 4.15 K/UL (ref 3.9–12.7)

## 2019-08-11 PROCEDURE — 83880 ASSAY OF NATRIURETIC PEPTIDE: CPT

## 2019-08-11 PROCEDURE — 85025 COMPLETE CBC W/AUTO DIFF WBC: CPT

## 2019-08-11 PROCEDURE — 99283 EMERGENCY DEPT VISIT LOW MDM: CPT | Mod: ,,, | Performed by: EMERGENCY MEDICINE

## 2019-08-11 PROCEDURE — 80053 COMPREHEN METABOLIC PANEL: CPT

## 2019-08-11 PROCEDURE — 99283 PR EMERGENCY DEPT VISIT,LEVEL III: ICD-10-PCS | Mod: ,,, | Performed by: EMERGENCY MEDICINE

## 2019-08-11 PROCEDURE — 99284 EMERGENCY DEPT VISIT MOD MDM: CPT | Mod: 25

## 2019-08-11 RX ORDER — FERROUS SULFATE 325(65) MG
325 TABLET ORAL
COMMUNITY
Start: 2018-07-05

## 2019-08-11 RX ORDER — ALLOPURINOL 300 MG/1
300 TABLET ORAL
COMMUNITY
Start: 2013-11-19

## 2019-08-11 NOTE — ED TRIAGE NOTES
"Pt presents to the ED c/o of left foot pain. Pt states left foot pain started couple days ago. Has pmhx of gout and fractures to left foot. Pt has noted +3 pitting edema to lower extremities ankles ankles and foot bi-lat. Erythema and tightness to lower extremities noted. Circumferential asymmetry noted to LLE more so then RLE. Pulses weak +1. Endorses claudication. Pt states "my legs have been swollen like this before I use to be on lasix but not any more". Denies pmhx of CHF, c/p, SOB, fever, chills. Pt is AAOX4. Name and  checked and verified.  "

## 2019-08-11 NOTE — DISCHARGE INSTRUCTIONS
You were seen in the emergency department for leg swelling.  Your ultrasound showed no blood clot at this time, however it is important to follow up in 3 days for repeat ultrasound if you continue to have pain and swelling in your leg.  Return to the emergency department for worsening pain, swelling, redness or fever/chills, or other concerning symptoms.

## 2019-08-11 NOTE — ED PROVIDER NOTES
Encounter Date: 8/11/2019       History     Chief Complaint   Patient presents with    Leg Pain     Pt reports left foot and left leg pain x 3 days, worse today. Pt also reports left leg swelling     44-year-old male with past medical history of recent left foot fracture, presenting with several days of increasing bilateral lower extremity edema, left greater than right.  Denies any chest pain, shortness of breath, fever or chills, numbness or weakness in feet.        Review of patient's allergies indicates:   Allergen Reactions    Nsaids (non-steroidal anti-inflammatory drug)      Hx of GI bleed     Past Medical History:   Diagnosis Date    Arthritis 10/11/2013    Blood transfusion     Cellulitis     GERD (gastroesophageal reflux disease)     GI bleed     Gout attack     Hypertension     Neuropathy     Osteomyelitis      Past Surgical History:   Procedure Laterality Date    CYST REMOVAL      INCISION AND DRAINAGE OF WOUND      left hand third finger    IRRIGATION AND DEBRIDEMENT RIGHT FINGER  Right 3/7/2016    Performed by [facility] at [facility] OR [facility]    IRRIGATION AND DEBRIDEMENT, UPPER EXTREMITY Left 10/24/2013    Performed by Al[facility]an [facility] at [facility] OR 2ND FLR    SURERY ON LEFT MIDDLE FINGER      OSTEOMYLITIS     Family History   Problem Relation Age of Onset    Heart disease Mother     Diabetes Mother     Kidney disease Mother     Hypertension Mother     Stroke Mother     Cancer Mother      Social History     Tobacco Use    Smoking status: Never Smoker    Smokeless tobacco: Never Used   Substance Use Topics    Alcohol use: No    Drug use: No     Review of Systems   Constitutional: Negative for chills, fatigue and fever.   Eyes: Negative for visual disturbance.        Neg vision changes   Respiratory: Negative for cough, chest tightness and shortness of breath.    Cardiovascular: Positive for leg swelling. Negative for chest pain and palpitations.   Gastrointestinal:  Negative for abdominal pain, nausea and vomiting.        Neg changes in stool   Genitourinary: Negative for decreased urine volume, dysuria, flank pain, frequency and urgency.        Neg changes in urination   Musculoskeletal: Negative for arthralgias, back pain and joint swelling.   Skin: Negative for rash and wound.   Allergic/Immunologic: Negative for immunocompromised state.   Neurological: Negative for dizziness, light-headedness and headaches.   Hematological: Does not bruise/bleed easily.       Physical Exam     Initial Vitals [08/11/19 0442]   BP Pulse Resp Temp SpO2   (!) 176/98 82 18 98.1 °F (36.7 °C) 99 %      MAP       --         Physical Exam    Constitutional: He appears well-developed and well-nourished. He is not diaphoretic. No distress.   disheveled   HENT:   Head: Normocephalic and atraumatic.   Nose: Nose normal.   Eyes: Conjunctivae and EOM are normal. Pupils are equal, round, and reactive to light. No scleral icterus.   Neck: Normal range of motion. Neck supple.   Cardiovascular: Normal rate, regular rhythm and normal heart sounds.   No murmur heard.  Pulmonary/Chest: Breath sounds normal. No respiratory distress. He has no wheezes. He has no rhonchi. He has no rales. He exhibits no tenderness.   Abdominal: Soft. Bowel sounds are normal. He exhibits no distension. There is no tenderness. There is no rebound.   Musculoskeletal: Normal range of motion. He exhibits edema (bilateral: 2+ R, 3+ L). He exhibits no tenderness.   Neurological: He is alert and oriented to person, place, and time. He has normal strength. No cranial nerve deficit or sensory deficit.   Skin: Skin is warm and dry. No rash noted. There is erythema (venous stasis changes bilaterally).   No warmth, no cellulitis   Psychiatric: He has a normal mood and affect. His behavior is normal. Judgment and thought content normal.         ED Course   Procedures  Labs Reviewed - No data to display       Imaging Results    None           Medical Decision Making:   History:   Old Medical Records: I decided to obtain old medical records.  Initial Assessment:   Patient is nontoxic-appearing, however he has significant bilateral pitting edema, left greater than right, with venous stasis changes and mild anterior erythema, but no warmth and no tenderness to palpation. Given risk factors for DVT will obtain bilateral ultrasound, as well as labs for heart failure.  Differential Diagnosis:   Venous insufficency, CHF, cellulitis, DVT  Clinical Tests:   Lab Tests: Ordered and Reviewed  Radiological Study: Ordered and Reviewed  ED Management:  Labs negative for acute heart failure, US neg for DVT bilaterally. Per review of records pt has chronically edematous legs, but no evidence of systemic organ failure at this time. Advised pt to f/u with his PCP but also return for a repeat DVT study of he continues to have edema one leg greater than the other. Put stable for discharge and output f/u, understands return precautions.                      Clinical Impression:       ICD-10-CM ICD-9-CM   1. Left leg swelling M79.89 729.81   2. Right leg swelling M79.89 729.81                                Yokasta Lockett MD  08/16/19 1229

## 2019-08-15 ENCOUNTER — HOSPITAL ENCOUNTER (EMERGENCY)
Facility: HOSPITAL | Age: 44
Discharge: HOME | End: 2019-08-15
Attending: EMERGENCY MEDICINE

## 2019-08-15 VITALS
HEART RATE: 87 BPM | TEMPERATURE: 98 F | OXYGEN SATURATION: 98 % | DIASTOLIC BLOOD PRESSURE: 84 MMHG | BODY MASS INDEX: 26.07 KG/M2 | SYSTOLIC BLOOD PRESSURE: 136 MMHG | HEIGHT: 68 IN | WEIGHT: 172 LBS | RESPIRATION RATE: 18 BRPM

## 2019-08-15 DIAGNOSIS — M79.89 LEG SWELLING: ICD-10-CM

## 2019-08-15 DIAGNOSIS — I87.2 VENOUS INSUFFICIENCY OF BOTH LOWER EXTREMITIES: Primary | ICD-10-CM

## 2019-08-15 DIAGNOSIS — L03.116 CELLULITIS OF LEFT LOWER EXTREMITY: ICD-10-CM

## 2019-08-15 PROCEDURE — 99283 EMERGENCY DEPT VISIT LOW MDM: CPT

## 2019-08-15 PROCEDURE — 99284 PR EMERGENCY DEPT VISIT,LEVEL IV: ICD-10-PCS | Mod: ,,, | Performed by: EMERGENCY MEDICINE

## 2019-08-15 PROCEDURE — 25000003 PHARM REV CODE 250: Performed by: EMERGENCY MEDICINE

## 2019-08-15 PROCEDURE — 99284 EMERGENCY DEPT VISIT MOD MDM: CPT | Mod: ,,, | Performed by: EMERGENCY MEDICINE

## 2019-08-15 RX ORDER — SULFAMETHOXAZOLE AND TRIMETHOPRIM 800; 160 MG/1; MG/1
1 TABLET ORAL 2 TIMES DAILY
Qty: 14 TABLET | Refills: 0 | Status: SHIPPED | OUTPATIENT
Start: 2019-08-15 | End: 2019-08-22

## 2019-08-15 RX ORDER — TRAMADOL HYDROCHLORIDE 50 MG/1
50 TABLET ORAL
Status: COMPLETED | OUTPATIENT
Start: 2019-08-15 | End: 2019-08-15

## 2019-08-15 RX ADMIN — TRAMADOL HYDROCHLORIDE 50 MG: 50 TABLET, FILM COATED ORAL at 05:08

## 2019-08-15 NOTE — ED NOTES
Patient identifiers verified and correct for Mr Mitchell  C/C: Redness and swelling to LLE SEE NN  APPEARANCE: awake and alert in NAD.  SKIN: warm, dry Redness and swelling noted to BLE  MUSCULOSKELETAL: Patient moving all extremities spontaneously, no obvious swelling or deformities noted. Ambulates independently.  RESPIRATORY: Denies shortness of breath.Respirations unlabored.   CARDIAC: Denies CP, 2+ distal pulses; no peripheral edema  ABDOMEN: S/ND/NT, Denies nausea  : voids spontaneously, denies difficulty  Neurologic: AAO x 4; follows commands equal strength in all extremities; denies numbness/tingling. Denies dizziness Denies weakness

## 2019-08-15 NOTE — ED TRIAGE NOTES
Patient states pain and redness to LLE, seen in ED Sunday for same. Temp to 101 yesterday. Redness and swelling noted to BLE.

## 2019-08-15 NOTE — ED PROVIDER NOTES
Encounter Date: 8/15/2019    SCRIBE #1 NOTE: I, Ana Rosa Rodriguez, am scribing for, and in the presence of,  Dr. Mcqueen. I have scribed the following portions of the note - Other sections scribed: ANDERS SOARES.       History     Chief Complaint   Patient presents with    Leg Pain     C/o left calf, ankle and foot pain with redness.      Time patient was seen by the provider: 5:28 PM      The patient is a 44 y.o. male with history of gout attack, HTN, cellulitis, neuropathy, and osteomyelitis who presents to the ED with a complaint of leg pain. Patient reports worsening pain and redness to LLE extremity and BLE swelling intermittently for several weeks. He states the pain is worse with weight bearing. He notes he had a fever  this morning, but denies chills. He has been keeping his legs elevated. He was seen in the ED 4 days ago for similar and had an ultrasound 2 days ago with no significant findings or concerns for DVT. He is not currently on any antibiotics. Patient has no addiitonal complaints at this time.  He denies any traumas, falls, any other injury.    The history is provided by the patient and medical records.     Review of patient's allergies indicates:   Allergen Reactions    Nsaids (non-steroidal anti-inflammatory drug)      Hx of GI bleed     Past Medical History:   Diagnosis Date    Arthritis 10/11/2013    Blood transfusion     Cellulitis     GERD (gastroesophageal reflux disease)     GI bleed     Gout attack     Hypertension     Neuropathy     Osteomyelitis      Past Surgical History:   Procedure Laterality Date    CYST REMOVAL      INCISION AND DRAINAGE OF WOUND      left hand third finger    IRRIGATION AND DEBRIDEMENT RIGHT FINGER  Right 3/7/2016    Performed by [facility] at [facility]    IRRIGATION AND DEBRIDEMENT, UPPER EXTREMITY Left 10/24/2013    Performed by [facility] at [facility] OR 2ND FLR    SURERY ON LEFT MIDDLE FINGER      OSTEOMYLITIS     Family History    Problem Relation Age of Onset    Heart disease Mother     Diabetes Mother     Kidney disease Mother     Hypertension Mother     Stroke Mother     Cancer Mother      Social History     Tobacco Use    Smoking status: Never Smoker    Smokeless tobacco: Never Used   Substance Use Topics    Alcohol use: No    Drug use: No     Review of Systems   Constitutional: Negative for chills.   HENT: Negative for sore throat.    Respiratory: Negative for shortness of breath.    Cardiovascular: Positive for leg swelling (b/l). Negative for chest pain.   Gastrointestinal: Negative for nausea.   Genitourinary: Negative for dysuria.   Musculoskeletal: Negative for back pain.        Positive for LLE pain and redness.    Skin: Negative for rash.   Neurological: Negative for weakness.   Hematological: Does not bruise/bleed easily.       Physical Exam     Initial Vitals [08/15/19 1615]   BP Pulse Resp Temp SpO2   136/84 87 18 98.4 °F (36.9 °C) 98 %      MAP       --         Physical Exam    Nursing note and vitals reviewed.    Gen/Constitutional: Interactive.  Unkempt, disheveled appearance.  No acute distress  Head: Normocephalic, Atraumatic  Neck: supple, no masses or LAD  Eyes: PERRLA, conjunctiva clear  Ears, Nose and Throat: No rhinorrhea or stridor.  Cardiac: Reg Rhythm, No murmur  Pulmonary: CTA Bilat, no wheezes, rhonchi, rales.  GI: Abdomen soft, non-tender, non-distended; no rebound or guarding  : No CVA tenderness.  Musculoskeletal: Extremities warm, well perfused, no erythema, bilateral lower extremity edema, left greater than right.  Erythema surrounding the left lower extremity, positive venous insufficiency, positive venous stasis, positive varicose veins, 2+ distal pulses, sensory intact to light touch.  Skin: No rashes, erythema and left lower extremity.  Neuro: Alert and Oriented x 3; No focal motor or sensory deficits.    Psych: Normal affect     ED Course   Procedures  Labs Reviewed - No data to display        Imaging Results    None          Medical Decision Making:   History:   Old Medical Records: I decided to obtain old medical records.  Old Records Summarized: records from clinic visits.  Initial Assessment:   44 y.o. male with history of gout attack, HTN, cellulitis, neuropathy, and osteomyelitis presenting with leg pain.   Differential Diagnosis:   Differential diagnosis includes but is not limited to:  Cellulitis, abscess, necrotizing fasciitis, deep space infection, venous insufficiency, venous stasis ulcers.  Clinical Tests:   Lab Tests: Reviewed  Radiological Study: Reviewed    Urgent evaluation of patient presenting with bilateral lower extremity swelling, left greater than right.  Recently seen in the ED for similar findings, found to have venous insufficiency with a DVT workup which was negative on ultrasound 2 days ago.  He is afebrile, vital signs are stable. Physical exam findings remarkable for evidence of bilateral venous insufficiency with varicose veins.  He appears to have a slight amount of erythema on the left lower extremity with multiple areas of scratch marks where he has been scratching himself.  No appearance of abscess, soft compartments, 2+ distal pulses, sensory intact to light touch.  Given the amount of his erythema and history of cellulitis, this may appear to be early cellulitis of the left lower extremity.  Patient is instructed to use compression stockings and keep his legs elevated when lying down.  Instructed to follow up with PCP in the next 2-3 days for repeat evaluation.  Will treat with Bactrim for left lower extremity cellulitis, compression stockings for venous insufficiency with leg elevation.  No other red flags at this time suggest any other acute emergent pathology.  No constitutional symptoms, doubt sepsis, necrotizing fasciitis or deep space infection.  Patient is requesting pain medication, 1 dose of tramadol was given in the ED.    Complexity:  Moderate           Scribe Attestation:   Scribe #1: I performed the above scribed service and the documentation accurately describes the services I performed. I attest to the accuracy of the note.    I, Dr. Dat Mcqueen, personally performed the services described in this documentation. All medical record entries made by the scribe were at my direction and in my presence.  I have reviewed the chart and agree that the record reflects my personal performance and is accurate and complete.              Clinical Impression:       ICD-10-CM ICD-9-CM   1. Venous insufficiency of both lower extremities I87.2 459.81   2. Leg swelling M79.89 729.81   3. Cellulitis of left lower extremity L03.116 682.6         Disposition:   Disposition: Discharged  Condition: Stable       Dat Mcqueen DO  Dept of Emergency Medicine   [facility]  Spectralink: [phone] chio Mcqueen DO  08/15/19 1833

## 2019-08-15 NOTE — DISCHARGE INSTRUCTIONS
You have chronic venous insufficiency with swelling of both her legs.  Wear compression stockings as previously instructed, take antibiotics as prescribed, follow up with your primary care for repeat wound check in 1 week and re-evaluation.

## 2019-08-15 NOTE — ED NOTES
Discharge home, states understanding to follow up as directed. Ambulates out of ED without difficulty. RX given, Med prior to discahrge.

## 2019-08-16 NOTE — ED PROVIDER NOTES
Encounter Date: 8/11/2019       History     Chief Complaint   Patient presents with    Leg Pain     Pt reports left foot and left leg pain x 3 days, worse today. Pt also reports left leg swelling     44-year-old male with past medical history of recent left foot fracture, presenting with several days of increasing bilateral lower extremity edema, left greater than right.  Denies any chest pain, shortness of breath, fever or chills, numbness or weakness in feet.        Review of patient's allergies indicates:   Allergen Reactions    Nsaids (non-steroidal anti-inflammatory drug)      Hx of GI bleed     Past Medical History:   Diagnosis Date    Arthritis 10/11/2013    Blood transfusion     Cellulitis     GERD (gastroesophageal reflux disease)     GI bleed     Gout attack     Hypertension     Neuropathy     Osteomyelitis      Past Surgical History:   Procedure Laterality Date    CYST REMOVAL      INCISION AND DRAINAGE OF WOUND      left hand third finger    IRRIGATION AND DEBRIDEMENT RIGHT FINGER  Right 3/7/2016    Performed by Bora [facility] at [facility] OR 2ND FLR    IRRIGATION AND DEBRIDEMENT, UPPER EXTREMITY Left 10/24/2013    Performed by Alonso Srinivasan [facility] at [facility] OR 2ND FLR    SURERY ON LEFT MIDDLE FINGER      OSTEOMYLITIS     Family History   Problem Relation Age of Onset    Heart disease Mother     Diabetes Mother     Kidney disease Mother     Hypertension Mother     Stroke Mother     Cancer Mother      Social History     Tobacco Use    Smoking status: Never Smoker    Smokeless tobacco: Never Used   Substance Use Topics    Alcohol use: No    Drug use: No     Review of Systems   Constitutional: Negative for chills, fatigue and fever.   Eyes: Negative for visual disturbance.        Neg vision changes   Respiratory: Negative for cough, chest tightness and shortness of breath.    Cardiovascular: Positive for leg swelling. Negative for chest pain and palpitations.   Gastrointestinal:  Negative for abdominal pain, nausea and vomiting.        Neg changes in stool   Genitourinary: Negative for decreased urine volume, dysuria, flank pain, frequency and urgency.        Neg changes in urination   Musculoskeletal: Negative for arthralgias, back pain and joint swelling.   Skin: Negative for rash and wound.   Allergic/Immunologic: Negative for immunocompromised state.   Neurological: Negative for dizziness, light-headedness and headaches.   Hematological: Does not bruise/bleed easily.       Physical Exam     Initial Vitals [08/11/19 0442]   BP Pulse Resp Temp SpO2   (!) 176/98 82 18 98.1 °F (36.7 °C) 99 %      MAP       --         Physical Exam    Constitutional: He appears well-developed and well-nourished. He is not diaphoretic. No distress.   disheveled   HENT:   Head: Normocephalic and atraumatic.   Nose: Nose normal.   Eyes: Conjunctivae and EOM are normal. Pupils are equal, round, and reactive to light.   Neck: Normal range of motion. Neck supple.   Cardiovascular: Normal rate, regular rhythm and normal heart sounds.   No murmur heard.  Pulmonary/Chest: Breath sounds normal. No respiratory distress. He has no wheezes. He has no rhonchi. He has no rales. He exhibits no tenderness.   Abdominal: Soft. Bowel sounds are normal. He exhibits no distension. There is no tenderness. There is no rebound.   Musculoskeletal: Normal range of motion. He exhibits edema (bilateral: 2+ R, 3+ L).   Neurological: He is alert and oriented to person, place, and time. He has normal strength.   Skin: Skin is warm and dry. No rash noted. There is erythema (venous stasis changes bilaterally).   No warmth   Psychiatric: He has a normal mood and affect. His behavior is normal. Judgment and thought content normal.         ED Course   Procedures  Labs Reviewed - No data to display       Imaging Results    None          Medical Decision Making:   Initial Assessment:   Patient is nontoxic-appearing, however he has significant  bilateral pitting edema, left greater than right, with venous stasis changes and mild anterior erythema, but no warmth and no tenderness to palpation. Given risk factors for DVT will obtain bilateral ultrasound, as well as labs for heart failure.                      Clinical Impression:   {Add your Clinical Impression here. If you haven't documented one yet, please pend the note, finalize a Clinical Impression, and refresh your note before signing.:[id number]}

## 2019-08-18 ENCOUNTER — HOSPITAL ENCOUNTER (EMERGENCY)
Facility: OTHER | Age: 44
Discharge: HOME | End: 2019-08-18
Attending: EMERGENCY MEDICINE

## 2019-08-18 VITALS
RESPIRATION RATE: 15 BRPM | HEART RATE: 84 BPM | SYSTOLIC BLOOD PRESSURE: 148 MMHG | OXYGEN SATURATION: 98 % | TEMPERATURE: 99 F | DIASTOLIC BLOOD PRESSURE: 66 MMHG | WEIGHT: 178.13 LBS | HEIGHT: 67 IN | BODY MASS INDEX: 27.96 KG/M2

## 2019-08-18 DIAGNOSIS — M10.9 ACUTE GOUT OF RIGHT HAND, UNSPECIFIED CAUSE: Primary | ICD-10-CM

## 2019-08-18 PROCEDURE — 99283 EMERGENCY DEPT VISIT LOW MDM: CPT | Mod: 25

## 2019-08-18 RX ORDER — METHYLPREDNISOLONE 4 MG/1
TABLET ORAL
Qty: 1 PACKAGE | Refills: 0 | Status: SHIPPED | OUTPATIENT
Start: 2019-08-18 | End: 2019-08-27 | Stop reason: SDUPTHER

## 2019-08-18 NOTE — ED PROVIDER NOTES
Encounter Date: 8/18/2019    SCRIBE #1 NOTE: Ирина EDWARDS, am scribing for, and in the presence of, Dr. Kern .       History     Chief Complaint   Patient presents with    Finger Injury     pain and swelling x 10 hrs     Time seen by provider: 3:40 AM    This is a 44 y.o. male with a hx of Gout who presents with complaint of right middle finger pain since 10 hours ago.  Patient does not recall a mechanism of injury. He reports having gout on the tip of that same finger in the past and this feels similar. The patient is unable to take NSAID's due to a previous GI Bleed.  Patient describes the pain as 10/10, aching and nonradiating.  Pain is worse when he moves but constant throughout.  No loss of sensation, no loss of function.      The history is provided by the patient.     Review of patient's allergies indicates:   Allergen Reactions    Nsaids (non-steroidal anti-inflammatory drug)      Hx of GI bleed     Past Medical History:   Diagnosis Date    Arthritis 10/11/2013    Blood transfusion     Cellulitis     GERD (gastroesophageal reflux disease)     GI bleed     Gout attack     Hypertension     Neuropathy     Osteomyelitis      Past Surgical History:   Procedure Laterality Date    CYST REMOVAL      INCISION AND DRAINAGE OF WOUND      left hand third finger    IRRIGATION AND DEBRIDEMENT RIGHT FINGER  Right 3/7/2016    Performed by Bora Cruz MD at [facility] OR 2ND FLR    IRRIGATION AND DEBRIDEMENT, UPPER EXTREMITY Left 10/24/2013    Performed by [facility]onso [facility] at [facility] OR 2ND FLR    SURERY ON LEFT MIDDLE FINGER      OSTEOMYLITIS     Family History   Problem Relation Age of Onset    Heart disease Mother     Diabetes Mother     Kidney disease Mother     Hypertension Mother     Stroke Mother     Cancer Mother      Social History     Tobacco Use    Smoking status: Never Smoker    Smokeless tobacco: Never Used   Substance Use Topics    Alcohol use: No    Drug use: No     Review of  Systems   Constitutional: Negative for chills and fever.   Eyes: Negative for visual disturbance.   Respiratory: Negative for cough and shortness of breath.    Cardiovascular: Negative for chest pain.   Gastrointestinal: Negative for abdominal pain, diarrhea and nausea.   Musculoskeletal: Negative for gait problem and myalgias.        Positive for right middle finger pain.    Skin: Negative for rash.   Hematological: Does not bruise/bleed easily.   All other systems reviewed and are negative.      Physical Exam     Initial Vitals [08/18/19 0301]   BP Pulse Resp Temp SpO2   (!) 156/81 90 20 98.8 °F (37.1 °C) 99 %      MAP       --         Physical Exam    Nursing note and vitals reviewed.  Constitutional: He appears well-developed and well-nourished. He is not diaphoretic. No distress.   HENT:   Head: Normocephalic and atraumatic.   Right Ear: External ear normal.   Left Ear: External ear normal.   Nose: Nose normal.   Eyes: EOM are normal. Pupils are equal, round, and reactive to light. Right eye exhibits no discharge. Left eye exhibits no discharge.   Neck: Normal range of motion. Neck supple. No JVD present.   Cardiovascular: Normal rate, regular rhythm, normal heart sounds and intact distal pulses. Exam reveals no gallop and no friction rub.    No murmur heard.  Pulmonary/Chest: Breath sounds normal. No respiratory distress. He has no wheezes. He has no rhonchi. He has no rales. He exhibits no tenderness.   Musculoskeletal: Normal range of motion.   Right middle finger:  Fusiform swelling, slight tenderness to palpation at PIP and DIP, no tenderness to palpation along flexor sheath, no pain to passive extension, no erythema, no induration, finger not held in flexion, neurovascularly intact distally   Neurological: He is alert and oriented to person, place, and time. He has normal strength. No sensory deficit.   Skin: Skin is warm and dry. Capillary refill takes less than 2 seconds. No rash noted. No erythema.    Psychiatric: He has a normal mood and affect. Thought content normal.         ED Course   Procedures  Labs Reviewed - No data to display       Imaging Results          X-Ray Hand 3 view Right (Final result)  Result time 08/18/19 04:19:43    Final result by Canelo Burgess MD (08/18/19 04:19:43)                 Impression:      Soft tissue swelling of the 3rd digit without radiographically detectable radiopaque foreign body or abnormal air density within the soft tissues.    There is no evidence for osseous destructive process or acute fracture deformity, chronic changes are noted, close clinical and historical correlation otherwise needed to determine need for additional follow-up.      Electronically signed by: Canelo Burgess  Date:    08/18/2019  Time:    04:19             Narrative:    EXAMINATION:  XR HAND COMPLETE 3 VIEW RIGHT    CLINICAL HISTORY:  pain;    TECHNIQUE:  PA, lateral, and oblique views of the right hand were performed.    COMPARISON:  Radiograph of the 3rd digit January 18, 2019    FINDINGS:  Radiographic examination of the right hand was performed, 3 radiographs are submitted.  The osseous structures demonstrate chronic change.  There is appearance of soft tissue swelling about the 3rd digit, middle finger, of uncertain etiology.  There is no radiographically detectable radiopaque foreign body, and no evidence for abnormal air density within the soft tissues.  The visualized osseous structures appear intact.                                 Medical Decision Making:   History:   Old Medical Records: I decided to obtain old medical records.  Old Records Summarized: records from clinic visits.       <> Summary of Records: Two other episodes since January of gout in the same finger.   Differential Diagnosis:   Flexor tenosynovitis, atypical cellulitis, chronic felon, no evidence of paronychia, progressive osteoarthritis, gout  Independently Interpreted Test(s):   I have ordered and independently  interpreted X-rays - see prior notes.  Clinical Tests:   Radiological Study: Ordered and Reviewed  ED Management:  Patient has known history of multiple gouty arthritis attacks to this 3rd middle finger of the right hand.  Patient does not have evidence of erythema, or cellulitis, and I do not believe that he has flexor tenosynovitis. He has been seen many times for this previously and has notably had very similar exams.  Discussed with patient that would treat this as a gout arthritis attack.  Reviewed patient's [place] prescription monitoring program.  Patient unable to take NSAIDs due to GI bleeding in the past.  Offered steroids as an alternative, patient agreed.            Scribe Attestation:   Scribe #1: I performed the above scribed service and the documentation accurately describes the services I performed. I attest to the accuracy of the note.    Attending Attestation:           Physician Attestation for Scribe:  Physician Attestation Statement for Scribe #1: I, Dr. Kern, reviewed documentation, as scribed by Ирина Dickens  in my presence, and it is both accurate and complete.                    Clinical Impression:     1. Acute gout of right hand, unspecified cause                                   Preston Kern MD  08/19/19 0015

## 2019-08-18 NOTE — ED NOTES
Pt c/o pain to the middle finger, R hand x 10 hrs. Pt states it has been swelling x 2 days and he doesn't know if its a gout flare- up or an infection. Pt denies trauma. Pt is A & O x 3, denies SOB, fever, chills and N/V/D. Skin is warm, dry and pink. VS.

## 2019-08-26 ENCOUNTER — HOSPITAL ENCOUNTER (EMERGENCY)
Facility: HOSPITAL | Age: 44
Discharge: HOME | End: 2019-08-27

## 2019-08-26 VITALS
HEART RATE: 88 BPM | RESPIRATION RATE: 16 BRPM | BODY MASS INDEX: 27.78 KG/M2 | HEIGHT: 67 IN | SYSTOLIC BLOOD PRESSURE: 133 MMHG | WEIGHT: 177 LBS | OXYGEN SATURATION: 100 % | TEMPERATURE: 99 F | DIASTOLIC BLOOD PRESSURE: 81 MMHG

## 2019-08-26 DIAGNOSIS — M10.9 GOUTY ARTHRITIS: Primary | ICD-10-CM

## 2019-08-26 PROCEDURE — 99284 EMERGENCY DEPT VISIT MOD MDM: CPT

## 2019-08-26 PROCEDURE — 99283 EMERGENCY DEPT VISIT LOW MDM: CPT | Mod: ,,, | Performed by: EMERGENCY MEDICINE

## 2019-08-26 PROCEDURE — 99283 PR EMERGENCY DEPT VISIT,LEVEL III: ICD-10-PCS | Mod: ,,, | Performed by: EMERGENCY MEDICINE

## 2019-08-27 RX ORDER — METHYLPREDNISOLONE 4 MG/1
TABLET ORAL
Qty: 1 PACKAGE | Refills: 0 | Status: SHIPPED | OUTPATIENT
Start: 2019-08-27 | End: 2019-09-17

## 2019-08-27 RX ORDER — PREDNISONE 50 MG/1
50 TABLET ORAL DAILY
Qty: 5 TABLET | Refills: 0 | Status: SHIPPED | OUTPATIENT
Start: 2019-08-27 | End: 2019-09-01

## 2019-08-27 NOTE — ED PROVIDER NOTES
"Encounter Date: 8/26/2019       History     Chief Complaint   Patient presents with    Finger Swelling     Pt reports swelling to third digit of right hand since last Sunday. Denies injury to finger. Pt states "I think it is gout"     HPI     44 year old male with past medical history significant for gout, GERD, past GIB, HTN, CKD who presents to the emergency department with right finger pain. Per patient, this has been going on for the last week, states that he went to an outside ER, was given prednisone at that time, which did not change his swelling to his finger. States that he has had exploration of the finger in the past, told it was gout at that time. States that he otherwise has not any fever. Denies any history of trauma to the finger.     Review of patient's allergies indicates:   Allergen Reactions    Nsaids (non-steroidal anti-inflammatory drug)      Hx of GI bleed     Past Medical History:   Diagnosis Date    Arthritis 10/11/2013    Blood transfusion     Cellulitis     GERD (gastroesophageal reflux disease)     GI bleed     Gout attack     Hypertension     Neuropathy     Osteomyelitis     Renal disorder     CKD stage 3     Past Surgical History:   Procedure Laterality Date    CYST REMOVAL      INCISION AND DRAINAGE OF WOUND      left hand third finger    IRRIGATION AND DEBRIDEMENT RIGHT FINGER  Right 3/7/2016    [facility]formed [facility] at [facility] 2ND FLR    IRRIGATION AND DEBRIDEMENT, UPPER EXTREMITY Left 10/24/2013    Performed [facility] at [facility] OR 2ND FLR    SURERY ON LEFT MIDDLE FINGER      OSTEOMYLITIS     Family History   Problem Relation Age of Onset    Heart disease Mother     Diabetes Mother     Kidney disease Mother     Hypertension Mother     Stroke Mother     Cancer Mother      Social History     Tobacco Use    Smoking status: Never Smoker    Smokeless tobacco: Never Used   Substance Use Topics    Alcohol use: No    Drug use: No     Review of " Systems   Constitutional: Negative for chills and fever.   Respiratory: Negative for shortness of breath and wheezing.    Gastrointestinal: Negative for abdominal pain and vomiting.       Physical Exam     Initial Vitals [08/26/19 2343]   BP Pulse Resp Temp SpO2   133/81 88 16 98.6 °F (37 °C) 100 %      MAP       --         Physical Exam    Nursing note and vitals reviewed.  Constitutional: He appears well-developed and well-nourished. No distress.   Non toxic appearing, sitting comfortably inbed   HENT:   Head: Normocephalic and atraumatic.   Mouth/Throat: No oropharyngeal exudate.   Eyes: EOM are normal. Pupils are equal, round, and reactive to light. No scleral icterus.   Cardiovascular: Normal rate and regular rhythm. Exam reveals no gallop and no friction rub.    No murmur heard.  Pulmonary/Chest: Breath sounds normal. No respiratory distress. He has no wheezes. He has no rhonchi. He has no rales.   Musculoskeletal: Normal range of motion.   Fusiform swelling to the right 3rd digit, no TTP over the flexor tendon, no pain with passive extension, no surrounding erythema, no crepitus, no deformities, tophi noted on the bilateral upper extremities, particularly over the digits and carpals   Neurological: He is alert and oriented to person, place, and time.   Skin: Skin is warm and dry.   Psychiatric: He has a normal mood and affect. Thought content normal.         ED Course   Procedures  Labs Reviewed - No data to display        Assessment: 44 year old male with right 3rd digit finger pain    Ddx includes but is not limited to: gout, flexor tenosynovitis, cellulitis, fracture, dislocation, subluxation    Plan: Emergent evaluation of a 44 year old male for right 3rd digit finger pain. Patient is HDS, afebrile. On exam, has fusiform swelling, however has negative Kanavels signs; do not suspect flexor tenosynovitis based on his history and physical. Feel the patient's duration of steroids may not have been sufficient  to treat his likely gouty arthritis. Since he has known CKD, cannot treat with NSAIDs; do not feel increasing his colchicine would be beneficial either. Providing ongoing prednisone therapy burst as well as medrol pack after he finishes. He will follow up with his primary care physician. Voiced understanding for plan and return precautions. Stable for discharge.      Edwin Kessler, HO-3  8/27/2019 12:31 AM      Imaging Results    None                       Attending Attestation:   Physician Attestation Statement for Resident:  As the supervising MD   Physician Attestation Statement: I have personally seen and examined this patient.    As the supervising MD I agree with the above PE.    As the supervising MD I agree with the above treatment, course, plan, and disposition.   -: Recent treatment for gout was medrol pack. Has hx that does not allow for nsaids. Will offer high dose prednisone pulse.                        Clinical Impression:       ICD-10-CM ICD-9-CM   1. Gouty arthritis M10.9 274.00                                Edwin Kessler MD  Resident  08/27/19 0043       Morgan Garduno MD  08/27/19 0222

## 2019-08-27 NOTE — DISCHARGE INSTRUCTIONS
If you start to have a fever, are unable to move the finger at all, have any swelling past the finger, or you have any other concerns, you should return to the ED. Otherwise, follow up with your primary care.

## 2019-08-27 NOTE — ED TRIAGE NOTES
Pt presents to ED with finger swelling and pain since 8/18/19. He reports no injury to cause it. Presented to [facility] ED and was given steroids; swelling has only worsened since then and pain is radiating into hand. Has had gout problems in his feet as well as cellulitis in the past.     Patient identifiers verified and correct for Stu Mitchell.     LOC: The patient is awake, alert and aware of environment with an appropriate affect, the patient is oriented x 3 and speaking appropriately.   APPEARANCE: Patient appears comfortable and in no acute distress, patient is clean and well groomed.  SKIN: The skin is warm and dry, color consistent with ethnicity, patient has normal skin turgor and moist mucus membranes, skin intact, no breakdown or bruising noted.   MUSCULOSKELETAL: Patient moving all extremities spontaneously. Swelling noted to (R) middle digit with pain upon movement.    RESPIRATORY: Airway is open and patent, respirations are spontaneous, patient has a normal effort and rate, no accessory muscle use noted.  CARDIAC: Patient has a normal rate and regular rhythm, no edema noted, capillary refill < 3 seconds.   GASTRO: Soft and non tender to palpation, no distention noted.   : Pt denies any pain or frequency with urination.  NEURO: Pt opens eyes spontaneously, behavior appropriate to situation, follows commands, facial expression symmetrical, bilateral hand grasp equal and even, purposeful motor response noted, normal sensation in all extremities when touched with a finger.

## 2019-11-14 ENCOUNTER — HOSPITAL ENCOUNTER (EMERGENCY)
Facility: HOSPITAL | Age: 44
Discharge: HOME | End: 2019-11-14
Attending: EMERGENCY MEDICINE
Payer: MEDICAID

## 2019-11-14 VITALS
TEMPERATURE: 98 F | HEART RATE: 92 BPM | HEIGHT: 67 IN | DIASTOLIC BLOOD PRESSURE: 89 MMHG | OXYGEN SATURATION: 99 % | BODY MASS INDEX: 27.15 KG/M2 | SYSTOLIC BLOOD PRESSURE: 140 MMHG | RESPIRATION RATE: 18 BRPM | WEIGHT: 173 LBS

## 2019-11-14 DIAGNOSIS — L03.119 CELLULITIS OF LOWER EXTREMITY, UNSPECIFIED LATERALITY: Primary | ICD-10-CM

## 2019-11-14 DIAGNOSIS — I87.2 VENOUS INSUFFICIENCY: ICD-10-CM

## 2019-11-14 DIAGNOSIS — M79.89 SWELLING OF LOWER EXTREMITY: ICD-10-CM

## 2019-11-14 PROCEDURE — 99284 EMERGENCY DEPT VISIT MOD MDM: CPT | Mod: ,,, | Performed by: PHYSICIAN ASSISTANT

## 2019-11-14 PROCEDURE — 99284 PR EMERGENCY DEPT VISIT,LEVEL IV: ICD-10-PCS | Mod: ,,, | Performed by: PHYSICIAN ASSISTANT

## 2019-11-14 PROCEDURE — 25000003 PHARM REV CODE 250: Performed by: PHYSICIAN ASSISTANT

## 2019-11-14 PROCEDURE — 99283 EMERGENCY DEPT VISIT LOW MDM: CPT

## 2019-11-14 RX ORDER — SULFAMETHOXAZOLE AND TRIMETHOPRIM 800; 160 MG/1; MG/1
1 TABLET ORAL 2 TIMES DAILY
Qty: 13 TABLET | Refills: 0 | Status: SHIPPED | OUTPATIENT
Start: 2019-11-14 | End: 2019-11-21

## 2019-11-14 RX ORDER — SULFAMETHOXAZOLE AND TRIMETHOPRIM 800; 160 MG/1; MG/1
1 TABLET ORAL
Status: COMPLETED | OUTPATIENT
Start: 2019-11-14 | End: 2019-11-14

## 2019-11-14 RX ADMIN — SULFAMETHOXAZOLE AND TRIMETHOPRIM 1 TABLET: 800; 160 TABLET ORAL at 09:11

## 2019-11-14 NOTE — ED TRIAGE NOTES
Pt c/o bilateral leg swelling & oozing.  Right leg is having pain.  Pt states pain & oozing began approx 2 days ago.  Denies fever.

## 2019-11-14 NOTE — ED PROVIDER NOTES
Encounter Date: 11/14/2019       History     Chief Complaint   Patient presents with    Leg Pain     leg pain and swelling i think i got cellulitis     44 year old male with medical history of HTN, CKD, Venous insufficiency, Gout presenting to the ED with the chief complaint of bilateral leg swelling and rash. Patient reports acute on chronic lower extremity swelling for the past week. He has noticed overlying ulcerations develop around his shins that he has been scratching. He noticed oozing from the ulcerations today which prompted his ED visit today. He has not been wearing his compression stockings as previously directed. He was successfully treated for BLE cellulitis 3 months ago with Bactrim. He denies history of DVT and states he has been worked up several times for DVTs in the past. No recent surgeries or travel. No falls or trauma.         Review of patient's allergies indicates:   Allergen Reactions    Nsaids (non-steroidal anti-inflammatory drug) Other (See Comments)     Bleeding       No past medical history on file.  No past surgical history on file.  No family history on file.  Social History     Tobacco Use    Smoking status: Not on file   Substance Use Topics    Alcohol use: Not on file    Drug use: Not on file     Review of Systems   Constitutional: Negative for chills, diaphoresis and fever.   HENT: Negative for congestion, sore throat and trouble swallowing.    Eyes: Negative for pain, redness and visual disturbance.   Respiratory: Negative for cough and shortness of breath.    Cardiovascular: Positive for leg swelling. Negative for chest pain.   Gastrointestinal: Negative for abdominal pain, diarrhea, nausea and vomiting.   Genitourinary: Negative for dysuria.   Musculoskeletal: Negative for back pain, neck pain and neck stiffness.   Skin: Positive for rash.   Neurological: Negative for weakness, light-headedness and headaches.   Hematological: Does not bruise/bleed easily.       Physical  Exam     Initial Vitals [11/14/19 0828]   BP Pulse Resp Temp SpO2   (!) 140/89 92 18 98.3 °F (36.8 °C) 99 %      MAP       --         Physical Exam    Constitutional: He appears well-developed and well-nourished. He is not diaphoretic. No distress.   HENT:   Head: Normocephalic and atraumatic.   Mouth/Throat: Oropharynx is clear and moist. No oropharyngeal exudate.   Eyes: EOM are normal. Pupils are equal, round, and reactive to light.   Neck: Normal range of motion. Neck supple.   Cardiovascular: Normal rate, regular rhythm and intact distal pulses.   +2 DP pulses bilaterally   Pulmonary/Chest: Breath sounds normal. No respiratory distress. He has no wheezes. He has no rales.   Speaking full sentences without difficulty   Abdominal: Soft. There is no tenderness.   Musculoskeletal: Normal range of motion. He exhibits no tenderness.   Full active and passive ROM of extremities   Neurological: He is alert and oriented to person, place, and time. He has normal strength. No cranial nerve deficit or sensory deficit.   Ambulates without assistance in the ED   Skin: Skin is warm and dry. Rash noted. There is erythema.   Erythematous patches to bilateral shins with overlying superficial ulcerations with expressible clear fluid. No induration or fluctuance.     Lower extremities:        ED Course   Procedures  Labs Reviewed - No data to display       Imaging Results    None          Medical Decision Making:   History:   Old Medical Records: I decided to obtain old medical records.  Old Records Summarized: records from clinic visits.       APC / Resident Notes:   44 year old male with medical history of HTN, CKD, Venous insufficiency, Gout presenting to the ED c/o bilateral leg swelling and rash. Patient seen in the ED with similar complaint 3 months ago and successfully treated with Bactrim for cellulitis. Reports being worked-up previously for DVT multiple times with negative results. DDx includes but not limited to  cellulitis, venous insufficiency, erysipelas, CHF.    Etiology most consistent with venous insufficiency with overlying cellulitis. Leg swelling symmetric bilaterally. Do not suspect DVT at this time as he has had negative U/S for similar symptoms in the past. Vitals stable. Lungs are clear and patient speaking full sentences without difficulty. Do not suspect CHF exacerbation. Patient stable for outpatient management. RX for Bactrim provided with first dose given in the ED. Instructed patient to wear his prescribed compression stockings and to keep his legs elevated while at rest. Patient expresses understanding and agreeable to the plan. Return to ED precautions given for new, worsening, or concerning symptoms. I have discussed the care of this patient with my supervising physician.                               Clinical Impression:       ICD-10-CM ICD-9-CM   1. Cellulitis of lower extremity, unspecified laterality L03.119 682.6   2. Venous insufficiency I87.2 459.81   3. Swelling of lower extremity M79.89 729.81         Disposition:   Disposition: Discharged  Condition: Stable                     Stu Suarez PA-C  11/14/19 0952

## 2019-11-14 NOTE — ED NOTES
Appearance:  Pt awake, alert & oriented to person, place & time.  Pt in no acute distress at present time.  Skin:  Skin warm, dry & intact.  Mucous membranes moist.  Skin turgor normal.  Respiratory:  Respirations even, non-labored.    Neurologic:  Pt moving all extremities without difficulty.  Sensation intact.     Peripheral Vascular:  All peripheral pulses present.  BLE edema with scattered sores and redness.

## 2019-11-14 NOTE — DISCHARGE INSTRUCTIONS
Take the prescribed Bactrim as directed for treatment of the underlying infection  Avoid itching the area as this may worsen the rash  Keep your legs elevated above your heart level when at rest. Use your compression stockings to additionally help with the lower extremity swelling

## 2020-09-22 ENCOUNTER — HOSPITAL ENCOUNTER (EMERGENCY)
Facility: OTHER | Age: 45
Discharge: HOME | End: 2020-09-22
Attending: EMERGENCY MEDICINE
Payer: MEDICAID

## 2020-09-22 VITALS
TEMPERATURE: 99 F | RESPIRATION RATE: 16 BRPM | OXYGEN SATURATION: 100 % | HEIGHT: 68 IN | HEART RATE: 77 BPM | DIASTOLIC BLOOD PRESSURE: 102 MMHG | BODY MASS INDEX: 28.04 KG/M2 | SYSTOLIC BLOOD PRESSURE: 155 MMHG | WEIGHT: 185 LBS

## 2020-09-22 DIAGNOSIS — M79.89 SWELLING OF RIGHT MIDDLE FINGER: ICD-10-CM

## 2020-09-22 DIAGNOSIS — L03.012 PARONYCHIA OF LEFT INDEX FINGER: Primary | ICD-10-CM

## 2020-09-22 PROCEDURE — 99283 EMERGENCY DEPT VISIT LOW MDM: CPT | Mod: 25

## 2020-09-22 PROCEDURE — 25000003 PHARM REV CODE 250: Performed by: EMERGENCY MEDICINE

## 2020-09-22 RX ORDER — HYDROCODONE BITARTRATE AND ACETAMINOPHEN 5; 325 MG/1; MG/1
1 TABLET ORAL
Status: COMPLETED | OUTPATIENT
Start: 2020-09-22 | End: 2020-09-22

## 2020-09-22 RX ORDER — MUPIROCIN 20 MG/G
OINTMENT TOPICAL 3 TIMES DAILY
Qty: 15 G | Refills: 0 | Status: SHIPPED | OUTPATIENT
Start: 2020-09-22

## 2020-09-22 RX ADMIN — HYDROCODONE BITARTRATE AND ACETAMINOPHEN 1 TABLET: 5; 325 TABLET ORAL at 09:09

## 2020-09-23 NOTE — ED PROVIDER NOTES
Encounter Date: 9/22/2020    SCRIBE #1 NOTE: I, Ovidio Mendoza, am scribing for, and in the presence of, Dr. Rae.       History     Chief Complaint   Patient presents with    Hand Pain     pt reports finger pain to right 2nd digit and left 3rd digit, repotrs taking antibiotic for infection at tip pf finger, no relief of symptoms     Time seen by provider: 9:35 PM    This is a 45 y.o. male with a history of gout and osteomyelitis who presents with complaint of pain on both hands located to his left index finger and right middle finger. Last week, he got an infection on his left index finger, and was prescribed an antibiotic for the it. The infection got better while on the antibiotic, but after finishing the medication, he reports it is beginning to get worse again. On the right middle finger, patient has had pain for the past 4-5 days. He says that the pain makes his finger feel tight and hard for him to bend. He denies any injury to this hand. Patient rates total pain as an 8/10, and has been doing warm soaks and applying antibiotic cream where necessary. No fever. Patient also denies having a cough, chest pain, shortness of breath, and diarrhea. Patient has an allergy to NSAIDs, and his only past surgeries have been on his hands.       The history is provided by the patient.     Review of patient's allergies indicates:   Allergen Reactions    Nsaids (non-steroidal anti-inflammatory drug)      Hx of GI bleed     Past Medical History:   Diagnosis Date    Arthritis 10/11/2013    Blood transfusion     Cellulitis     GERD (gastroesophageal reflux disease)     GI bleed     Gout attack     Hypertension     Neuropathy     Osteomyelitis     Renal disorder     CKD stage 3     Past Surgical History:   Procedure Laterality Date    CYST REMOVAL      INCISION AND DRAINAGE OF WOUND      left hand third finger    SURERY ON LEFT MIDDLE FINGER      OSTEOMYLITIS     Family History   Problem Relation Age of Onset     Heart disease Mother     Diabetes Mother     Kidney disease Mother     Hypertension Mother     Stroke Mother     Cancer Mother      Social History     Tobacco Use    Smoking status: Never Smoker    Smokeless tobacco: Never Used   Substance Use Topics    Alcohol use: No    Drug use: No     Review of Systems   Constitutional: Negative for chills and fever.   HENT: Negative for sore throat.    Respiratory: Negative for shortness of breath.    Cardiovascular: Negative for chest pain.   Gastrointestinal: Negative for diarrhea, nausea and vomiting.   Genitourinary: Negative for dysuria.   Musculoskeletal: Positive for arthralgias (hands). Negative for back pain.   Skin: Negative for rash.   Neurological: Negative for weakness.   Hematological: Does not bruise/bleed easily.       Physical Exam     Initial Vitals [09/22/20 2031]   BP Pulse Resp Temp SpO2   (!) 163/109 87 18 99.5 °F (37.5 °C) 100 %      MAP       --         Physical Exam    Nursing note and vitals reviewed.  Constitutional: He appears well-developed and well-nourished. He is not diaphoretic. No distress.   HENT:   Head: Normocephalic and atraumatic.   Eyes: Conjunctivae and EOM are normal. Pupils are equal, round, and reactive to light.   Neck: Normal range of motion. No tracheal deviation present. No JVD present.   Cardiovascular: Normal rate, regular rhythm, normal heart sounds and intact distal pulses. Exam reveals no gallop and no friction rub.    No murmur heard.  Pulmonary/Chest: Breath sounds normal. No respiratory distress. He has no wheezes. He has no rhonchi. He has no rales. He exhibits no tenderness.   Abdominal: Soft. Bowel sounds are normal. He exhibits no distension and no mass. There is no abdominal tenderness. There is no rebound and no guarding.   Musculoskeletal: Normal range of motion. No edema.      Comments: Mild swelling of DIP joint on right third digit. There is mild diffuse swelling of this finger without erythema or  discrete lesion.  Minimal swelling and erythema of distal medial nail fold left second digit.      Neurological: He is alert and oriented to person, place, and time. He has normal strength and normal reflexes. He displays normal reflexes. No cranial nerve deficit or sensory deficit.   Skin: Skin is warm and dry. Capillary refill takes less than 2 seconds. No rash noted. No erythema.   Psychiatric: He has a normal mood and affect. Thought content normal.         ED Course   Procedures  Labs Reviewed - No data to display       Imaging Results    None       X-Rays:   Independently Interpreted Readings:   Other Readings:  Right middle finger x-ray:  No fracture, no dislocation, no foreign body.  Will defer to Radiology reading.    Medical Decision Making:   History:   Old Medical Records: I decided to obtain old medical records.  Independently Interpreted Test(s):   I have ordered and independently interpreted X-rays - see prior notes.  Clinical Tests:   Radiological Study: Ordered and Reviewed  ED Management:  Urgent evaluation a 45-year-old male with history of gout who presents with complaint of finger pain.  He reports nontraumatic left index finger pain which improved with antibiotics and is now worsening and nontraumatic right middle finger pain.  On examination there is a minimal paronychia present.  He is encouraged to continue warm soaks and is prescribed mupirocin topical ointment.  He was offered incision D but declines, and I do not suspect there would be significant drainage even if this were performed.  Right middle finger is moderately swollen but no erythema and full range of motion normal capillary refill.  X-ray shows no underlying fracture.  He is encouraged analgesics and rest, close follow-up with his PCP or to return for worsening.            Scribe Attestation:   Scribe #1: I performed the above scribed service and the documentation accurately describes the services I performed. I attest to the  accuracy of the note.    Attending Attestation:           Physician Attestation for Scribe:  Physician Attestation Statement for Scribe #1: I, Dr. Rae, reviewed documentation, as scribed by Ovidio Mendoza in my presence, and it is both accurate and complete.                           Clinical Impression:      No diagnosis found.                             Eulalia Rae MD  09/26/20 7333

## 2020-09-23 NOTE — DISCHARGE INSTRUCTIONS
Read attached handouts.  Make sure you soak your left index finger at least 3 times a day in warm water and apply antibiotic ointment which was prescribed.  Return to the ER for a drainage procedure if it worsens.  Use over-the-counter Tylenol as needed for pain.

## 2020-09-23 NOTE — ED NOTES
Patient presents to the ED with c/o left index finger pain and right middle finger pain.  Patient states that they were on Clindamycin for an infection to left index finger and finished medication about 6 days ago and believes it is becoming worse.  Patient states that they have a hx of gout to right middle finger and is c/o swelling/pain.

## 2021-03-29 ENCOUNTER — IMMUNIZATION (OUTPATIENT)
Dept: PRIMARY CARE CLINIC | Facility: CLINIC | Age: 46
End: 2021-03-29
Payer: MEDICAID

## 2021-03-29 DIAGNOSIS — Z23 NEED FOR VACCINATION: Primary | ICD-10-CM

## 2021-03-29 PROCEDURE — 91301 PR SARS-COV-2 COVID-19 VACCINE, NO PRSV, 100MCG/0.5ML, IM: ICD-10-PCS | Mod: S$GLB,,, | Performed by: INTERNAL MEDICINE

## 2021-03-29 PROCEDURE — 91301 PR SARS-COV-2 COVID-19 VACCINE, NO PRSV, 100MCG/0.5ML, IM: CPT | Mod: S$GLB,,, | Performed by: INTERNAL MEDICINE

## 2021-03-29 PROCEDURE — 0011A PR IMMUNIZ ADMIN, SARS-COV-2 COVID-19 VACC, 100MCG/0.5ML, 1ST DOSE: ICD-10-PCS | Mod: CV19,S$GLB,, | Performed by: INTERNAL MEDICINE

## 2021-03-29 PROCEDURE — 0011A PR IMMUNIZ ADMIN, SARS-COV-2 COVID-19 VACC, 100MCG/0.5ML, 1ST DOSE: CPT | Mod: CV19,S$GLB,, | Performed by: INTERNAL MEDICINE

## 2021-03-29 RX ADMIN — Medication 0.5 ML: at 09:03

## 2021-04-28 ENCOUNTER — IMMUNIZATION (OUTPATIENT)
Dept: PRIMARY CARE CLINIC | Facility: CLINIC | Age: 46
End: 2021-04-28
Payer: MEDICAID

## 2021-04-28 DIAGNOSIS — Z23 NEED FOR VACCINATION: Primary | ICD-10-CM

## 2021-04-28 PROCEDURE — 0012A PR IMMUNIZ ADMIN, SARS-COV-2 COVID-19 VACC, 100MCG/0.5ML, 2ND DOSE: CPT | Mod: CV19,S$GLB,, | Performed by: INTERNAL MEDICINE

## 2021-04-28 PROCEDURE — 91301 PR SARS-COV-2 COVID-19 VACCINE, NO PRSV, 100MCG/0.5ML, IM: ICD-10-PCS | Mod: S$GLB,,, | Performed by: INTERNAL MEDICINE

## 2021-04-28 PROCEDURE — 0012A PR IMMUNIZ ADMIN, SARS-COV-2 COVID-19 VACC, 100MCG/0.5ML, 2ND DOSE: ICD-10-PCS | Mod: CV19,S$GLB,, | Performed by: INTERNAL MEDICINE

## 2021-04-28 PROCEDURE — 91301 PR SARS-COV-2 COVID-19 VACCINE, NO PRSV, 100MCG/0.5ML, IM: CPT | Mod: S$GLB,,, | Performed by: INTERNAL MEDICINE

## 2021-04-28 RX ADMIN — Medication 0.5 ML: at 09:04

## 2022-07-12 ENCOUNTER — HOSPITAL ENCOUNTER (EMERGENCY)
Facility: OTHER | Age: 47
Discharge: HOME | End: 2022-07-12
Attending: EMERGENCY MEDICINE
Payer: MEDICAID

## 2022-07-12 VITALS
DIASTOLIC BLOOD PRESSURE: 99 MMHG | HEIGHT: 67 IN | OXYGEN SATURATION: 100 % | HEART RATE: 86 BPM | RESPIRATION RATE: 18 BRPM | TEMPERATURE: 98 F | BODY MASS INDEX: 27.94 KG/M2 | SYSTOLIC BLOOD PRESSURE: 157 MMHG | WEIGHT: 178 LBS

## 2022-07-12 DIAGNOSIS — S62.306A CLOSED DISPLACED FRACTURE OF FIFTH METACARPAL BONE OF RIGHT HAND, UNSPECIFIED PORTION OF METACARPAL, INITIAL ENCOUNTER: Primary | ICD-10-CM

## 2022-07-12 PROCEDURE — 25000003 PHARM REV CODE 250: Performed by: EMERGENCY MEDICINE

## 2022-07-12 PROCEDURE — 29125 APPL SHORT ARM SPLINT STATIC: CPT | Mod: RT

## 2022-07-12 PROCEDURE — 99283 EMERGENCY DEPT VISIT LOW MDM: CPT | Mod: 25

## 2022-07-12 RX ORDER — HYDROCODONE BITARTRATE AND ACETAMINOPHEN 5; 325 MG/1; MG/1
2 TABLET ORAL
Status: COMPLETED | OUTPATIENT
Start: 2022-07-12 | End: 2022-07-12

## 2022-07-12 RX ADMIN — HYDROCODONE BITARTRATE AND ACETAMINOPHEN 2 TABLET: 5; 325 TABLET ORAL at 07:07

## 2022-07-12 NOTE — FIRST PROVIDER EVALUATION
Emergency Department TeleTriage Encounter Note      CHIEF COMPLAINT    Chief Complaint   Patient presents with    hand pain     Pt c.o right hand pain and swelling onset yesterday.  Pt denies injury.  AAO x 3 nadn skin w.d  pt denies injury.  +mild to mod swelling to top of right hand.  No obvious deformity noted.        VITAL SIGNS   Initial Vitals [07/12/22 1728]   BP Pulse Resp Temp SpO2   (!) 161/107 107 18 98.1 °F (36.7 °C) 99 %      MAP       --            ALLERGIES    Review of patient's allergies indicates:   Allergen Reactions    Nsaids (non-steroidal anti-inflammatory drug)      Hx of GI bleed    Nsaids (non-steroidal anti-inflammatory drug) Other (See Comments)     Bleeding         PROVIDER TRIAGE NOTE  This is a teletriage evaluation of a 47 y.o. male presenting to the ED complaining of right hand pain and swelling starting yesterday. Pt reports that he accidentally hit his hand on a pot on Saturday. No other trauma.     Well-appearing, moderate right hand swelling.     Initial orders will be placed and care will be transferred to an alternate provider when patient is roomed for a full evaluation. Any additional orders and the final disposition will be determined by that provider.           ORDERS  Labs Reviewed - No data to display    ED Orders (720h ago, onward)    Start Ordered     Status Ordering Provider    07/12/22 1734 07/12/22 1733  X-Ray Hand 3 View Right  1 time imaging         Ordered DONN SOL            Virtual Visit Note: The provider triage portion of this emergency department evaluation and documentation was performed via [employer], a HIPAA-compliant telemedicine application, in concert with a tele-presenter in the room. A face to face patient evaluation with one of my colleagues will occur once the patient is placed in an emergency department room.      DISCLAIMER: This note was prepared with [facility] voice recognition transcription software. Garbled syntax, mangled  pronouns, and other bizarre constructions may be attributed to that software system.

## 2022-07-13 NOTE — ED PROVIDER NOTES
"Encounter Date: 7/12/2022    SCRIBE #1 NOTE: I, Tamiko Shorty, am scribing for, and in the presence of, Toan Willson II, MD.       History     Chief Complaint   Patient presents with    hand pain     Pt c.o right hand pain and swelling onset yesterday.  Pt denies injury.  AAO x 3 nadn skin w.d  pt denies injury.  +mild to mod swelling to top of right hand.  No obvious deformity noted.      This is a 47 y.o. male who presents with complaint of right hand pain x 3 days. Pt states that he hit his hand between a metal pot and the sink 3 days ago while washing dishes. He reports an associated symptom of swelling that began yesterday morning. Pt visited [facility] ED yesterday for similar complaints. He was discharged with a prescription for Tramadol, as well as three other medications. Pt states that he stopped taking the Tramadol due to it making him "jumpy." He denies feeling any relief from these medications. Pt reports a similar occurrence of pain and swelling two years ago in his left hand. He has an allergy to NSAIDs. Pt adds that his PCP is Dr. Uribe.     The history is provided by the patient.     Review of patient's allergies indicates:   Allergen Reactions    Nsaids (non-steroidal anti-inflammatory drug)      Hx of GI bleed    Nsaids (non-steroidal anti-inflammatory drug) Other (See Comments)     Bleeding       Past Medical History:   Diagnosis Date    Arthritis 10/11/2013    Blood transfusion     Cellulitis     GERD (gastroesophageal reflux disease)     GI bleed     Gout attack     Hypertension     Neuropathy     Osteomyelitis     Renal disorder     CKD stage 3     Past Surgical History:   Procedure Laterality Date    CYST REMOVAL      INCISION AND DRAINAGE OF WOUND      left hand third finger    SURERY ON LEFT MIDDLE FINGER      OSTEOMYLITIS     Family History   Problem Relation Age of Onset    Heart disease Mother     Diabetes Mother     Kidney disease Mother     Hypertension " Mother     Stroke Mother     Cancer Mother      Social History     Tobacco Use    Smoking status: Never Smoker    Smokeless tobacco: Never Used   Substance Use Topics    Alcohol use: No    Drug use: No     Review of Systems   Constitutional: Negative for fever.   HENT: Negative for sore throat.    Respiratory: Negative for shortness of breath.    Cardiovascular: Negative for chest pain.   Gastrointestinal: Negative for nausea.   Genitourinary: Negative for dysuria.   Musculoskeletal: Positive for myalgias (right hand). Negative for back pain.        Positive for right hand swelling.   Skin: Negative for rash.   Neurological: Negative for weakness.   Hematological: Does not bruise/bleed easily.       Physical Exam     Initial Vitals [07/12/22 1728]   BP Pulse Resp Temp SpO2   (!) 161/107 107 18 98.1 °F (36.7 °C) 99 %      MAP       --         Physical Exam    Constitutional: He appears well-developed and well-nourished.   HENT:   Head: Atraumatic.   Eyes: EOM are normal.   Neck:   Normal range of motion.  Musculoskeletal:      Cervical back: Normal range of motion.      Comments: Kyphosis of upper thoracic region. Right hand dominant. Soft tissue swelling and pain on medial half of hand. Pain with, but able to perform full ROM of wrist and fingers. No rotational deformity. NVID. No erythema or warmth.      Neurological: He is alert and oriented to person, place, and time.   Skin: Skin is dry.   Psychiatric: He has a normal mood and affect.         ED Course   Procedures  Labs Reviewed - No data to display       Imaging Results          X-Ray Hand 3 View Right (Final result)  Result time 07/12/22 17:59:18    Final result by Fabi Anthony MD (07/12/22 17:59:18)                 Impression:      Acute 5th metacarpal fracture.      Electronically signed by: Fabi Anthony MD  Date:    07/12/2022  Time:    17:59             Narrative:    EXAMINATION:  XR HAND COMPLETE 3 VIEW RIGHT    CLINICAL HISTORY:  hand  swelling;    TECHNIQUE:  PA, lateral, and oblique views of the right hand were performed.    COMPARISON:  August 2019.    FINDINGS:  Acute mild displaced fracture is seen at the distal aspect of the 5th metatarsal shaft.  There is palmar angulation seen of the distal fracture component.  No additional acute displaced fractures or dislocation seen.  Monitoring device is seen over the distal aspect of the index finger which partially obscures evaluation.  No radiopaque retained foreign body seen.                              X-Rays:   Independently Interpreted Readings:   Other Readings:  X-Ray Hand 3 View Right:    Volarly angled distal 5th metacarpal fracture.    Medications   HYDROcodone-acetaminophen 5-325 mg per tablet 2 tablet (2 tablets Oral Given 7/12/22 1926)     Medical Decision Making:   History:   Old Medical Records: I decided to obtain old medical records.  Independently Interpreted Test(s):   I have ordered and independently interpreted X-rays - see prior notes.  Clinical Tests:   Radiological Study: Ordered and Reviewed  Patient presents complaining of right hand pain and swelling for the past few days.  He reports that he banged it hard against a metal pot before onset of this but also that he has had history of gout with similar symptoms.  Initially denied taking any medications for it and reports previous episode was 2 years ago.  However chart review showed a visit for the same symptoms to [facility] yesterday, with multiple prescriptions provided.  Upon direct questioning the patient now recalls this and reports he started taking the prednisone and antibiotics but not the tramadol because it makes him jumpy.  On exam he does have soft tissue swelling tenderness of radial aspect of hand, but no erythema or warmth to suggest infectious process although he has already been initiated on antibiotics.  X-ray was obtained from triage and shows a anteriorly angulated boxer's fracture.  Patient will  be placed in ulnar gutter, with sling.  Encouraged follow-up with orthopedics.  Also recommended follow-up with primary care given history of gout        Scribe Attestation:   Scribe #1: I performed the above scribed service and the documentation accurately describes the services I performed. I attest to the accuracy of the note.                   Physician Attestation for Scribe: I, TLH, reviewed documentation as scribed in my presence, which is both accurate and complete.      Clinical Impression:   Final diagnoses:  [S62.306A] Closed displaced fracture of fifth metacarpal bone of right hand, unspecified portion of metacarpal, initial encounter (Primary)          ED Disposition Condition    Discharge Stable        ED Prescriptions     None        Follow-up Information     Follow up With Specialties Details Why Contact Info    DENIA Uribe MD Family Medicine In 5 days  [address]  [place] [address]  [phone]      [facility] Orthopedic Surgery Schedule an appointment as soon as possible for a visit   [address] [facility] [address]  [phone]      [facility] Surgical Oncology, Orthopedic Surgery, Genetics, Physical Medicine and Rehabilitation, Occupational Therapy, Radiology Schedule an appointment as soon as possible for a visit  to see orthopedics [address] [facility] [address]  [phone]             Toan Willson II, MD  07/12/22 1954

## 2024-08-14 NOTE — ED PROVIDER NOTES
Encounter Date: 2/2/2018    SCRIBE #1 NOTE: I, Ana Rosa Rodriguez, am scribing for, and in the presence of,  Dr. Murcia. I have scribed the entire note.       History     Chief Complaint   Patient presents with    Finger Pain     The patient is a 42 y.o. male with history of gout attack, osteomyelitis and cellulitis who presents to the ED with a complaint of finger pain to the right hand. Patient reports he was evaluated a week ago in the ED and was given steroids and NORCO with no relief. He endorses the pain and swelling has worsened. Patient notes he is on 100 mg of PO allopurinol 4 times daily. Patient has no additional complaints at this time.      The history is provided by the patient and medical records.     Review of patient's allergies indicates:   Allergen Reactions    Nsaids (non-steroidal anti-inflammatory drug)      Hx of GI bleed     Past Medical History:   Diagnosis Date    Arthritis 10/11/2013    Blood transfusion     Cellulitis     GERD (gastroesophageal reflux disease)     GI bleed     Gout attack     Hypertension     Neuropathy     Osteomyelitis      Past Surgical History:   Procedure Laterality Date    CYST REMOVAL      INCISION AND DRAINAGE OF WOUND      left hand third finger    SURERY ON LEFT MIDDLE FINGER      OSTEOMYLITIS     Family History   Problem Relation Age of Onset    Heart disease Mother     Diabetes Mother     Kidney disease Mother     Hypertension Mother     Stroke Mother     Cancer Mother      Social History   Substance Use Topics    Smoking status: Never Smoker    Smokeless tobacco: Never Used    Alcohol use No     Review of Systems   Constitutional: Negative for fever.   HENT: Negative for sore throat.    Respiratory: Negative for shortness of breath.    Cardiovascular: Negative for chest pain.   Gastrointestinal: Negative for nausea.   Genitourinary: Negative for dysuria.   Musculoskeletal: Negative for back pain.        Negative for pain and swelling to  [FreeTextEntry1] : 24yo G0, LMP 4/5/24 presenting for clinic for follow up for endometriosis. Patient started on continuous Sprintec OCPs  in May 2024 with good results, currently asymptomatic except for occasional spotting, happy with current regimen.   #endometriosis - C/w continuous Sprintec OCP, discussed recommendation for withdrawal bleeding 3-4x per year to prevent more consistent spotting. Pt counseled to skip placebo pills each month except for every 4 months, she will take the placebo pills for 1 week and then resume next pill pack. Discussed pain control with standing Motrin/Tylenol q6 hours alternating during that week in anticipation of pelvic pain with withdrawal bleed. - Pt expressed understanding, all questions answered.    Return to clinic in 3 months for annual and symptom follow up (discussed need for pap smear - patient defers exam today and would prefer to follow up for annual visit).   D/w Dr Lawrence PGY6 S [facility] PGY4  Fellow Addendum: I agree with the above resident's note, 24yo G0 presenting for endometriosis follow up visit. Currently doing well on continuous cOCPs with occasional spotting. Pt to continue this regimen at this time. Discussed withdrawal bleed q4 months. Return for annual visit.  Shiloh Lawrence MD [facility], PGY-6 finger to the right hand.   Skin: Negative for rash.   Neurological: Negative for weakness.   Hematological: Does not bruise/bleed easily.       Physical Exam     Initial Vitals [02/02/18 1428]   BP Pulse Resp Temp SpO2   (!) 169/80 90 18 99.2 °F (37.3 °C) 100 %      MAP       109.67         Physical Exam    Vitals reviewed.  Constitutional: He appears well-developed and well-nourished. No distress.   HENT:   Head: Normocephalic and atraumatic.   Eyes: Conjunctivae are normal.   Neck: Normal range of motion.   Cardiovascular: Normal rate.   Pulmonary/Chest: No respiratory distress.   Musculoskeletal:   Right hand long finger with minimal swelling and tenderness, has full passive ROM   Neurological: He is alert and oriented to person, place, and time.                   ED Course   Procedures  Labs Reviewed - No data to display          Medical Decision Making:   History:   Old Medical Records: I decided to obtain old medical records.  Other:   I have discussed this case with another health care provider.            Scribe Attestation:   Scribe #1: I performed the above scribed service and the documentation accurately describes the services I performed. I attest to the accuracy of the note.    Attending Attestation:             Attending ED Notes:   Evaluation of persistent right finger pain. Previously diagnosed with gout. Finished steroid course. Discussed with ortho, who was familiar with case. Given his exam today, I have low suspicion for infectious etiology. Prior operative washout have all been gout as well. Will continue steroids and colchicine. Patient has rheum appointment in two week.           ED Course      Clinical Impression:   The primary encounter diagnosis was Gout, unspecified cause, unspecified chronicity, unspecified site. A diagnosis of Pain of finger of right hand was also pertinent to this visit.    Disposition:   Disposition: Discharged  Condition: Stable                        Rossi Egan  MD [place]  02/02/18 0613